# Patient Record
Sex: MALE | Race: WHITE | NOT HISPANIC OR LATINO | Employment: FULL TIME | URBAN - METROPOLITAN AREA
[De-identification: names, ages, dates, MRNs, and addresses within clinical notes are randomized per-mention and may not be internally consistent; named-entity substitution may affect disease eponyms.]

---

## 2020-09-16 ENCOUNTER — OFFICE VISIT (OUTPATIENT)
Dept: GASTROENTEROLOGY | Facility: CLINIC | Age: 40
End: 2020-09-16
Payer: COMMERCIAL

## 2020-09-16 VITALS
SYSTOLIC BLOOD PRESSURE: 145 MMHG | TEMPERATURE: 99.9 F | HEART RATE: 75 BPM | BODY MASS INDEX: 24.77 KG/M2 | DIASTOLIC BLOOD PRESSURE: 107 MMHG | WEIGHT: 163.4 LBS | HEIGHT: 68 IN

## 2020-09-16 DIAGNOSIS — K92.1 HEMATOCHEZIA: Primary | ICD-10-CM

## 2020-09-16 DIAGNOSIS — Z00.00 PREVENTATIVE HEALTH CARE: ICD-10-CM

## 2020-09-16 PROCEDURE — 99204 OFFICE O/P NEW MOD 45 MIN: CPT | Performed by: INTERNAL MEDICINE

## 2020-09-16 RX ORDER — MULTIVITAMIN
1 TABLET ORAL DAILY
COMMUNITY

## 2020-09-16 NOTE — PATIENT INSTRUCTIONS
Colonoscopy scheduled for 10/16/20 with Dr Vance [facility]  Supr instructions given by Kimberley CALDERON in the office

## 2020-09-16 NOTE — PROGRESS NOTES
[facility] - Outpatient Consultation  Roseanne Moreau 36 y o  male MRN: [id number]  Encounter: [id number]          ASSESSMENT AND PLAN:      51-year-old presents for evaluation of hematochezia  1  Hematochezia  Chronic symptoms  Most likely outlet bleeding  Could be due to hemorrhoids, rectal ulcers, IBD, tumors  Will check CBC and CMP  Will get colonoscopy  Will add EGD if hemoglobin significantly decreased below normal     2  Preventative  Healthcare  Check hepatitis C antibody    Aniceto Beck MD  Gastroenterology Fellow  [facility]  Date: September 16, 2020    ______________________________________________________________________    HPI:   51-year-old presents for evaluation of hematochezia  Patient says that he has been having bright red blood with stools for the last 1 year  The color of the stool is brown  The blood comes usually on the 2nd bowel movement of the day and then he has drops of blood in his underwear afterwards as well  He denies any constipation  He has used hemorrhoidal cream but that has not helped  No melena or maroon-colored stool  No other GI symptoms  No weight loss  He is current smoker and takes few beers daily  Aunt had colon cancer  No labs or abdominal imaging in the charts to review today  REVIEW OF SYSTEMS:    CONSTITUTIONAL: Denies any fever, chills, rigors, and weight loss  HEENT: No earache or tinnitus  Denies hearing loss or visual disturbances  CARDIOVASCULAR: No chest pain or palpitations  RESPIRATORY: Denies any cough, hemoptysis, shortness of breath or dyspnea on exertion  GASTROINTESTINAL: As noted in the History of Present Illness  GENITOURINARY: No problems with urination  Denies any hematuria or dysuria  NEUROLOGIC: No dizziness or vertigo, denies headaches  MUSCULOSKELETAL: Denies any muscle or joint pain  SKIN: Denies skin rashes or itching     ENDOCRINE: Denies excessive thirst  Denies intolerance to heat or cold  PSYCHOSOCIAL: Denies depression or anxiety  Denies any recent memory loss  Historical Information   History reviewed  No pertinent past medical history  History reviewed  No pertinent surgical history  Social History   Social History     Substance and Sexual Activity   Alcohol Use Yes    Comment: OCCASIONALLY     Social History     Substance and Sexual Activity   Drug Use Never     Social History     Tobacco Use   Smoking Status Current Some Day Smoker   Smokeless Tobacco Never Used     History reviewed  No pertinent family history  Meds/Allergies       Current Outpatient Medications:     Multiple Vitamin (multivitamin) tablet    No Known Allergies        Objective     Blood pressure (!) 145/107, pulse 75, temperature 99 9 °F (37 7 °C), temperature source Tympanic, height 5' 8" (1 727 m), weight 74 1 kg (163 lb 6 4 oz)  Body mass index is 24 84 kg/m²  PHYSICAL EXAM:      General Appearance:   Alert, cooperative, no distress   HEENT:   Normocephalic, atraumatic, anicteric      Neck:  Supple, symmetrical, trachea midline   Lungs:   Clear to auscultation bilaterally; no rales, rhonchi or wheezing; respirations unlabored    Heart[de-identified]   Regular rate and rhythm; no murmur, rub, or gallop  Abdomen:   Soft, non-tender, non-distended; normal bowel sounds; no masses, no organomegaly    Genitalia:   Deferred    Rectal:   Deferred    Extremities:  No cyanosis, clubbing or edema    Pulses:  2+ and symmetric    Skin:  No jaundice, rashes, or lesions    Lymph nodes:  No palpable cervical lymphadenopathy        Lab Results:   No visits with results within 1 Day(s) from this visit  Latest known visit with results is:   No results found for any previous visit  Radiology Results:   No results found

## 2020-09-25 ENCOUNTER — OFFICE VISIT (OUTPATIENT)
Dept: UROLOGY | Facility: MEDICAL CENTER | Age: 40
End: 2020-09-25
Payer: COMMERCIAL

## 2020-09-25 VITALS
DIASTOLIC BLOOD PRESSURE: 100 MMHG | TEMPERATURE: 96.9 F | BODY MASS INDEX: 24.71 KG/M2 | SYSTOLIC BLOOD PRESSURE: 142 MMHG | HEIGHT: 68 IN | WEIGHT: 163 LBS

## 2020-09-25 DIAGNOSIS — Z30.09 STERILIZATION CONSULT: Primary | ICD-10-CM

## 2020-09-25 PROCEDURE — 99203 OFFICE O/P NEW LOW 30 MIN: CPT | Performed by: UROLOGY

## 2020-09-25 NOTE — PATIENT INSTRUCTIONS
Vasectomy   WHAT YOU NEED TO KNOW:   A vasectomy is a procedure to make you sterile  It is a permanent form of birth control  The vas deferens (sperm tubes) are cut so that the semen does not contain sperm  DISCHARGE INSTRUCTIONS:   Medicines: You may need any of the following:  · NSAIDs  help decrease swelling, pain, and fever  This medicine can be bought with or without a doctor's order  This medicine can cause stomach bleeding or kidney problems in certain people  If you take blood thinner medicine, always ask your healthcare provider if NSAIDs are safe for you  Always read the medicine label and follow the directions on it before using this medicine  · Take your medicine as directed  Contact your healthcare provider if you think your medicine is not helping or if you have side effects  Tell him if you are allergic to any medicine  Keep a list of the medicines, vitamins, and herbs you take  Include the amounts, and when and why you take them  Bring the list or the pill bottles to follow-up visits  Carry your medicine list with you in case of an emergency  Decrease pain and swelling:   · Lie on your back  as much as possible the day of your procedure  Place a cushion such as a washcloth or small towel under your scrotum to elevate it  · Apply ice  on your scrotum for 15 to 20 minutes every hour for 2 days  Use an ice pack, or put crushed ice in a plastic bag  Cover it with a towel  Ice helps prevent tissue damage and decreases swelling and pain  · Wear an athletic supporter for at least 2 days  This will decrease pain and swelling, and protect your wound  Wound care:  Care for your wound as directed  Carefully wash the wound with soap and water  Dry the area and put on new, clean bandages as directed  Change your bandages when they get wet or dirty  Activity:  You may walk and drive normally the day after your procedure   Do not play sports, do yard work, or lift anything heavy until your healthcare provider says it is okay  You may need to wait up to a week before you have sex  Follow up with your healthcare provider or surgeon in 12 weeks: You will need to return to have your semen tested for sperm  Write down your questions so you remember to ask them during your visits  Contact your healthcare provider or surgeon if:   · You have a fever  · Your wound is red, swollen, or draining pus  · You feel pain or burning when you urinate  · You have worsening pain in your scrotum, even after you take medicine  · You have questions or concerns about your condition or care  Seek care immediately or call 911 if:   · Blood soaks through your bandage  · Your stitches come apart  · You see blood in your urine or semen  © 2017 [employer] Information is for End User's use only and may not be sold, redistributed or otherwise used for commercial purposes  All illustrations and images included in CareNotes® are the copyrighted property of A [employer] M [employer] Inc  or Eliel [employer]  The above information is an  only  It is not intended as medical advice for individual conditions or treatments  Talk to your doctor, nurse or pharmacist before following any medical regimen to see if it is safe and effective for you

## 2020-09-25 NOTE — PROGRESS NOTES
Chief complaint: Elective sterilization  History of present illness this is a 70-year-old male who presents for elective sterilization in the form of vasectomy being made aware the possibility of bleeding infection failure of vasectomy to provide sterilization recurrence sterilization or chronic pain syndrome  The procedure was explained to the patient in detail and he agreed to proceed with bilateral vasectomy  Patient has 4 children  He is also aware that this may not be reversible    Allergies medications past medical and surgical history family history reviewed  System review negative  Physical examination   well-nourished well-developed male appearing stated age in no apparent distress   HEENT normocephalic atraumatic   Pulmonary breathing unlabored   abdomen soft   phallus normal circumcised without cutaneous lesions  Meatus patent normally placed  Scrotum normal without cutaneous lesions  Testes adnexa palpably normal without masses or induration  Vas deferens palpable easily bilaterally      Extremities full range of motion without cyanosis clubbing or edema     impression   elective sterilization       plan     bilateral elective vasectomy

## 2020-10-05 ENCOUNTER — OFFICE VISIT (OUTPATIENT)
Dept: FAMILY MEDICINE CLINIC | Facility: CLINIC | Age: 40
End: 2020-10-05
Payer: COMMERCIAL

## 2020-10-05 VITALS
HEIGHT: 68 IN | SYSTOLIC BLOOD PRESSURE: 150 MMHG | DIASTOLIC BLOOD PRESSURE: 104 MMHG | OXYGEN SATURATION: 99 % | WEIGHT: 167 LBS | BODY MASS INDEX: 25.31 KG/M2 | HEART RATE: 76 BPM | TEMPERATURE: 98 F

## 2020-10-05 DIAGNOSIS — I10 UNCONTROLLED HYPERTENSION: ICD-10-CM

## 2020-10-05 DIAGNOSIS — Z13.1 SCREENING FOR DIABETES MELLITUS: ICD-10-CM

## 2020-10-05 DIAGNOSIS — Z13.29 THYROID DISORDER SCREENING: ICD-10-CM

## 2020-10-05 DIAGNOSIS — Z76.89 ENCOUNTER TO ESTABLISH CARE WITH NEW DOCTOR: Primary | ICD-10-CM

## 2020-10-05 DIAGNOSIS — Z13.220 LIPID SCREENING: ICD-10-CM

## 2020-10-05 DIAGNOSIS — Z13.89 SCREENING FOR GENITOURINARY CONDITION: ICD-10-CM

## 2020-10-05 DIAGNOSIS — K62.5 BLEEDING PER RECTUM: ICD-10-CM

## 2020-10-05 DIAGNOSIS — Z13.21 ENCOUNTER FOR VITAMIN DEFICIENCY SCREENING: ICD-10-CM

## 2020-10-05 DIAGNOSIS — Z72.0 TOBACCO USE: ICD-10-CM

## 2020-10-05 PROCEDURE — 99204 OFFICE O/P NEW MOD 45 MIN: CPT | Performed by: INTERNAL MEDICINE

## 2020-10-05 PROCEDURE — 3725F SCREEN DEPRESSION PERFORMED: CPT | Performed by: INTERNAL MEDICINE

## 2020-10-05 PROCEDURE — 3080F DIAST BP >= 90 MM HG: CPT | Performed by: INTERNAL MEDICINE

## 2020-10-05 RX ORDER — AMLODIPINE BESYLATE 2.5 MG/1
2.5 TABLET ORAL DAILY
Qty: 30 TABLET | Refills: 5 | Status: SHIPPED | OUTPATIENT
Start: 2020-10-05 | End: 2020-11-02 | Stop reason: SDUPTHER

## 2020-10-13 DIAGNOSIS — Z00.00 PREVENTATIVE HEALTH CARE: ICD-10-CM

## 2020-10-13 DIAGNOSIS — K92.1 HEMATOCHEZIA: ICD-10-CM

## 2020-10-14 ENCOUNTER — TELEPHONE (OUTPATIENT)
Dept: GASTROENTEROLOGY | Facility: CLINIC | Age: 40
End: 2020-10-14

## 2020-10-14 RX ORDER — POLYETHYLENE GLYCOL 3350 17 G/17G
POWDER, FOR SOLUTION ORAL
Qty: 238 G | Refills: 0 | Status: SHIPPED | OUTPATIENT
Start: 2020-10-14

## 2020-10-14 RX ORDER — SULFACETAMIDE SOD,MONOHYDRATE 100 %
POWDER (GRAM) MISCELLANEOUS
Qty: 2 G | Refills: 0 | OUTPATIENT
Start: 2020-10-14

## 2020-10-16 ENCOUNTER — ANESTHESIA EVENT (OUTPATIENT)
Dept: GASTROENTEROLOGY | Facility: HOSPITAL | Age: 40
End: 2020-10-16

## 2020-10-16 ENCOUNTER — HOSPITAL ENCOUNTER (OUTPATIENT)
Dept: GASTROENTEROLOGY | Facility: HOSPITAL | Age: 40
Setting detail: OUTPATIENT SURGERY
Discharge: HOME | End: 2020-10-16
Attending: INTERNAL MEDICINE | Admitting: INTERNAL MEDICINE
Payer: COMMERCIAL

## 2020-10-16 ENCOUNTER — ANESTHESIA (OUTPATIENT)
Dept: GASTROENTEROLOGY | Facility: HOSPITAL | Age: 40
End: 2020-10-16

## 2020-10-16 VITALS
WEIGHT: 167 LBS | BODY MASS INDEX: 25.31 KG/M2 | HEIGHT: 68 IN | OXYGEN SATURATION: 99 % | RESPIRATION RATE: 16 BRPM | SYSTOLIC BLOOD PRESSURE: 143 MMHG | DIASTOLIC BLOOD PRESSURE: 96 MMHG | HEART RATE: 77 BPM | TEMPERATURE: 98.2 F

## 2020-10-16 VITALS — HEART RATE: 79 BPM

## 2020-10-16 DIAGNOSIS — K92.1 HEMATOCHEZIA: ICD-10-CM

## 2020-10-16 PROCEDURE — 45378 DIAGNOSTIC COLONOSCOPY: CPT | Performed by: INTERNAL MEDICINE

## 2020-10-16 RX ORDER — PROPOFOL 10 MG/ML
INJECTION, EMULSION INTRAVENOUS CONTINUOUS PRN
Status: DISCONTINUED | OUTPATIENT
Start: 2020-10-16 | End: 2020-10-16

## 2020-10-16 RX ORDER — SODIUM CHLORIDE, SODIUM LACTATE, POTASSIUM CHLORIDE, CALCIUM CHLORIDE 600; 310; 30; 20 MG/100ML; MG/100ML; MG/100ML; MG/100ML
50 INJECTION, SOLUTION INTRAVENOUS CONTINUOUS
Status: CANCELLED | OUTPATIENT
Start: 2020-10-16

## 2020-10-16 RX ORDER — PROPOFOL 10 MG/ML
INJECTION, EMULSION INTRAVENOUS AS NEEDED
Status: DISCONTINUED | OUTPATIENT
Start: 2020-10-16 | End: 2020-10-16

## 2020-10-16 RX ORDER — SODIUM CHLORIDE 9 MG/ML
INJECTION, SOLUTION INTRAVENOUS CONTINUOUS PRN
Status: DISCONTINUED | OUTPATIENT
Start: 2020-10-16 | End: 2020-10-16

## 2020-10-16 RX ORDER — GLYCOPYRROLATE 0.2 MG/ML
INJECTION INTRAMUSCULAR; INTRAVENOUS AS NEEDED
Status: DISCONTINUED | OUTPATIENT
Start: 2020-10-16 | End: 2020-10-16

## 2020-10-16 RX ADMIN — GLYCOPYRROLATE 0.1 MG: 0.2 INJECTION, SOLUTION INTRAMUSCULAR; INTRAVENOUS at 13:56

## 2020-10-16 RX ADMIN — PROPOFOL 50 MG: 10 INJECTION, EMULSION INTRAVENOUS at 14:02

## 2020-10-16 RX ADMIN — PROPOFOL 120 MG: 10 INJECTION, EMULSION INTRAVENOUS at 13:58

## 2020-10-16 RX ADMIN — LIDOCAINE HYDROCHLORIDE 50 MG: 20 INJECTION, SOLUTION INTRAVENOUS at 13:56

## 2020-10-16 RX ADMIN — PROPOFOL 80 MG: 10 INJECTION, EMULSION INTRAVENOUS at 14:00

## 2020-10-16 RX ADMIN — LIDOCAINE HYDROCHLORIDE 50 MG: 20 INJECTION, SOLUTION INTRAVENOUS at 13:58

## 2020-10-16 RX ADMIN — PROPOFOL 130 MCG/KG/MIN: 10 INJECTION, EMULSION INTRAVENOUS at 14:03

## 2020-10-16 RX ADMIN — SODIUM CHLORIDE: 9 INJECTION, SOLUTION INTRAVENOUS at 13:53

## 2020-11-02 ENCOUNTER — OFFICE VISIT (OUTPATIENT)
Dept: FAMILY MEDICINE CLINIC | Facility: CLINIC | Age: 40
End: 2020-11-02
Payer: COMMERCIAL

## 2020-11-02 VITALS
DIASTOLIC BLOOD PRESSURE: 120 MMHG | OXYGEN SATURATION: 99 % | BODY MASS INDEX: 26.07 KG/M2 | HEART RATE: 88 BPM | HEIGHT: 68 IN | TEMPERATURE: 99 F | SYSTOLIC BLOOD PRESSURE: 178 MMHG | WEIGHT: 172 LBS

## 2020-11-02 DIAGNOSIS — I10 UNCONTROLLED HYPERTENSION: Primary | ICD-10-CM

## 2020-11-02 DIAGNOSIS — Z72.0 TOBACCO USE: ICD-10-CM

## 2020-11-02 PROCEDURE — 3008F BODY MASS INDEX DOCD: CPT | Performed by: INTERNAL MEDICINE

## 2020-11-02 PROCEDURE — 99213 OFFICE O/P EST LOW 20 MIN: CPT | Performed by: INTERNAL MEDICINE

## 2020-11-02 RX ORDER — AMLODIPINE BESYLATE 10 MG/1
10 TABLET ORAL DAILY
Qty: 30 TABLET | Refills: 1 | Status: SHIPPED | OUTPATIENT
Start: 2020-11-02 | End: 2020-11-03 | Stop reason: SDUPTHER

## 2020-11-02 RX ORDER — LISINOPRIL 10 MG/1
10 TABLET ORAL DAILY
Qty: 30 TABLET | Refills: 1 | Status: SHIPPED | OUTPATIENT
Start: 2020-11-02 | End: 2020-11-03 | Stop reason: SDUPTHER

## 2020-11-03 DIAGNOSIS — I10 UNCONTROLLED HYPERTENSION: ICD-10-CM

## 2020-11-03 RX ORDER — AMLODIPINE BESYLATE 10 MG/1
10 TABLET ORAL DAILY
Qty: 30 TABLET | Refills: 1 | Status: SHIPPED | OUTPATIENT
Start: 2020-11-03 | End: 2021-01-05

## 2020-11-03 RX ORDER — LISINOPRIL 10 MG/1
10 TABLET ORAL DAILY
Qty: 30 TABLET | Refills: 1 | Status: SHIPPED | OUTPATIENT
Start: 2020-11-03 | End: 2021-01-05

## 2020-11-07 ENCOUNTER — LAB (OUTPATIENT)
Dept: LAB | Facility: MEDICAL CENTER | Age: 40
End: 2020-11-07
Payer: COMMERCIAL

## 2020-11-07 LAB
25(OH)D3 SERPL-MCNC: 18.2 NG/ML (ref 30–100)
ALBUMIN SERPL BCP-MCNC: 4.1 G/DL (ref 3.5–5)
ALP SERPL-CCNC: 63 U/L (ref 46–116)
ALT SERPL W P-5'-P-CCNC: 58 U/L (ref 12–78)
ANION GAP SERPL CALCULATED.3IONS-SCNC: 6 MMOL/L (ref 4–13)
AST SERPL W P-5'-P-CCNC: 34 U/L (ref 5–45)
BACTERIA UR QL AUTO: NORMAL /HPF
BASOPHILS # BLD AUTO: 0.03 THOUSANDS/ΜL (ref 0–0.1)
BASOPHILS NFR BLD AUTO: 1 % (ref 0–1)
BILIRUB SERPL-MCNC: 0.66 MG/DL (ref 0.2–1)
BILIRUB UR QL STRIP: NEGATIVE
BUN SERPL-MCNC: 18 MG/DL (ref 5–25)
CALCIUM SERPL-MCNC: 9.4 MG/DL (ref 8.3–10.1)
CHLORIDE SERPL-SCNC: 107 MMOL/L (ref 100–108)
CHOLEST SERPL-MCNC: 228 MG/DL (ref 50–200)
CLARITY UR: CLEAR
CO2 SERPL-SCNC: 25 MMOL/L (ref 21–32)
COLOR UR: YELLOW
CREAT SERPL-MCNC: 1.09 MG/DL (ref 0.6–1.3)
EOSINOPHIL # BLD AUTO: 0.19 THOUSAND/ΜL (ref 0–0.61)
EOSINOPHIL NFR BLD AUTO: 4 % (ref 0–6)
ERYTHROCYTE [DISTWIDTH] IN BLOOD BY AUTOMATED COUNT: 12.9 % (ref 11.6–15.1)
GFR SERPL CREATININE-BSD FRML MDRD: 84 ML/MIN/1.73SQ M
GLUCOSE P FAST SERPL-MCNC: 98 MG/DL (ref 65–99)
GLUCOSE UR STRIP-MCNC: NEGATIVE MG/DL
HCT VFR BLD AUTO: 42.7 % (ref 36.5–49.3)
HDLC SERPL-MCNC: 66 MG/DL
HGB BLD-MCNC: 14.3 G/DL (ref 12–17)
HGB UR QL STRIP.AUTO: NEGATIVE
HYALINE CASTS #/AREA URNS LPF: NORMAL /LPF
IMM GRANULOCYTES # BLD AUTO: 0.03 THOUSAND/UL (ref 0–0.2)
IMM GRANULOCYTES NFR BLD AUTO: 1 % (ref 0–2)
KETONES UR STRIP-MCNC: NEGATIVE MG/DL
LDLC SERPL CALC-MCNC: 103 MG/DL (ref 0–100)
LEUKOCYTE ESTERASE UR QL STRIP: NEGATIVE
LYMPHOCYTES # BLD AUTO: 1.37 THOUSANDS/ΜL (ref 0.6–4.47)
LYMPHOCYTES NFR BLD AUTO: 29 % (ref 14–44)
MCH RBC QN AUTO: 32.2 PG (ref 26.8–34.3)
MCHC RBC AUTO-ENTMCNC: 33.5 G/DL (ref 31.4–37.4)
MCV RBC AUTO: 96 FL (ref 82–98)
MONOCYTES # BLD AUTO: 0.42 THOUSAND/ΜL (ref 0.17–1.22)
MONOCYTES NFR BLD AUTO: 9 % (ref 4–12)
NEUTROPHILS # BLD AUTO: 2.73 THOUSANDS/ΜL (ref 1.85–7.62)
NEUTS SEG NFR BLD AUTO: 56 % (ref 43–75)
NITRITE UR QL STRIP: NEGATIVE
NON-SQ EPI CELLS URNS QL MICRO: NORMAL /HPF
NONHDLC SERPL-MCNC: 162 MG/DL
NRBC BLD AUTO-RTO: 0 /100 WBCS
PH UR STRIP.AUTO: 5.5 [PH]
PLATELET # BLD AUTO: 255 THOUSANDS/UL (ref 149–390)
PMV BLD AUTO: 9 FL (ref 8.9–12.7)
POTASSIUM SERPL-SCNC: 4.6 MMOL/L (ref 3.5–5.3)
PROT SERPL-MCNC: 7.4 G/DL (ref 6.4–8.2)
PROT UR STRIP-MCNC: NEGATIVE MG/DL
RBC # BLD AUTO: 4.44 MILLION/UL (ref 3.88–5.62)
RBC #/AREA URNS AUTO: NORMAL /HPF
SODIUM SERPL-SCNC: 138 MMOL/L (ref 136–145)
SP GR UR STRIP.AUTO: 1.02 (ref 1–1.03)
TRIGL SERPL-MCNC: 293 MG/DL
TSH SERPL DL<=0.05 MIU/L-ACNC: 1.72 UIU/ML (ref 0.36–3.74)
UROBILINOGEN UR QL STRIP.AUTO: 0.2 E.U./DL
WBC # BLD AUTO: 4.77 THOUSAND/UL (ref 4.31–10.16)
WBC #/AREA URNS AUTO: NORMAL /HPF

## 2020-11-07 PROCEDURE — 81001 URINALYSIS AUTO W/SCOPE: CPT | Performed by: INTERNAL MEDICINE

## 2020-11-07 PROCEDURE — 84244 ASSAY OF RENIN: CPT | Performed by: INTERNAL MEDICINE

## 2020-11-07 PROCEDURE — 83835 ASSAY OF METANEPHRINES: CPT | Performed by: INTERNAL MEDICINE

## 2020-11-07 PROCEDURE — 84443 ASSAY THYROID STIM HORMONE: CPT | Performed by: INTERNAL MEDICINE

## 2020-11-07 PROCEDURE — 36415 COLL VENOUS BLD VENIPUNCTURE: CPT | Performed by: INTERNAL MEDICINE

## 2020-11-07 PROCEDURE — 80053 COMPREHEN METABOLIC PANEL: CPT | Performed by: INTERNAL MEDICINE

## 2020-11-07 PROCEDURE — 85025 COMPLETE CBC W/AUTO DIFF WBC: CPT | Performed by: INTERNAL MEDICINE

## 2020-11-07 PROCEDURE — 82306 VITAMIN D 25 HYDROXY: CPT | Performed by: INTERNAL MEDICINE

## 2020-11-07 PROCEDURE — 80061 LIPID PANEL: CPT | Performed by: INTERNAL MEDICINE

## 2020-11-07 PROCEDURE — 82088 ASSAY OF ALDOSTERONE: CPT | Performed by: INTERNAL MEDICINE

## 2020-11-10 ENCOUNTER — TELEPHONE (OUTPATIENT)
Dept: FAMILY MEDICINE CLINIC | Facility: CLINIC | Age: 40
End: 2020-11-10

## 2020-11-17 ENCOUNTER — OFFICE VISIT (OUTPATIENT)
Dept: FAMILY MEDICINE CLINIC | Facility: CLINIC | Age: 40
End: 2020-11-17
Payer: COMMERCIAL

## 2020-11-17 VITALS — TEMPERATURE: 99.3 F | SYSTOLIC BLOOD PRESSURE: 122 MMHG | DIASTOLIC BLOOD PRESSURE: 82 MMHG

## 2020-11-17 DIAGNOSIS — Z72.0 TOBACCO USE: ICD-10-CM

## 2020-11-17 DIAGNOSIS — I10 HYPERTENSION, UNSPECIFIED TYPE: Primary | ICD-10-CM

## 2020-11-17 LAB
METANEPH FREE SERPL-MCNC: 45.2 PG/ML (ref 0–88)
NORMETANEPHRINE SERPL-MCNC: 59.3 PG/ML (ref 0–125.8)

## 2020-11-17 PROCEDURE — 3074F SYST BP LT 130 MM HG: CPT | Performed by: INTERNAL MEDICINE

## 2020-11-17 PROCEDURE — 99213 OFFICE O/P EST LOW 20 MIN: CPT | Performed by: INTERNAL MEDICINE

## 2020-11-17 PROCEDURE — 3079F DIAST BP 80-89 MM HG: CPT | Performed by: INTERNAL MEDICINE

## 2020-11-24 DIAGNOSIS — E55.9 VITAMIN D DEFICIENCY: Primary | ICD-10-CM

## 2020-11-24 LAB — ALDOST SERPL-MCNC: 4.8 NG/DL (ref 0–30)

## 2020-11-24 RX ORDER — ERGOCALCIFEROL 1.25 MG/1
50000 CAPSULE ORAL WEEKLY
Qty: 8 CAPSULE | Refills: 0 | Status: SHIPPED | OUTPATIENT
Start: 2020-11-24 | End: 2021-03-01

## 2021-01-05 DIAGNOSIS — I10 UNCONTROLLED HYPERTENSION: ICD-10-CM

## 2021-01-05 RX ORDER — AMLODIPINE BESYLATE 10 MG/1
TABLET ORAL
Qty: 30 TABLET | Refills: 1 | Status: SHIPPED | OUTPATIENT
Start: 2021-01-05 | End: 2021-03-25

## 2021-01-05 RX ORDER — LISINOPRIL 10 MG/1
TABLET ORAL
Qty: 30 TABLET | Refills: 1 | Status: SHIPPED | OUTPATIENT
Start: 2021-01-05 | End: 2021-02-15 | Stop reason: SINTOL

## 2021-02-03 ENCOUNTER — TELEPHONE (OUTPATIENT)
Dept: UROLOGY | Facility: MEDICAL CENTER | Age: 41
End: 2021-02-03

## 2021-02-03 NOTE — TELEPHONE ENCOUNTER
Called patient - he is told that we can do his vasectomy with Dr Keyona Hernandez on a Friday  Patient will have wife call back  He can either come to [facility] or the [facility]

## 2021-02-03 NOTE — TELEPHONE ENCOUNTER
Pt by Dr Freeman,pt's wife called regarding 2/4 vas pt is unable to take 2 days off from work she's asking if any physician could do a Fri  I informed her I would forward request if so appointment needs to be rescheduled

## 2021-03-01 DIAGNOSIS — E55.9 VITAMIN D DEFICIENCY: ICD-10-CM

## 2021-03-01 RX ORDER — ERGOCALCIFEROL 1.25 MG/1
CAPSULE ORAL
Qty: 4 CAPSULE | Refills: 1 | Status: SHIPPED | OUTPATIENT
Start: 2021-03-01

## 2021-03-17 LAB — RENIN PLAS-CCNC: 4.8 NG/ML/HR (ref 0.17–5.38)

## 2021-03-25 DIAGNOSIS — I10 UNCONTROLLED HYPERTENSION: ICD-10-CM

## 2021-03-25 RX ORDER — AMLODIPINE BESYLATE 10 MG/1
TABLET ORAL
Qty: 90 TABLET | Refills: 2 | Status: SHIPPED | OUTPATIENT
Start: 2021-03-25 | End: 2021-11-18 | Stop reason: SDUPTHER

## 2021-09-07 ENCOUNTER — TELEPHONE (OUTPATIENT)
Dept: FAMILY MEDICINE CLINIC | Facility: CLINIC | Age: 41
End: 2021-09-07

## 2021-09-08 PROCEDURE — U0003 INFECTIOUS AGENT DETECTION BY NUCLEIC ACID (DNA OR RNA); SEVERE ACUTE RESPIRATORY SYNDROME CORONAVIRUS 2 (SARS-COV-2) (CORONAVIRUS DISEASE [COVID-19]), AMPLIFIED PROBE TECHNIQUE, MAKING USE OF HIGH THROUGHPUT TECHNOLOGIES AS DESCRIBED BY CMS-2020-01-R: HCPCS | Performed by: INTERNAL MEDICINE

## 2021-09-08 PROCEDURE — U0005 INFEC AGEN DETEC AMPLI PROBE: HCPCS | Performed by: INTERNAL MEDICINE

## 2021-10-11 ENCOUNTER — OFFICE VISIT (OUTPATIENT)
Dept: URGENT CARE | Facility: MEDICAL CENTER | Age: 41
End: 2021-10-11
Payer: COMMERCIAL

## 2021-10-11 VITALS
WEIGHT: 172 LBS | HEART RATE: 108 BPM | DIASTOLIC BLOOD PRESSURE: 104 MMHG | OXYGEN SATURATION: 98 % | BODY MASS INDEX: 26.07 KG/M2 | SYSTOLIC BLOOD PRESSURE: 142 MMHG | HEIGHT: 68 IN | RESPIRATION RATE: 16 BRPM | TEMPERATURE: 98 F

## 2021-10-11 DIAGNOSIS — J01.90 ACUTE SINUSITIS, RECURRENCE NOT SPECIFIED, UNSPECIFIED LOCATION: Primary | ICD-10-CM

## 2021-10-11 DIAGNOSIS — J40 BRONCHITIS: ICD-10-CM

## 2021-10-11 PROCEDURE — 99213 OFFICE O/P EST LOW 20 MIN: CPT | Performed by: PHYSICIAN ASSISTANT

## 2021-10-11 RX ORDER — AZITHROMYCIN 250 MG/1
TABLET, FILM COATED ORAL
Qty: 6 TABLET | Refills: 0 | Status: SHIPPED | OUTPATIENT
Start: 2021-10-11 | End: 2021-10-15

## 2021-10-11 RX ORDER — BENZONATATE 100 MG/1
100 CAPSULE ORAL 3 TIMES DAILY PRN
Qty: 20 CAPSULE | Refills: 0 | Status: SHIPPED | OUTPATIENT
Start: 2021-10-11

## 2021-11-18 ENCOUNTER — TELEPHONE (OUTPATIENT)
Dept: FAMILY MEDICINE CLINIC | Facility: CLINIC | Age: 41
End: 2021-11-18

## 2022-03-14 ENCOUNTER — OFFICE VISIT (OUTPATIENT)
Dept: FAMILY MEDICINE CLINIC | Facility: CLINIC | Age: 42
End: 2022-03-14
Payer: COMMERCIAL

## 2022-03-14 VITALS
DIASTOLIC BLOOD PRESSURE: 100 MMHG | WEIGHT: 155.2 LBS | HEART RATE: 94 BPM | OXYGEN SATURATION: 96 % | BODY MASS INDEX: 23.52 KG/M2 | SYSTOLIC BLOOD PRESSURE: 146 MMHG | HEIGHT: 68 IN | TEMPERATURE: 99.5 F

## 2022-03-14 DIAGNOSIS — Z13.1 SCREENING FOR DIABETES MELLITUS: ICD-10-CM

## 2022-03-14 DIAGNOSIS — E55.9 VITAMIN D DEFICIENCY: ICD-10-CM

## 2022-03-14 DIAGNOSIS — E78.1 HYPERTRIGLYCERIDEMIA: ICD-10-CM

## 2022-03-14 DIAGNOSIS — R05.3 CHRONIC COUGH: ICD-10-CM

## 2022-03-14 DIAGNOSIS — Z00.00 ANNUAL PHYSICAL EXAM: ICD-10-CM

## 2022-03-14 DIAGNOSIS — Z13.29 THYROID DISORDER SCREENING: ICD-10-CM

## 2022-03-14 DIAGNOSIS — I10 UNCONTROLLED HYPERTENSION: Primary | ICD-10-CM

## 2022-03-14 DIAGNOSIS — Z72.0 TOBACCO USE: ICD-10-CM

## 2022-03-14 PROBLEM — K64.9 HEMORRHOIDS: Status: ACTIVE | Noted: 2022-03-14

## 2022-03-14 PROCEDURE — 3008F BODY MASS INDEX DOCD: CPT | Performed by: INTERNAL MEDICINE

## 2022-03-14 PROCEDURE — 4004F PT TOBACCO SCREEN RCVD TLK: CPT | Performed by: INTERNAL MEDICINE

## 2022-03-14 PROCEDURE — 99396 PREV VISIT EST AGE 40-64: CPT | Performed by: INTERNAL MEDICINE

## 2022-03-14 PROCEDURE — 3725F SCREEN DEPRESSION PERFORMED: CPT | Performed by: INTERNAL MEDICINE

## 2022-03-14 RX ORDER — ALBUTEROL SULFATE 90 UG/1
2 AEROSOL, METERED RESPIRATORY (INHALATION) EVERY 6 HOURS PRN
Qty: 18 G | Refills: 5 | Status: SHIPPED | OUTPATIENT
Start: 2022-03-14

## 2022-03-14 NOTE — PATIENT INSTRUCTIONS

## 2022-03-14 NOTE — PROGRESS NOTES
[address] [facility]IMARY [facility]ARE [facility]    NAME: Constantino Daniels  AGE: 39 y o  SEX: male  : 1980     DATE: 3/14/2022     Assessment and Plan:     Problem List Items Addressed This Visit     None      Visit Diagnoses     Uncontrolled hypertension    -  Primary    Relevant Orders    CBC and differential    Comprehensive metabolic panel    UA (URINE) with reflex to Scope    Annual physical exam        Tobacco use        Relevant Orders    XR chest pa & lateral    Vitamin D deficiency        Relevant Orders    Vitamin D Panel    Hypertriglyceridemia        Relevant Orders    Lipid panel    Thyroid disorder screening        Relevant Orders    TSH, 3rd generation    Screening for diabetes mellitus        Relevant Orders    Comprehensive metabolic panel    Chronic cough        Relevant Medications    albuterol (Ventolin HFA) 90 mcg/act inhaler    fluticasone-vilanterol (Breo Ellipta) 200-25 MCG/INH inhaler      Chest x-ray would be ordered because of chronicity of his symptoms  A CT scan is to be considered after x-rays reviewed  Inhalers will be called in  I will be seeing patient back in a month  He promises to go for blood work  I also pointed out his blood pressure is high  He reports he has not been taking his medication regular basis  Compliance encouraged  Immunizations and preventive care screenings were discussed with patient today  Appropriate education was printed on patient's after visit summary  Counseling:  · Tobacco cessation         No follow-ups on file  Chief Complaint:     Chief Complaint   Patient presents with    Annual Exam    Cough     whezzing      History of Present Illness:     Adult Annual Physical   Patient here for a comprehensive physical exam  The patient reports problems - Patient reports cough going on for several months now  He notes that it started after he had COVID infection    He is not vaccinated  The cough is mostly dry in nature  He does report nasal congestion  Denies any hemoptysis denies any fever chills weight loss  He has been smoking since he was 13years of age and has been able to cut back to couple of cigarettes a day  Did not like nicotine patch but does have nicotine gum  He works for [employer]       Diet and Physical Activity  · Diet/Nutrition: Recommend low-fat diet  · Exercise: walking  Depression Screening  PHQ-2/9 Depression Screening    Little interest or pleasure in doing things: 0 - not at all  Feeling down, depressed, or hopeless: 0 - not at all  PHQ-2 Score: 0  PHQ-2 Interpretation: Negative depression screen       General Health  · Sleep: sleeps well  · Hearing: normal - bilateral   · Vision: no vision problems  · Dental: Did not ask          Health  · Symptoms include: none     Review of Systems:     Review of Systems   Past Medical History:     Past Medical History:   Diagnosis Date    GERD (gastroesophageal reflux disease)     Hypertension     Rectal bleed       Past Surgical History:     Past Surgical History:   Procedure Laterality Date    EYE SURGERY        Family History:     Family History   Problem Relation Age of Onset    Hypertension Father     Colon cancer Paternal Aunt       Social History:     Social History     Socioeconomic History    Marital status:      Spouse name: None    Number of children: None    Years of education: None    Highest education level: None   Occupational History    None   Tobacco Use    Smoking status: Current Some Day Smoker     Packs/day: 1 00    Smokeless tobacco: Never Used   Vaping Use    Vaping Use: Never used   Substance and Sexual Activity    Alcohol use: Yes     Comment: OCCASIONALLY    Drug use: Never    Sexual activity: None   Other Topics Concern    None   Social History Narrative    None     Social Determinants of Health     Financial Resource Strain: Not on file   Food Insecurity: Not on file   Transportation Needs: Not on file   Physical Activity: Not on file   Stress: Not on file   Social Connections: Not on file   Intimate Partner Violence: Not on file   Housing Stability: Not on file      Current Medications:     Current Outpatient Medications   Medication Sig Dispense Refill    amLODIPine (NORVASC) 10 mg tablet Take 1 tablet (10 mg total) by mouth daily 30 tablet 3    benzonatate (TESSALON PERLES) 100 mg capsule Take 1 capsule (100 mg total) by mouth 3 (three) times a day as needed for cough 20 capsule 0    losartan (COZAAR) 25 mg tablet Take 1 tablet (25 mg total) by mouth daily 30 tablet 3    albuterol (Ventolin HFA) 90 mcg/act inhaler Inhale 2 puffs every 6 (six) hours as needed for wheezing 18 g 5    bisacodyl (DULCOLAX) 5 mg EC tablet Take as directed as per written office instructions (Patient not taking: Reported on 10/11/2021) 2 tablet 0    ergocalciferol (VITAMIN D2) 50,000 units TAKE 1 CAPSULE BY MOUTH ONE TIME PER WEEK (Patient not taking: Reported on 10/11/2021) 4 capsule 1    fluticasone-vilanterol (Breo Ellipta) 200-25 MCG/INH inhaler Inhale 1 puff daily Rinse mouth after use  60 blister 0    Multiple Vitamin (multivitamin) tablet Take 1 tablet by mouth daily (Patient not taking: Reported on 10/11/2021)      Na Sulfate-K Sulfate-Mg Sulf 17 5-3 13-1 6 GM/177ML SOLN Take 1 kit by mouth once for 1 dose Colonoscopy prep   2 Bottle 0    nicotine (NICODERM CQ) 14 mg/24hr TD 24 hr patch Place 1 patch on the skin every 24 hours Start after 21 mg patches are finished (Patient not taking: Reported on 10/11/2021) 28 patch 0    nicotine (NICODERM CQ) 21 mg/24 hr TD 24 hr patch Place 1 patch on the skin every 24 hours (Patient not taking: Reported on 10/11/2021) 28 patch 0    nicotine (NICODERM CQ) 7 mg/24hr TD 24 hr patch Place 1 patch on the skin every 24 hours Start after 14 mg patches are finished (Patient not taking: Reported on 10/11/2021) 28 patch 0    polyethylene glycol (GLYCOLAX) 17 GM/SCOOP powder Take as directed as per written office instructions (Patient not taking: Reported on 10/11/2021) 238 g 0     No current facility-administered medications for this visit  Allergies:     No Known Allergies   Physical Exam:     /100 (BP Location: Left arm, Patient Position: Sitting, Cuff Size: Standard)   Pulse 94   Temp 99 5 °F (37 5 °C) (Tympanic)   Ht 5' 8" (1 727 m)   Wt 70 4 kg (155 lb 3 2 oz)   SpO2 96%   BMI 23 60 kg/m²     Physical Exam  Constitutional:       Appearance: He is not ill-appearing  Comments: Sounds congested   Eyes:      Conjunctiva/sclera: Conjunctivae normal    Neck:      Vascular: No carotid bruit  Cardiovascular:      Rate and Rhythm: Normal rate and regular rhythm  Heart sounds: Normal heart sounds  No murmur heard  Pulmonary:      Effort: Pulmonary effort is normal  No respiratory distress  Breath sounds: Normal breath sounds  Abdominal:      General: Bowel sounds are normal  There is no distension  Tenderness: There is no abdominal tenderness  There is no right CVA tenderness, left CVA tenderness or guarding  Musculoskeletal:      Right lower leg: No edema  Left lower leg: No edema  Lymphadenopathy:      Cervical: No cervical adenopathy  Neurological:      Mental Status: He is alert and oriented to person, place, and time  Cranial Nerves: No cranial nerve deficit  Motor: No weakness        Gait: Gait normal    Psychiatric:         Mood and Affect: Mood normal          Behavior: Behavior normal           Royal Sly MD  [address]

## 2022-04-06 DIAGNOSIS — R05.3 CHRONIC COUGH: ICD-10-CM

## 2022-04-11 ENCOUNTER — RA CDI HCC (OUTPATIENT)
Dept: OTHER | Facility: HOSPITAL | Age: 42
End: 2022-04-11

## 2022-04-11 NOTE — PROGRESS NOTES
[facility]  coding opportunities       Chart reviewed, no opportunity found: CHART REVIEWED, NO OPPORTUNITY FOUND        Patients Insurance     Medicare Insurance: [employer] [facility] Advantage

## 2022-12-06 DIAGNOSIS — I10 UNCONTROLLED HYPERTENSION: ICD-10-CM

## 2022-12-06 RX ORDER — AMLODIPINE BESYLATE 10 MG/1
10 TABLET ORAL DAILY
Qty: 30 TABLET | Refills: 1 | Status: SHIPPED | OUTPATIENT
Start: 2022-12-06

## 2023-04-27 ENCOUNTER — APPOINTMENT (INPATIENT)
Dept: RADIOLOGY | Facility: HOSPITAL | Age: 43
End: 2023-04-27

## 2023-04-27 ENCOUNTER — HOSPITAL ENCOUNTER (INPATIENT)
Facility: HOSPITAL | Age: 43
LOS: 3 days | Discharge: HOME | End: 2023-04-30
Attending: EMERGENCY MEDICINE | Admitting: EMERGENCY MEDICINE

## 2023-04-27 DIAGNOSIS — F10.20 ALCOHOL USE DISORDER, SEVERE, DEPENDENCE (HCC): ICD-10-CM

## 2023-04-27 DIAGNOSIS — F10.10 ALCOHOL ABUSE: ICD-10-CM

## 2023-04-27 DIAGNOSIS — F10.920 ALCOHOLIC INTOXICATION WITHOUT COMPLICATION (HCC): ICD-10-CM

## 2023-04-27 DIAGNOSIS — R05.3 CHRONIC COUGH: ICD-10-CM

## 2023-04-27 DIAGNOSIS — J45.21 MILD INTERMITTENT ASTHMA WITH ACUTE EXACERBATION: ICD-10-CM

## 2023-04-27 DIAGNOSIS — F10.939 ALCOHOL WITHDRAWAL (HCC): Primary | ICD-10-CM

## 2023-04-27 PROBLEM — J45.20 MILD INTERMITTENT ASTHMA WITHOUT COMPLICATION: Status: ACTIVE | Noted: 2023-04-27

## 2023-04-27 PROBLEM — E83.42 HYPOMAGNESEMIA: Status: ACTIVE | Noted: 2023-04-27

## 2023-04-27 PROBLEM — R74.01 TRANSAMINITIS: Status: ACTIVE | Noted: 2023-04-27

## 2023-04-27 PROBLEM — I10 PRIMARY HYPERTENSION: Status: ACTIVE | Noted: 2023-04-27

## 2023-04-27 PROBLEM — F17.200 TOBACCO USE DISORDER: Status: ACTIVE | Noted: 2023-04-27

## 2023-04-27 LAB
ALBUMIN SERPL BCP-MCNC: 4.5 G/DL (ref 3.5–5)
ALP SERPL-CCNC: 48 U/L (ref 34–104)
ALT SERPL W P-5'-P-CCNC: 61 U/L (ref 7–52)
AMPHETAMINES SERPL QL SCN: NEGATIVE
ANION GAP SERPL CALCULATED.3IONS-SCNC: 15 MMOL/L (ref 4–13)
APAP SERPL-MCNC: <10 UG/ML (ref 10–20)
AST SERPL W P-5'-P-CCNC: 145 U/L (ref 13–39)
ATRIAL RATE: 92 BPM
BARBITURATES UR QL: NEGATIVE
BASOPHILS # BLD AUTO: 0.02 THOUSANDS/ΜL (ref 0–0.1)
BASOPHILS NFR BLD AUTO: 1 % (ref 0–1)
BENZODIAZ UR QL: NEGATIVE
BILIRUB SERPL-MCNC: 0.62 MG/DL (ref 0.2–1)
BUN SERPL-MCNC: 9 MG/DL (ref 5–25)
CALCIUM SERPL-MCNC: 8.9 MG/DL (ref 8.4–10.2)
CHLORIDE SERPL-SCNC: 100 MMOL/L (ref 96–108)
CO2 SERPL-SCNC: 22 MMOL/L (ref 21–32)
COCAINE UR QL: NEGATIVE
CREAT SERPL-MCNC: 0.8 MG/DL (ref 0.6–1.3)
EOSINOPHIL # BLD AUTO: 0.02 THOUSAND/ΜL (ref 0–0.61)
EOSINOPHIL NFR BLD AUTO: 1 % (ref 0–6)
ERYTHROCYTE [DISTWIDTH] IN BLOOD BY AUTOMATED COUNT: 12.8 % (ref 11.6–15.1)
ETHANOL SERPL-MCNC: 160 MG/DL
GFR SERPL CREATININE-BSD FRML MDRD: 110 ML/MIN/1.73SQ M
GLUCOSE SERPL-MCNC: 142 MG/DL (ref 65–140)
HCT VFR BLD AUTO: 40.8 % (ref 36.5–49.3)
HGB BLD-MCNC: 14.2 G/DL (ref 12–17)
IMM GRANULOCYTES # BLD AUTO: 0.02 THOUSAND/UL (ref 0–0.2)
IMM GRANULOCYTES NFR BLD AUTO: 1 % (ref 0–2)
LYMPHOCYTES # BLD AUTO: 1.09 THOUSANDS/ΜL (ref 0.6–4.47)
LYMPHOCYTES NFR BLD AUTO: 28 % (ref 14–44)
MAGNESIUM SERPL-MCNC: 1.6 MG/DL (ref 1.9–2.7)
MCH RBC QN AUTO: 34.5 PG (ref 26.8–34.3)
MCHC RBC AUTO-ENTMCNC: 34.8 G/DL (ref 31.4–37.4)
MCV RBC AUTO: 99 FL (ref 82–98)
METHADONE UR QL: NEGATIVE
MONOCYTES # BLD AUTO: 0.29 THOUSAND/ΜL (ref 0.17–1.22)
MONOCYTES NFR BLD AUTO: 8 % (ref 4–12)
NEUTROPHILS # BLD AUTO: 2.45 THOUSANDS/ΜL (ref 1.85–7.62)
NEUTS SEG NFR BLD AUTO: 61 % (ref 43–75)
NRBC BLD AUTO-RTO: 0 /100 WBCS
OPIATES UR QL SCN: NEGATIVE
OXYCODONE+OXYMORPHONE UR QL SCN: NEGATIVE
P AXIS: 58 DEGREES
PCP UR QL: NEGATIVE
PLATELET # BLD AUTO: 174 THOUSANDS/UL (ref 149–390)
PMV BLD AUTO: 8.7 FL (ref 8.9–12.7)
POTASSIUM SERPL-SCNC: 3.7 MMOL/L (ref 3.5–5.3)
PR INTERVAL: 154 MS
PROT SERPL-MCNC: 7 G/DL (ref 6.4–8.4)
QRS AXIS: -8 DEGREES
QRSD INTERVAL: 108 MS
QT INTERVAL: 364 MS
QTC INTERVAL: 450 MS
RBC # BLD AUTO: 4.12 MILLION/UL (ref 3.88–5.62)
SALICYLATES SERPL-MCNC: <5 MG/DL (ref 3–20)
SODIUM SERPL-SCNC: 137 MMOL/L (ref 135–147)
T WAVE AXIS: 7 DEGREES
THC UR QL: NEGATIVE
VENTRICULAR RATE: 92 BPM
WBC # BLD AUTO: 3.89 THOUSAND/UL (ref 4.31–10.16)

## 2023-04-27 PROCEDURE — HZ2ZZZZ DETOXIFICATION SERVICES FOR SUBSTANCE ABUSE TREATMENT: ICD-10-PCS | Performed by: EMERGENCY MEDICINE

## 2023-04-27 RX ORDER — FOLIC ACID 1 MG/1
1 TABLET ORAL DAILY
Status: DISCONTINUED | OUTPATIENT
Start: 2023-04-27 | End: 2023-04-30 | Stop reason: HOSPADM

## 2023-04-27 RX ORDER — MAGNESIUM SULFATE HEPTAHYDRATE 40 MG/ML
2 INJECTION, SOLUTION INTRAVENOUS ONCE
Status: COMPLETED | OUTPATIENT
Start: 2023-04-27 | End: 2023-04-28

## 2023-04-27 RX ORDER — GUAIFENESIN 600 MG/1
600 TABLET, EXTENDED RELEASE ORAL EVERY 12 HOURS SCHEDULED
Status: DISCONTINUED | OUTPATIENT
Start: 2023-04-27 | End: 2023-04-30 | Stop reason: HOSPADM

## 2023-04-27 RX ORDER — ALBUTEROL SULFATE 90 UG/1
2 AEROSOL, METERED RESPIRATORY (INHALATION) EVERY 4 HOURS PRN
Status: DISCONTINUED | OUTPATIENT
Start: 2023-04-27 | End: 2023-04-30 | Stop reason: HOSPADM

## 2023-04-27 RX ORDER — TRAZODONE HYDROCHLORIDE 50 MG/1
50 TABLET ORAL
Status: DISCONTINUED | OUTPATIENT
Start: 2023-04-27 | End: 2023-04-30 | Stop reason: HOSPADM

## 2023-04-27 RX ORDER — ACETAMINOPHEN 325 MG/1
650 TABLET ORAL EVERY 6 HOURS PRN
Status: DISCONTINUED | OUTPATIENT
Start: 2023-04-27 | End: 2023-04-30 | Stop reason: HOSPADM

## 2023-04-27 RX ORDER — LOSARTAN POTASSIUM 25 MG/1
25 TABLET ORAL DAILY
Status: DISCONTINUED | OUTPATIENT
Start: 2023-04-27 | End: 2023-04-30

## 2023-04-27 RX ORDER — ENOXAPARIN SODIUM 100 MG/ML
40 INJECTION SUBCUTANEOUS DAILY
Status: DISCONTINUED | OUTPATIENT
Start: 2023-04-27 | End: 2023-04-30 | Stop reason: HOSPADM

## 2023-04-27 RX ORDER — NICOTINE 21 MG/24HR
1 PATCH, TRANSDERMAL 24 HOURS TRANSDERMAL DAILY
Status: DISCONTINUED | OUTPATIENT
Start: 2023-04-27 | End: 2023-04-30 | Stop reason: HOSPADM

## 2023-04-27 RX ORDER — DEXTROSE AND SODIUM CHLORIDE 5; .9 G/100ML; G/100ML
100 INJECTION, SOLUTION INTRAVENOUS CONTINUOUS
Status: DISCONTINUED | OUTPATIENT
Start: 2023-04-27 | End: 2023-04-28

## 2023-04-27 RX ORDER — PHENOBARBITAL SODIUM 130 MG/ML
130 INJECTION INTRAMUSCULAR ONCE
Status: COMPLETED | OUTPATIENT
Start: 2023-04-27 | End: 2023-04-27

## 2023-04-27 RX ORDER — DIAZEPAM 5 MG/ML
2.5 INJECTION, SOLUTION INTRAMUSCULAR; INTRAVENOUS ONCE
Status: DISCONTINUED | OUTPATIENT
Start: 2023-04-27 | End: 2023-04-28

## 2023-04-27 RX ORDER — MAGNESIUM SULFATE 1 G/100ML
1 INJECTION INTRAVENOUS ONCE
Status: DISCONTINUED | OUTPATIENT
Start: 2023-04-27 | End: 2023-04-28

## 2023-04-27 RX ORDER — LANOLIN ALCOHOL/MO/W.PET/CERES
100 CREAM (GRAM) TOPICAL DAILY
Status: DISCONTINUED | OUTPATIENT
Start: 2023-04-27 | End: 2023-04-30 | Stop reason: HOSPADM

## 2023-04-27 RX ORDER — ONDANSETRON 2 MG/ML
4 INJECTION INTRAMUSCULAR; INTRAVENOUS EVERY 6 HOURS PRN
Status: DISCONTINUED | OUTPATIENT
Start: 2023-04-27 | End: 2023-04-30 | Stop reason: HOSPADM

## 2023-04-27 RX ORDER — AMLODIPINE BESYLATE 10 MG/1
10 TABLET ORAL DAILY
Status: DISCONTINUED | OUTPATIENT
Start: 2023-04-28 | End: 2023-04-30 | Stop reason: HOSPADM

## 2023-04-27 RX ORDER — DIAZEPAM 5 MG/ML
10 INJECTION, SOLUTION INTRAMUSCULAR; INTRAVENOUS ONCE
Status: COMPLETED | OUTPATIENT
Start: 2023-04-27 | End: 2023-04-27

## 2023-04-27 RX ORDER — LOSARTAN POTASSIUM 25 MG/1
25 TABLET ORAL DAILY
Status: DISCONTINUED | OUTPATIENT
Start: 2023-04-28 | End: 2023-04-27

## 2023-04-27 RX ADMIN — FOLIC ACID 1 MG: 5 INJECTION, SOLUTION INTRAMUSCULAR; INTRAVENOUS; SUBCUTANEOUS at 12:11

## 2023-04-27 RX ADMIN — PHENOBARBITAL SODIUM 130 MG: 130 INJECTION INTRAMUSCULAR; INTRAVENOUS at 17:54

## 2023-04-27 RX ADMIN — FOLIC ACID 1 MG: 1 TABLET ORAL at 14:34

## 2023-04-27 RX ADMIN — SODIUM CHLORIDE 1000 ML: 0.9 INJECTION, SOLUTION INTRAVENOUS at 12:10

## 2023-04-27 RX ADMIN — DEXTROSE AND SODIUM CHLORIDE 100 ML/HR: 5; .9 INJECTION, SOLUTION INTRAVENOUS at 23:53

## 2023-04-27 RX ADMIN — DIAZEPAM 10 MG: 10 INJECTION, SOLUTION INTRAMUSCULAR; INTRAVENOUS at 14:41

## 2023-04-27 RX ADMIN — PHENOBARBITAL SODIUM 130 MG: 130 INJECTION INTRAMUSCULAR; INTRAVENOUS at 20:31

## 2023-04-27 RX ADMIN — DEXTROSE AND SODIUM CHLORIDE 100 ML/HR: 5; .9 INJECTION, SOLUTION INTRAVENOUS at 14:35

## 2023-04-27 RX ADMIN — THIAMINE HCL TAB 100 MG 100 MG: 100 TAB at 14:34

## 2023-04-27 RX ADMIN — MAGNESIUM SULFATE HEPTAHYDRATE 2 G: 2 INJECTION, SOLUTION INTRAVENOUS at 14:40

## 2023-04-27 RX ADMIN — MULTIPLE VITAMINS W/ MINERALS TAB 1 TABLET: TAB ORAL at 14:34

## 2023-04-27 RX ADMIN — ENOXAPARIN SODIUM 40 MG: 40 INJECTION SUBCUTANEOUS at 14:32

## 2023-04-27 RX ADMIN — Medication 650 MG: at 14:55

## 2023-04-27 RX ADMIN — GUAIFENESIN 600 MG: 600 TABLET, EXTENDED RELEASE ORAL at 14:34

## 2023-04-27 RX ADMIN — LOSARTAN POTASSIUM 25 MG: 25 TABLET, FILM COATED ORAL at 17:01

## 2023-04-27 RX ADMIN — PHENOBARBITAL SODIUM 130 MG: 130 INJECTION INTRAMUSCULAR; INTRAVENOUS at 16:18

## 2023-04-27 RX ADMIN — THIAMINE HYDROCHLORIDE 400 MG: 100 INJECTION, SOLUTION INTRAMUSCULAR; INTRAVENOUS at 13:29

## 2023-04-27 RX ADMIN — NICOTINE 1 PATCH: 21 PATCH, EXTENDED RELEASE TRANSDERMAL at 14:33

## 2023-04-27 NOTE — ASSESSMENT & PLAN NOTE
· Home regimen of losartan 25mg QD, amlodipine 10mg QD  · Will continue home regimen   · Routine monitoring of VS

## 2023-04-27 NOTE — H&P
HISTORY & PHYSICAL EXAM  DEPARTMENT OF MEDICAL TOXICOLOGY  LEVEL 4 MEDICAL DETOX UNIT  Jackie Galvan 43 y o  male MRN: [id number]  Unit/Bed#: 5T DETOX 509-01 Encounter: [id number]      Reason for Admission/Principal Problem: Ethanol withdrawal, Ethanol use disorder  Admitting Provider: BECCA Peralta  Attending Provider: No att  providers found   4/27/2023 11:30 AM      * Alcohol withdrawal syndrome with complication [facility])  Assessment & Plan  Patient with a history of chronic daily alcohol use  Last drink 4/26 PM  Serum alcohol 160 in the ED  Initiate SEWS protocol for medical management of alcohol withdrawal  Current alcohol withdrawal signs/symptoms include anxiety, tremor, hypertension   Continue clinical monitoring of symptom progression   Continuous pulse ox and telemetry monitoring    Tobacco use disorder  Assessment & Plan  · Daily tobacco use   · Offered NRT  · Encourage cessation     Mild intermittent asthma without complication  Assessment & Plan  · History of mild intermittent asthma that he reports is usually exacerbated with smoking  · Currently endorses productive cough with clear sputum and wheezing     · On exam inspiratory/expiratory wheeze throughout left lung art   · Will place PRN albuterol inhaler and chest x ray   · Continue to monitor   · Encourage cessation     Primary hypertension  Assessment & Plan  · Home regimen of losartan 25mg QD, amlodipine 10mg QD  · Will continue home regimen   · Routine monitoring of VS    Hypomagnesemia  Assessment & Plan  Mag 1 6 on admission  Supplemented   Recheck Mag level in the AM and optimize electrolytes       Transaminitis  Assessment & Plan  Lab Results   Component Value Date    ALT 61 (H) 04/27/2023     (H) 04/27/2023    ALKPHOS 48 04/27/2023    TBILI 0 62 04/27/2023        · Elevated LFTs on admission as above  · Likely 2/2 chronic alcohol use/alcoholic liver disease  · Pt denies any abdominal pain   · Initiate IVFs  · Will obtain imaging if develops abdominal pain or LFTs worsen/do not improve  · Continue routine monitoring of CMP   · Encourage alcohol cessation      Alcohol use disorder, severe, dependence ([facility] [address] )  Assessment & Plan  Pt with a h/o chronic heavy alcohol use for without withdrawal related seizures   Drinks >10 shots of liquor, and couple of beers daily   Considering naltrexone prior to discharge  Withdrawal management as above  Initiate IVFs, daily thiamine/folic acid supplementation, and MVI  Consult case management for assistance with aftercare resources - pt interested in outpatient resources upon discharge          VTE Prophylaxis: Enoxaparin (Lovenox)  / sequential compression device   Code Status: level 1 full code       Anticipated Length of Stay:  Patient will be admitted on an Inpatient basis with an anticipated length of stay of  2  midnights  Justification for Hospital Stay: medically monitored alcohol withdrawal     For any questions or concerns, please Tiger Text the advanced practitioner in the role of [facility]-DETOX-AP On Call      This patient qualifies for Level IV medically managed intensive inpatient services under the criteria set by the American Society of Addiction Medicine, including dimensions 1-3  The patient is in withdrawal (or is intoxicated with high risk of withdrawal), with severe and unstable medical and/or psychiatric (dual diagnosis) problems, requiring requires 24-hour medical and nursing care and the full resources of a [address] patient to medical detox unit and continue supportive care and stabilization of acute ethanol withdrawal per medical toxicology/detox treatment pathway  Monitor ethanol withdrawal severity via the Severity of Ethanol Withdrawal Scale (SEWS) Q4 hours and then hourly if/when SEWS > 6  Treat withdrawal per pathway and reassess Q30-60 minutes             Mild SEWS Score 1-6  Administer medications* (IV or PO; PO preferred):   If initial SEWS score: diazepam 10mg PO/IV x 1 AND phenobarbital 65 mg PO/IV x 1   If repeat SEWS score 1-6: phenobarbital 65 mg PO/IV q1 hour x 5 doses maximum   Reassessment:    SEWS q1 hour after each dose until SEWS 0 x 2 hours   VS q1 hours (until SEWS 0, then q4 hours)   Notify provider for bedside evaluation if 5-dose maximum is reached, RASS of -3 to -5, or SEWS score escalates to moderate or severe  Moderate SEWS Score 7-12  Administer medications* (IV):   If initial SEWS score: diazepam 10mg IV x 1 AND phenobarbital 260 mg IV x 1   If repeat SEWS score 7-12 or score escalated from mild: phenobarbital 130 mg IV q30 minutes x 5 doses maximum   Reassessment:   SEWS q30 minutes after each dose until SEWS < 7 (then hourly until SEWS 0 x 2 hours)   VS q30 minutes until SEWS < 7 (then hourly until SEWS 0, then q4 hours)   Notify provider for bedside evaluation if 5-dose maximum is reached, RASS of -3 to -5, or SEWS score escalates to severe  Severe SEWS Score ? 13  Administer medications* (IV):   If initial SEWS score: Diazepam 10 mg IV x 1 AND phenobarbital 650 mg IV piggyback x 1 over 15-30 minutes   If repeat SEWS score ? 13 or score escalated from mild or moderate: phenobarbital 130 mg IV q30 minutes x 5 doses maximum   Reassessment:   SEWS q30 minutes after each dose until SEWS < 7 (then hourly until SEWS 0 x 2 hours)    VS q30 minutes until SEWS < 7 (then hourly until SEWS 0, then q4 hours)   Notify provider for bedside evaluation if 5-dose maximum is reached or RASS of -3 to -5   *Hold medications and notify provider if CNS depression, respirations < 10/min, or RASS of -3 to -5           Medications to be administered adjunctively if more than 2 grams of phenobarbital is needed for stabilization of withdrawal; require attending approval     Dexmedetomidine infusion 0 1-1mcg/kg/hr IV infusion, titratable to reduced agitation (Goal: RASS -2)   Ketamine   o Acute agitated delirium: 1-2 mg/kg IV or 4-5 mg/kg IM  o Refractory withdrawal: 0 1-1mg/kg/hr IV infusion, titratable to reduced agitation (Goal: RASS -2)    Further evaluation, screening and treatment:  Evaluate complete metabolic panel, transaminases, INR, and lipase  Assess hepatic ultrasound for any sign of alcoholic liver disease or cirrhosis, and ultimately refer for further hepatic evaluation and care as/if indicated  Additional medications for ethanol associated malnutrition: Thiamine 100 mg IV daily, increase to 500 mg TID for signs/symptoms of Wernicke's Encephalopathy or Wernicke Korsakoff Syndrome   Folic acid 1 mg IV daily   Multivitamin PO daily      Will offer first monthly injection of Naltrexone 380 mg IM, once patient is stabilized, as it has been shown to assist in decreasing cravings for ethanol  Evaluate and treat for coexisting substance use, such as opioids and nicotine  Discuss risk factors for infectious disease, such as history of intravenous drug abuse, and offer hepatitis and HIV screening if indicated  Case management consultation to assist with coordination of subsequent treatment after discharge  Hx and PE limited by: not limited     HPI: Ray Gee is a 43y o  year old male with past medical history of alcohol use disorder, essential hypertension, intermittent asthma, and tobacco use disorder presented to emergency department requesting assistance with alcohol detoxification  He reports multiple self attempts at cessation in the past with minimal success  Recently his alcohol consumption has progressed to ten shots of liquor and several beers every night due to not working  He denies any history of withdrawal related seizures  He is considering beginning naltrexone before discharge  Is interested in outpatient resources upon discharge       Preferred alcoholic beverage(s): hard liquor   Quantity and frequency of alcohol intake: >10 shots and couple of beers  Use of any ethanol substitutes (toxic alcohols): no  Date/Time of last alcohol intake: 4/26 PM  Current signs and symptoms of ethanol withdrawal: anxiety, insomnia, tremor and hypertension    SEWS Total Score: 11 (4/27/2023  2:07 PM)        Ethanol Withdrawal History  Previous ethanol withdrawal? yes  Prior inpatient treatment for ethanol withdrawal? no  Prior outpatient treatment for ethanol withdrawal? no  History of seizures with prior ethanol withdrawal? no  Prior treatment with naltrexone (Vivitrol)? no  Current treatment with naltrexone (Vivitrol)? no  Other current treatment for ethanol use disorder? no  Co-existing substance use? no    Review of PDMP: yes     Social History     Substance and Sexual Activity   Alcohol Use Yes    Comment: 10-12 shots of vodka and 4-6 beers     Social History     Substance and Sexual Activity   Drug Use Never     Social History     Tobacco Use   Smoking Status Some Days    Packs/day: 1 00    Types: Cigarettes   Smokeless Tobacco Never       Review of Systems   Constitutional: Negative for diaphoresis and fever  Eyes: Negative for visual disturbance  Respiratory: Negative for cough and shortness of breath  Cardiovascular: Negative for chest pain and palpitations  Gastrointestinal: Negative for abdominal distention, abdominal pain, nausea and vomiting  Neurological: Positive for tremors  Psychiatric/Behavioral: Negative for confusion, hallucinations and suicidal ideas         Historical Information   Past Medical History:   Diagnosis Date    GERD (gastroesophageal reflux disease)     Hypertension     Rectal bleed      Past Surgical History:   Procedure Laterality Date    EYE SURGERY      SHOULDER SURGERY Right 12/08/2022     Family History   Problem Relation Age of Onset    Hypertension Father     Colon cancer Paternal Aunt      Social History   Marital Status:    Patient Pre-hospital Living Situation: stable  Patient Pre-hospital Level of Mobility: independent   Patient Pre-hospital Diet Restrictions: none    No Known Allergies    Prior to Admission medications    Medication Sig Start Date End Date Taking? Authorizing Provider   albuterol (Ventolin HFA) 90 mcg/act inhaler Inhale 2 puffs every 6 (six) hours as needed for wheezing 3/14/22   Doc Morales MD   amLODIPine (NORVASC) 10 mg tablet Take 1 tablet (10 mg total) by mouth daily 12/6/22   Rosana Del Cid PA-C   benzonatate (TESSALON PERLES) 100 mg capsule Take 1 capsule (100 mg total) by mouth 3 (three) times a day as needed for cough  Patient not taking: Reported on 4/12/2023 10/11/21   Patricia Ness PA-C   bisacodyl (DULCOLAX) 5 mg EC tablet Take as directed as per written office instructions  Patient not taking: Reported on 10/11/2021 10/14/20   Sher Rowe PA-C   Breo Ellipta 200-25 MCG/INH inhaler INHALE 1 PUFF DAILY RINSE MOUTH AFTER USE  4/6/22 5/6/22  Doc Morales MD   ergocalciferol (VITAMIN D2) 50,000 units TAKE 1 CAPSULE BY MOUTH ONE TIME PER WEEK  Patient not taking: No sig reported 3/1/21   Doc Morales MD   folic acid (KP Folic Acid) 1 mg tablet Take 1 tablet (1 mg total) by mouth daily 4/12/23   Doc Morales MD   losartan (COZAAR) 25 mg tablet Take 1 tablet (25 mg total) by mouth daily 11/18/21   Doc Morales MD   Magnesium 250 MG TABS Take 1 tablet (250 mg total) by mouth in the morning 4/12/23   Doc Morales MD   Multiple Vitamin (multivitamin) tablet Take 1 tablet by mouth daily  Patient not taking: Reported on 10/11/2021    Historical Provider, MD   Na Sulfate-K Sulfate-Mg Sulf 17 5-3 13-1 6 GM/177ML SOLN Take 1 kit by mouth once for 1 dose Colonoscopy prep   10/13/20 10/13/20  Marilou Simpson PA-C   nabumetone (RELAFEN) 750 mg tablet nabumetone 750 mg tablet   TAKE 1 TABLET BY MOUTH TWICE A DAY  Patient not taking: Reported on 4/12/2023 11/7/22   Historical Provider, MD   nicotine (NICODERM CQ) 14 mg/24hr TD 24 hr patch Place 1 patch on the skin every 24 hours Start after 21 mg patches are finished  Patient not taking: Reported on 10/11/2021 2/25/21   Susy Dodge MD   nicotine (NICODERM CQ) 21 mg/24 hr TD 24 hr patch Place 1 patch on the skin every 24 hours  Patient not taking: Reported on 10/11/2021 2/25/21   Susy Dodge MD   nicotine (NICODERM CQ) 7 mg/24hr TD 24 hr patch Place 1 patch on the skin every 24 hours Start after 14 mg patches are finished  Patient not taking: Reported on 10/11/2021 2/25/21   Susy Dodge MD   polyethylene glycol [facility]) 17 GM/SCOOP powder Take as directed as per written office instructions  Patient not taking: Reported on 10/11/2021 10/14/20   Jammie Velarde PA-C   Thiamine Mononitrate (VITAMIN B1) 100 mg tablet Take 1 tablet (100 mg total) by mouth daily 4/12/23   Susy Dodge MD   traMADol Ellin Spikes) 50 mg tablet every 6 (six) hours  Patient not taking: Reported on 4/12/2023    Historical Provider, MD       Current Facility-Administered Medications   Medication Dose Route Frequency    acetaminophen (TYLENOL) tablet 650 mg  650 mg Oral Q6H PRN    albuterol (PROVENTIL HFA,VENTOLIN HFA) inhaler 2 puff  2 puff Inhalation Q4H PRN    [START ON 4/28/2023] amLODIPine (NORVASC) tablet 10 mg  10 mg Oral Daily    dextrose 5 % and sodium chloride 0 9 % infusion  100 mL/hr Intravenous Continuous    diazepam (VALIUM) injection 10 mg  10 mg Intravenous Once    diazepam (VALIUM) injection 2 5 mg  2 5 mg Intravenous Once    enoxaparin (LOVENOX) subcutaneous injection 40 mg  40 mg Subcutaneous Daily    folic acid (FOLVITE) tablet 1 mg  1 mg Oral Daily    guaiFENesin (MUCINEX) 12 hr tablet 600 mg  600 mg Oral Q12H [address]    [START ON 4/28/2023] losartan (COZAAR) tablet 25 mg  25 mg Oral Daily    magnesium sulfate 2 g/50 mL IVPB (premix) 2 g  2 g Intravenous Once    magnesium sulfate IVPB (premix) SOLN 1 g  1 g Intravenous Once    multivitamin-minerals (CENTRUM) tablet 1 tablet  1 tablet Oral Daily    nicotine (NICODERM CQ) 21 mg/24 hr TD 24 hr patch 1 patch  1 patch Transdermal Daily    ondansetron (ZOFRAN) injection 4 mg  4 mg Intravenous Q6H PRN    thiamine (VITAMIN B1) 100 mg in sodium chloride 0 9 % 50 mL IVPB  100 mg Intravenous Once    thiamine tablet 100 mg  100 mg Oral Daily    traZODone (DESYREL) tablet 50 mg  50 mg Oral HS PRN       Continuous Infusions:dextrose 5 % and sodium chloride 0 9 %, 100 mL/hr, Last Rate: 100 mL/hr (04/27/23 1435)             Objective       Intake/Output Summary (Last 24 hours) at 4/27/2023 1437  Last data filed at 4/27/2023 1249  Gross per 24 hour   Intake 50 ml   Output --   Net 50 ml       Invasive Devices:   Peripheral IV 04/27/23 Left;Ventral (anterior) Hand (Active)   Site Assessment WDL 04/27/23 1210   Dressing Type Transparent 04/27/23 1210   Line Status Blood return noted 04/27/23 1210   Dressing Status Clean;Dry; Intact 04/27/23 1210       Vitals   Vitals:    04/27/23 1136 04/27/23 1142 04/27/23 1338 04/27/23 1402   BP: (!) 171/103 (!) 171/103 (!) 154/106 (!) 166/115   TempSrc: Tympanic   Temporal   Pulse: 98 98 98 105   Resp: 18  16 18   Patient Position - Orthostatic VS: Sitting  Lying Lying   Temp: 97 7 °F (36 5 °C)   99 1 °F (37 3 °C)       Physical Exam  Vitals reviewed  HENT:      Head: Normocephalic  Mouth/Throat:      Mouth: Mucous membranes are moist       Pharynx: Oropharynx is clear  Eyes:      Conjunctiva/sclera: Conjunctivae normal       Pupils: Pupils are equal, round, and reactive to light  Cardiovascular:      Rate and Rhythm: Regular rhythm  Tachycardia present  Pulses: Normal pulses  Heart sounds: Normal heart sounds  Pulmonary:      Effort: Pulmonary effort is normal  No respiratory distress  Breath sounds: Examination of the left-upper field reveals wheezing and rhonchi  Examination of the left-middle field reveals wheezing and rhonchi  Examination of the left-lower field reveals wheezing and rhonchi  Wheezing and rhonchi present     Abdominal:      General: Bowel sounds are normal       Palpations: Abdomen is soft  Tenderness: There is no abdominal tenderness  Musculoskeletal:         General: Normal range of motion  Cervical back: Normal range of motion  Skin:     General: Skin is warm and dry  Neurological:      General: No focal deficit present  Mental Status: He is alert and oriented to person, place, and time  Mental status is at baseline  Motor: Tremor (BL UE intention tremor ) present  Coordination: Coordination is intact  Psychiatric:         Attention and Perception: He does not perceive visual hallucinations  Mood and Affect: Mood is anxious  Speech: Speech normal          Behavior: Behavior normal  Behavior is cooperative  Thought Content: Thought content normal            Data:    EKG, Pathology, and Other Studies: I have personally reviewed pertinent reports          Lab Results:  CBC ETOH     Lab Results   Component Value Date    WBC 3 89 (L) 04/27/2023    RBC 4 12 04/27/2023    HGB 14 2 04/27/2023    HCT 40 8 04/27/2023    MCV 99 (H) 04/27/2023    MCH 34 5 (H) 04/27/2023    MCHC 34 8 04/27/2023    RDW 12 8 04/27/2023     04/27/2023    MPV 8 7 (L) 04/27/2023      No results found for: LACTICACID   CMP UA         Component Value Date/Time    K 3 7 04/27/2023 1210     04/27/2023 1210    CO2 22 04/27/2023 1210    BUN 9 04/27/2023 1210    CREATININE 0 80 04/27/2023 1210         Component Value Date/Time    CALCIUM 8 9 04/27/2023 1210    ALKPHOS 48 04/27/2023 1210     (H) 04/27/2023 1210    ALT 61 (H) 04/27/2023 1210      Lab Results   Component Value Date    CLARITYU Clear 11/07/2020    COLORU Yellow 11/07/2020    SPECGRAV 1 022 11/07/2020    PHUR 5 5 11/07/2020    GLUCOSEU Negative 11/07/2020    KETONESU Negative 11/07/2020    BLOODU Negative 11/07/2020    PROTEIN UA Negative 11/07/2020    NITRITE Negative 11/07/2020    BILIRUBINUR Negative 11/07/2020    UROBILINOGEN 0 2 11/07/2020 LEUKOCYTESUR Negative 11/07/2020    WBCUA None Seen 11/07/2020    RBCUA None Seen 11/07/2020    HYALINE None Seen 11/07/2020    BACTERIA None Seen 11/07/2020    EPIS None Seen 11/07/2020        Liver Function Test: ASA     Lab Results   Component Value Date    TBILI 0 62 04/27/2023    ALKPHOS 48 04/27/2023     (H) 04/27/2023    ALT 61 (H) 04/27/2023    TP 7 0 04/27/2023    ALB 4 5 04/27/2023      Lab Results   Component Value Date    SALICYLATE <5 58/09/2474      Troponin APAP     No results found for: TROPONINI   Lab Results   Component Value Date    ACTMNPHEN <10 (L) 04/27/2023      VBG HCG     No results found for: PHVEN, UJE5JWY, PO2VEN, UQY9IIE, BEVEN, H5UMHALAZ, Z1BHPDR   No results found for: HCGQUANT   ABG Urine Drug Screen     No results found for: PHART, XGN0AAY, PO2ART, CUW9UKI, BEART, Z6YQCFZEU, O2HGB, SOURC, DELMIS, VTAC, ACRATE, INSPIREDAIR, PEEP   Lab Results   Component Value Date    AMPMETHUR Negative 04/27/2023    BARBTUR Negative 04/27/2023    BDZUR Negative 04/27/2023    COCAINEUR Negative 04/27/2023    METHADONEUR Negative 04/27/2023    OPIATEUR Negative 04/27/2023    PCPUR Negative 04/27/2023    THCUR Negative 04/27/2023    OXYCODONEUR Negative 04/27/2023      Lactate INR     No results found for: LACTICACID   No results found for: INR   PTT Protime     No results found for: PTT     No results found for: PROTIME           Imaging Studies: I have personally reviewed pertinent reports  Counseling / Coordination of Care  Total floor / unit time spent today 40 minutes  Greater than 50% of total time was spent with the patient and / or family counseling and / or coordination of care  ** Please Note: This note has been constructed using a voice recognition system   **

## 2023-04-27 NOTE — ASSESSMENT & PLAN NOTE
Patient with a history of chronic daily alcohol use  Last drink 4/26 PM  Serum alcohol 160 in the ED  Continue [facility] protocol for medical management of alcohol withdrawal  Current alcohol withdrawal signs/symptoms include anxiety, tremor, hypertension   Continue clinical monitoring of symptom progression   Total phenobarbital given 1300 mg thus far (last given 130 mg @0853)     Continuous pulse ox and telemetry monitoring

## 2023-04-27 NOTE — ASSESSMENT & PLAN NOTE
Recent Labs     04/27/23  1210 04/28/23  0538   K 3 7 3 1*   MG 1 6* 1 7*   Mag 1 6 on admission  Supplemented   Recheck Mag level in the AM and optimize electrolytes

## 2023-04-27 NOTE — ASSESSMENT & PLAN NOTE
Lab Results   Component Value Date    ALT 51 04/28/2023    AST 95 (H) 04/28/2023    ALKPHOS 50 04/28/2023    TBILI 1 12 (H) 04/28/2023        · Elevated LFTs on admission as above  · Likely 2/2 chronic alcohol use/alcoholic liver disease  · Pt denies any abdominal pain   · Trending down with improvement, will discontinue IV fluids  · Continue routine monitoring of CMP   · Encourage alcohol cessation

## 2023-04-27 NOTE — ASSESSMENT & PLAN NOTE
Pt with a h/o chronic heavy alcohol use for without withdrawal related seizures   Drinks >10 shots of liquor, and couple of beers daily   Considering naltrexone prior to discharge  Withdrawal management as above  Daily thiamine/folic acid supplementation, and MVI  Consult case management for assistance with aftercare resources - pt interested in outpatient resources upon discharge

## 2023-04-27 NOTE — ASSESSMENT & PLAN NOTE
· History of mild intermittent asthma that he reports is usually exacerbated with smoking  · Currently endorses productive cough with clear sputum and wheezing  · On exam inspiratory/expiratory wheeze throughout left lung fields   · PRN albuterol inhaler  Follow-up on CXR read     · Continue to monitor   · Encourage cessation

## 2023-04-27 NOTE — ED PROVIDER NOTES
History  Chief Complaint   Patient presents with    Alcohol Intoxication     Sent by PCP - drinks every day 10-12 shots of vodka and a 4-6 beers a night       Patient is a 51-year-old male coming in for detox for alcohol  Patient reports that he is drinking heavily, 10+ drinks per day, including shots and beer  Patient reports has been doing this for the last couple months, he believes that is partially due to his surgery of his shoulder, unable to be able to do much eval's, as well as depression  Patient reports that his last drink was approximately at 9:30 PM yesterday, approximately 12 hours prior to arrival   Patient reports that he has no history of seizures, but does feel a bit shaky at this time  Alcohol Intoxication  Suspected agents:  Alcohol  Associated symptoms: no abdominal pain, no hallucinations, no loss of consciousness, no nausea, no seizures and no vomiting        Prior to Admission Medications   Prescriptions Last Dose Informant Patient Reported? Taking? Breo Ellipta 200-25 MCG/INH inhaler   No No   Sig: INHALE 1 PUFF DAILY RINSE MOUTH AFTER USE  Magnesium 250 MG TABS   No No   Sig: Take 1 tablet (250 mg total) by mouth in the morning   Multiple Vitamin (multivitamin) tablet   Yes No   Sig: Take 1 tablet by mouth daily   Patient not taking: Reported on 10/11/2021   Na Sulfate-K Sulfate-Mg Sulf 17 5-3 13-1 6 GM/177ML SOLN   No No   Sig: Take 1 kit by mouth once for 1 dose Colonoscopy prep     Thiamine Mononitrate (VITAMIN B1) 100 mg tablet   No No   Sig: Take 1 tablet (100 mg total) by mouth daily   albuterol (Ventolin HFA) 90 mcg/act inhaler   No No   Sig: Inhale 2 puffs every 6 (six) hours as needed for wheezing   amLODIPine (NORVASC) 10 mg tablet   No No   Sig: Take 1 tablet (10 mg total) by mouth daily   benzonatate (TESSALON PERLES) 100 mg capsule   No No   Sig: Take 1 capsule (100 mg total) by mouth 3 (three) times a day as needed for cough   Patient not taking: Reported on 4/12/2023   bisacodyl (DULCOLAX) 5 mg EC tablet   No No   Sig: Take as directed as per written office instructions   Patient not taking: Reported on 10/11/2021   ergocalciferol (VITAMIN D2) 50,000 units   No No   Sig: TAKE 1 CAPSULE BY MOUTH ONE TIME PER WEEK   Patient not taking: No sig reported   folic acid ( Folic Acid) 1 mg tablet   No No   Sig: Take 1 tablet (1 mg total) by mouth daily   losartan (COZAAR) 25 mg tablet   No No   Sig: Take 1 tablet (25 mg total) by mouth daily   nabumetone (RELAFEN) 750 mg tablet   Yes No   Sig: nabumetone 750 mg tablet   TAKE 1 TABLET BY MOUTH TWICE A DAY   Patient not taking: Reported on 4/12/2023   nicotine (NICODERM CQ) 14 mg/24hr TD 24 hr patch   No No   Sig: Place 1 patch on the skin every 24 hours Start after 21 mg patches are finished   Patient not taking: Reported on 10/11/2021   nicotine (NICODERM CQ) 21 mg/24 hr TD 24 hr patch   No No   Sig: Place 1 patch on the skin every 24 hours   Patient not taking: Reported on 10/11/2021   nicotine (NICODERM CQ) 7 mg/24hr TD 24 hr patch   No No   Sig: Place 1 patch on the skin every 24 hours Start after 14 mg patches are finished   Patient not taking: Reported on 10/11/2021   polyethylene glycol (GLYCOLAX) 17 GM/SCOOP powder   No No   Sig: Take as directed as per written office instructions   Patient not taking: Reported on 10/11/2021   traMADol (ULTRAM) 50 mg tablet   Yes No   Sig: every 6 (six) hours   Patient not taking: Reported on 4/12/2023      Facility-Administered Medications: None       Past Medical History:   Diagnosis Date    GERD (gastroesophageal reflux disease)     Hypertension     Rectal bleed        Past Surgical History:   Procedure Laterality Date    EYE SURGERY      SHOULDER SURGERY Right 12/08/2022       Family History   Problem Relation Age of Onset    Hypertension Father     Colon cancer Paternal Aunt      I have reviewed and agree with the history as documented      E-Cigarette/Vaping    E-Cigarette Use Never User      E-Cigarette/Vaping Substances    Nicotine Yes     THC No     CBD No     Flavoring No     Other No     Unknown No      Social History     Tobacco Use    Smoking status: Some Days     Packs/day: 1 00     Types: Cigarettes    Smokeless tobacco: Never   Vaping Use    Vaping Use: Never used   Substance Use Topics    Alcohol use: Yes     Comment: 10-12 shots of vodka and 4-6 beers    Drug use: Never       Review of Systems   Constitutional: Negative for fatigue and fever  Cardiovascular: Negative for chest pain  Gastrointestinal: Negative for abdominal pain, nausea and vomiting  Neurological: Negative for seizures and loss of consciousness  Psychiatric/Behavioral: Negative for hallucinations  Physical Exam  Physical Exam  Vitals reviewed  Constitutional:       Appearance: Normal appearance  He is normal weight  HENT:      Head: Normocephalic and atraumatic  Right Ear: External ear normal       Left Ear: External ear normal       Nose: Nose normal    Eyes:      Conjunctiva/sclera: Conjunctivae normal    Cardiovascular:      Rate and Rhythm: Normal rate  Pulmonary:      Effort: Pulmonary effort is normal    Musculoskeletal:         General: Normal range of motion  Cervical back: Normal range of motion  Skin:     General: Skin is warm and dry  Neurological:      Mental Status: He is alert           Vital Signs  ED Triage Vitals   Temperature Pulse Respirations Blood Pressure SpO2   04/27/23 1136 04/27/23 1136 04/27/23 1136 04/27/23 1136 04/27/23 1136   97 7 °F (36 5 °C) 98 18 (!) 171/103 97 %      Temp Source Heart Rate Source Patient Position - Orthostatic VS BP Location FiO2 (%)   04/27/23 1136 04/27/23 1136 04/27/23 1136 04/27/23 1136 --   Tympanic Monitor Sitting Left arm       Pain Score       04/27/23 1338       No Pain           Vitals:    04/27/23 1136 04/27/23 1142 04/27/23 1338 04/27/23 1402   BP: (!) 171/103 (!) 171/103 (!) 154/106 (!) 166/115 Pulse: 98 98 98 105   Patient Position - Orthostatic VS: Sitting  Lying Lying         Visual Acuity  Visual Acuity    Flowsheet Row Most Recent Value   L Pupil Size (mm) 2   R Pupil Size (mm) 2   L Pupil Shape Round   R Pupil Shape Round          ED Medications  Medications   diazepam (VALIUM) injection 2 5 mg (has no administration in time range)   thiamine (VITAMIN B1) 100 mg in sodium chloride 0 9 % 50 mL IVPB (100 mg Intravenous Not Given 4/27/23 1336)   magnesium sulfate IVPB (premix) SOLN 1 g (has no administration in time range)   dextrose 5 % and sodium chloride 0 9 % infusion (has no administration in time range)   acetaminophen (TYLENOL) tablet 650 mg (has no administration in time range)   thiamine tablet 100 mg (has no administration in time range)   folic acid (FOLVITE) tablet 1 mg (has no administration in time range)   multivitamin-minerals (CENTRUM) tablet 1 tablet (has no administration in time range)   enoxaparin (LOVENOX) subcutaneous injection 40 mg (has no administration in time range)   traZODone (DESYREL) tablet 50 mg (has no administration in time range)   nicotine (NICODERM CQ) 21 mg/24 hr TD 24 hr patch 1 patch (has no administration in time range)   magnesium sulfate 2 g/50 mL IVPB (premix) 2 g (has no administration in time range)   ondansetron (ZOFRAN) injection 4 mg (has no administration in time range)   losartan (COZAAR) tablet 25 mg (has no administration in time range)   amLODIPine (NORVASC) tablet 10 mg (has no administration in time range)   folic acid 1 mg in sodium chloride 0 9 % 50 mL IVPB (0 mg Intravenous Stopped 4/27/23 1249)   sodium chloride 0 9 % bolus 1,000 mL (1,000 mL Intravenous New Bag 4/27/23 1210)   thiamine (VITAMIN B1) 400 mg in sodium chloride 0 9 % 50 mL IVPB (400 mg Intravenous New Bag 4/27/23 1329)       Diagnostic Studies  Results Reviewed     Procedure Component Value Units Date/Time    Salicylate level [[id number]]  (Normal) Collected: 04/27/23 6399 Lab Status: Final result Specimen: Blood from Arm, Left Updated: 33/20/88 0224     Salicylate Lvl <5 mg/dL     Acetaminophen level-If concentration is detectable, please discuss with medical  on call   [[id number]]  (Abnormal) Collected: 04/27/23 1210    Lab Status: Final result Specimen: Blood from Arm, Left Updated: 04/27/23 1309     Acetaminophen Level <10 ug/mL     Comprehensive metabolic panel [[id number]]  (Abnormal) Collected: 04/27/23 1210    Lab Status: Final result Specimen: Blood from Arm, Left Updated: 04/27/23 1241     Sodium 137 mmol/L      Potassium 3 7 mmol/L      Chloride 100 mmol/L      CO2 22 mmol/L      ANION GAP 15 mmol/L      BUN 9 mg/dL      Creatinine 0 80 mg/dL      Glucose 142 mg/dL      Calcium 8 9 mg/dL       U/L      ALT 61 U/L      Alkaline Phosphatase 48 U/L      Total Protein 7 0 g/dL      Albumin 4 5 g/dL      Total Bilirubin 0 62 mg/dL      eGFR 110 ml/min/1 73sq m     Narrative:      [place] guidelines for Chronic Kidney Disease (CKD):     Stage 1 with normal or high GFR (GFR > 90 mL/min/1 73 square meters)    Stage 2 Mild CKD (GFR = 60-89 mL/min/1 73 square meters)    Stage 3A Moderate CKD (GFR = 45-59 mL/min/1 73 square meters)    Stage 3B Moderate CKD (GFR = 30-44 mL/min/1 73 square meters)    Stage 4 Severe CKD (GFR = 15-29 mL/min/1 73 square meters)    Stage 5 End Stage CKD (GFR <15 mL/min/1 73 square meters)  Note: GFR calculation is accurate only with a steady state creatinine    Magnesium [[id number]]  (Abnormal) Collected: 04/27/23 1210    Lab Status: Final result Specimen: Blood from Arm, Left Updated: 04/27/23 1241     Magnesium 1 6 mg/dL     Ethanol [[id number]]  (Abnormal) Collected: 04/27/23 1210    Lab Status: Final result Specimen: Blood from Arm, Left Updated: 04/27/23 1241     Ethanol Lvl 160 mg/dL     Rapid drug screen, urine [[id number]]  (Normal) Collected: 04/27/23 1200    Lab Status: Final result Specimen: Urine, Other Updated: 04/27/23 1236     Amph/Meth UR Negative     Barbiturate Ur Negative     Benzodiazepine Urine Negative     Cocaine Urine Negative     Methadone Urine Negative     Opiate Urine Negative     PCP Ur Negative     THC Urine Negative     Oxycodone Urine Negative    Narrative:      FOR MEDICAL PURPOSES ONLY  IF CONFIRMATION NEEDED PLEASE CONTACT THE LAB WITHIN 5 DAYS  Drug Screen Cutoff Levels:  AMPHETAMINE/METHAMPHETAMINES  1000 ng/mL  BARBITURATES     200 ng/mL  BENZODIAZEPINES     200 ng/mL  COCAINE      300 ng/mL  METHADONE      300 ng/mL  OPIATES      300 ng/mL  PHENCYCLIDINE     25 ng/mL  THC       50 ng/mL  OXYCODONE      100 ng/mL    CBC and differential [[id number]]  (Abnormal) Collected: 04/27/23 1210    Lab Status: Final result Specimen: Blood from Hand, Left Updated: 04/27/23 1220     WBC 3 89 Thousand/uL      RBC 4 12 Million/uL      Hemoglobin 14 2 g/dL      Hematocrit 40 8 %      MCV 99 fL      MCH 34 5 pg      MCHC 34 8 g/dL      RDW 12 8 %      MPV 8 7 fL      Platelets 601 Thousands/uL      nRBC 0 /100 WBCs      Neutrophils Relative 61 %      Immat GRANS % 1 %      Lymphocytes Relative 28 %      Monocytes Relative 8 %      Eosinophils Relative 1 %      Basophils Relative 1 %      Neutrophils Absolute 2 45 Thousands/µL      Immature Grans Absolute 0 02 Thousand/uL      Lymphocytes Absolute 1 09 Thousands/µL      Monocytes Absolute 0 29 Thousand/µL      Eosinophils Absolute 0 02 Thousand/µL      Basophils Absolute 0 02 Thousands/µL                  No orders to display              Procedures  Procedures         ED Course  ED Course as of 04/27/23 1414   Thu Apr 27, 2023   1229 ECG 12 lead  No STEMI  Incomplete left bundle branch block  No previous ekg  No CP     1241 Comprehensive metabolic panel(!)  Slight anion gap  Giving fluids  Will give additional thiamine   Transaminitis in line with drinking pattern   1242 MEDICAL ALCOHOL(!): 160                               SBIRT 20yo+ Flowsheet Row Most Recent Value   Initial Alcohol Screen: US AUDIT-C     1  How often do you have a drink containing alcohol? 6 Filed at: 04/27/2023 1139   2  How many drinks containing alcohol do you have on a typical day you are drinking? 6 Filed at: 04/27/2023 1139   3a  Male UNDER 65: How often do you have five or more drinks on one occasion? 6 Filed at: 04/27/2023 1139   3b  FEMALE Any Age, or MALE 65+: How often do you have 4 or more drinks on one occassion? 0 Filed at: 04/27/2023 1139   Audit-C Score 18 Filed at: 04/27/2023 1139   Full Alcohol Screen: US AUDIT    4  How often during the last year have you found that you were not able to stop drinking once you had started? 4 Filed at: 04/27/2023 1139   5  How often during past year have you failed to do what was normally expected of you because of drinking? 4 Filed at: 04/27/2023 1139   6  How often in past year have you needed a first drink in the morning to get yourself going after a heavy drinking session? 4 Filed at: 04/27/2023 1139   7  How often in past year have you had feeling of guilt or remorse after drinking? 4 Filed at: 04/27/2023 1139   8  How often in past year have you been unable to remember what happened night before because you had been drinking? 3 Filed at: 04/27/2023 1139   9  Have you or someone else been injured as a result of your drinking? 0 Filed at: 04/27/2023 1139   10  Has a relative, friend, doctor or other health worker been concerned about your drinking and suggested you cut down? 4 Filed at: 04/27/2023 1139   AUDIT Total Score 41 Filed at: 04/27/2023 1139   KM: How many times in the past year have you    Used an illegal drug or used a prescription medication for non-medical reasons? Never Filed at: 04/27/2023 1139                    Medical Decision Making  Patient is a 58-year-old male coming in for evaluation of alcohol abuse    Patient is in no acute distress at this time, patient will be admitted for further evaluation  Slight transaminitis, pattern matches alcohol abuse  Patient also has a 160 ethanol level at this time, does feel shaky though, likely his baseline is higher  Patient is in no acute distress, started on thiamine, folic acid, fluids, and admitted for further evaluation    Amount and/or Complexity of Data Reviewed  Labs: ordered  Decision-making details documented in ED Course  ECG/medicine tests:  Decision-making details documented in ED Course  Risk  Prescription drug management  Decision regarding hospitalization  Disposition  Final diagnoses:   Alcoholic intoxication without complication ([place] [address] )   Alcohol withdrawal ([place] [address] )   Alcohol abuse     Time reflects when diagnosis was documented in both MDM as applicable and the Disposition within this note     Time User Action Codes Description Comment    4/27/2023  1:09 PM Debroah Neigh Add [K59 174] Alcoholic intoxication without complication ([place] [address] )     3/66/7594  1:09 PM Debroah Neigh Add [F10 939] Alcohol withdrawal ([place] [address] )     4/27/2023  1:09 PM Debroah Neigh Add [F10 10] Alcohol abuse     4/27/2023  1:09 PM Gabbie Caul [H64 901] Alcoholic intoxication without complication ([place] [address] )     0/40/9954  1:09 PM Debroah Neigh Modify [F10 939] Alcohol withdrawal [facility])       ED Disposition     ED Disposition   Admit    Condition   Stable    Date/Time   Thu Apr 27, 2023  1:09 PM    Comment   Case was discussed with Maxime Lara PA-C and the patient's admission status was agreed to be Admission Status: inpatient status to the service of Dr Lexi Foster              Follow-up Information    None         Current Discharge Medication List      CONTINUE these medications which have NOT CHANGED    Details   albuterol (Ventolin HFA) 90 mcg/act inhaler Inhale 2 puffs every 6 (six) hours as needed for wheezing  Qty: 18 g, Refills: 5    Comments: Substitution to a formulary equivalent within the same pharmaceutical class is authorized  Associated Diagnoses: Chronic cough      amLODIPine (NORVASC) 10 mg tablet Take 1 tablet (10 mg total) by mouth daily  Qty: 30 tablet, Refills: 1    Comments: Please let the patient know that he needs to see Dr Nara Valverde for any further refills  Thanks  Associated Diagnoses: Uncontrolled hypertension      benzonatate (TESSALON PERLES) 100 mg capsule Take 1 capsule (100 mg total) by mouth 3 (three) times a day as needed for cough  Qty: 20 capsule, Refills: 0    Associated Diagnoses: Acute sinusitis, recurrence not specified, unspecified location; Bronchitis      bisacodyl (DULCOLAX) 5 mg EC tablet Take as directed as per written office instructions  Qty: 2 tablet, Refills: 0    Associated Diagnoses: Preventative health care      Breo Ellipta 200-25 MCG/INH inhaler INHALE 1 PUFF DAILY RINSE MOUTH AFTER USE  Qty: 60 each, Refills: 3    Associated Diagnoses: Chronic cough      ergocalciferol (VITAMIN D2) 50,000 units TAKE 1 CAPSULE BY MOUTH ONE TIME PER WEEK  Qty: 4 capsule, Refills: 1    Associated Diagnoses: Vitamin D deficiency      folic acid ( Folic Acid) 1 mg tablet Take 1 tablet (1 mg total) by mouth daily  Qty: 90 tablet, Refills: 1    Associated Diagnoses: Chronic alcohol use      losartan (COZAAR) 25 mg tablet Take 1 tablet (25 mg total) by mouth daily  Qty: 30 tablet, Refills: 3    Associated Diagnoses: Hypertension, unspecified type      Magnesium 250 MG TABS Take 1 tablet (250 mg total) by mouth in the morning  Qty: 90 tablet, Refills: 1    Associated Diagnoses: Chronic alcohol use      Multiple Vitamin (multivitamin) tablet Take 1 tablet by mouth daily      Na Sulfate-K Sulfate-Mg Sulf 17 5-3 13-1 6 GM/177ML SOLN Take 1 kit by mouth once for 1 dose Colonoscopy prep    Qty: 2 Bottle, Refills: 0    Associated Diagnoses: Hematochezia; Preventative health care      nabumetone (RELAFEN) 750 mg tablet nabumetone 750 mg tablet   TAKE 1 TABLET BY MOUTH TWICE A DAY      nicotine (NICODERM CQ) 14 mg/24hr TD 24 hr patch Place 1 patch on the skin every 24 hours Start after 21 mg patches are finished  Qty: 28 patch, Refills: 0    Associated Diagnoses: Tobacco use      nicotine (NICODERM CQ) 21 mg/24 hr TD 24 hr patch Place 1 patch on the skin every 24 hours  Qty: 28 patch, Refills: 0    Associated Diagnoses: Tobacco use      nicotine (NICODERM CQ) 7 mg/24hr TD 24 hr patch Place 1 patch on the skin every 24 hours Start after 14 mg patches are finished  Qty: 28 patch, Refills: 0    Associated Diagnoses: Tobacco use      polyethylene glycol (GLYCOLAX) 17 GM/SCOOP powder Take as directed as per written office instructions  Qty: 238 g, Refills: 0    Associated Diagnoses: Preventative health care      Thiamine Mononitrate (VITAMIN B1) 100 mg tablet Take 1 tablet (100 mg total) by mouth daily  Qty: 90 tablet, Refills: 1    Associated Diagnoses: Chronic alcohol use      traMADol (ULTRAM) 50 mg tablet every 6 (six) hours             No discharge procedures on file      PDMP Review       Value Time User    PDMP Reviewed  Yes 10/5/2020 10:51 AM Elijah Avilez MD          ED Provider  Electronically Signed by           Magaly Riggs PA-C  04/27/23 2665

## 2023-04-28 PROBLEM — E87.6 HYPOKALEMIA: Status: ACTIVE | Noted: 2023-04-28

## 2023-04-28 PROBLEM — D69.6 THROMBOCYTOPENIA (HCC): Status: ACTIVE | Noted: 2023-04-28

## 2023-04-28 PROBLEM — D72.819 LEUKOPENIA: Status: ACTIVE | Noted: 2023-04-28

## 2023-04-28 LAB
ALBUMIN SERPL BCP-MCNC: 3.8 G/DL (ref 3.5–5)
ALP SERPL-CCNC: 50 U/L (ref 34–104)
ALT SERPL W P-5'-P-CCNC: 51 U/L (ref 7–52)
ANION GAP SERPL CALCULATED.3IONS-SCNC: 8 MMOL/L (ref 4–13)
AST SERPL W P-5'-P-CCNC: 95 U/L (ref 13–39)
BILIRUB SERPL-MCNC: 1.12 MG/DL (ref 0.2–1)
BUN SERPL-MCNC: 4 MG/DL (ref 5–25)
CALCIUM SERPL-MCNC: 8.1 MG/DL (ref 8.4–10.2)
CHLORIDE SERPL-SCNC: 103 MMOL/L (ref 96–108)
CO2 SERPL-SCNC: 27 MMOL/L (ref 21–32)
CREAT SERPL-MCNC: 0.79 MG/DL (ref 0.6–1.3)
ERYTHROCYTE [DISTWIDTH] IN BLOOD BY AUTOMATED COUNT: 12.6 % (ref 11.6–15.1)
GFR SERPL CREATININE-BSD FRML MDRD: 110 ML/MIN/1.73SQ M
GLUCOSE SERPL-MCNC: 106 MG/DL (ref 65–140)
HCT VFR BLD AUTO: 38.6 % (ref 36.5–49.3)
HGB BLD-MCNC: 13.2 G/DL (ref 12–17)
LIPASE SERPL-CCNC: 71 U/L (ref 11–82)
MAGNESIUM SERPL-MCNC: 1.7 MG/DL (ref 1.9–2.7)
MCH RBC QN AUTO: 34 PG (ref 26.8–34.3)
MCHC RBC AUTO-ENTMCNC: 34.2 G/DL (ref 31.4–37.4)
MCV RBC AUTO: 100 FL (ref 82–98)
PLATELET # BLD AUTO: 148 THOUSANDS/UL (ref 149–390)
PMV BLD AUTO: 8.9 FL (ref 8.9–12.7)
POTASSIUM SERPL-SCNC: 3.1 MMOL/L (ref 3.5–5.3)
PROT SERPL-MCNC: 5.9 G/DL (ref 6.4–8.4)
RBC # BLD AUTO: 3.88 MILLION/UL (ref 3.88–5.62)
SODIUM SERPL-SCNC: 138 MMOL/L (ref 135–147)
WBC # BLD AUTO: 4.05 THOUSAND/UL (ref 4.31–10.16)

## 2023-04-28 RX ORDER — METHYLPREDNISOLONE SODIUM SUCCINATE 125 MG/2ML
INJECTION, POWDER, LYOPHILIZED, FOR SOLUTION INTRAMUSCULAR; INTRAVENOUS
Status: COMPLETED
Start: 2023-04-28 | End: 2023-04-28

## 2023-04-28 RX ORDER — PHENOBARBITAL SODIUM 130 MG/ML
130 INJECTION INTRAMUSCULAR ONCE
Status: COMPLETED | OUTPATIENT
Start: 2023-04-28 | End: 2023-04-28

## 2023-04-28 RX ORDER — METHYLPREDNISOLONE SODIUM SUCCINATE 125 MG/2ML
125 INJECTION, POWDER, LYOPHILIZED, FOR SOLUTION INTRAMUSCULAR; INTRAVENOUS DAILY
Status: DISCONTINUED | OUTPATIENT
Start: 2023-04-28 | End: 2023-04-29

## 2023-04-28 RX ORDER — IPRATROPIUM BROMIDE AND ALBUTEROL SULFATE 2.5; .5 MG/3ML; MG/3ML
3 SOLUTION RESPIRATORY (INHALATION) ONCE
Status: COMPLETED | OUTPATIENT
Start: 2023-04-28 | End: 2023-04-28

## 2023-04-28 RX ORDER — PHENOBARBITAL 64.8 MG/1
64.8 TABLET ORAL ONCE
Status: COMPLETED | OUTPATIENT
Start: 2023-04-28 | End: 2023-04-28

## 2023-04-28 RX ORDER — POTASSIUM CHLORIDE 20 MEQ/1
40 TABLET, EXTENDED RELEASE ORAL ONCE
Status: COMPLETED | OUTPATIENT
Start: 2023-04-28 | End: 2023-04-28

## 2023-04-28 RX ORDER — MAGNESIUM SULFATE HEPTAHYDRATE 40 MG/ML
2 INJECTION, SOLUTION INTRAVENOUS ONCE
Status: COMPLETED | OUTPATIENT
Start: 2023-04-28 | End: 2023-04-28

## 2023-04-28 RX ORDER — POTASSIUM CHLORIDE 14.9 MG/ML
20 INJECTION INTRAVENOUS ONCE
Status: COMPLETED | OUTPATIENT
Start: 2023-04-28 | End: 2023-04-28

## 2023-04-28 RX ADMIN — TRAZODONE HYDROCHLORIDE 50 MG: 50 TABLET ORAL at 00:06

## 2023-04-28 RX ADMIN — FOLIC ACID 1 MG: 1 TABLET ORAL at 08:33

## 2023-04-28 RX ADMIN — NICOTINE POLACRILEX 2 MG: 2 GUM, CHEWING BUCCAL at 10:46

## 2023-04-28 RX ADMIN — MULTIPLE VITAMINS W/ MINERALS TAB 1 TABLET: TAB ORAL at 08:32

## 2023-04-28 RX ADMIN — AMLODIPINE BESYLATE 10 MG: 10 TABLET ORAL at 08:33

## 2023-04-28 RX ADMIN — PHENOBARBITAL SODIUM 130 MG: 130 INJECTION INTRAMUSCULAR at 10:17

## 2023-04-28 RX ADMIN — GUAIFENESIN 600 MG: 600 TABLET, EXTENDED RELEASE ORAL at 21:23

## 2023-04-28 RX ADMIN — THIAMINE HCL TAB 100 MG 100 MG: 100 TAB at 08:33

## 2023-04-28 RX ADMIN — LOSARTAN POTASSIUM 25 MG: 25 TABLET, FILM COATED ORAL at 08:33

## 2023-04-28 RX ADMIN — PHENOBARBITAL SODIUM 130 MG: 130 INJECTION INTRAMUSCULAR at 08:53

## 2023-04-28 RX ADMIN — PHENOBARBITAL 64.8 MG: 64.8 TABLET ORAL at 14:15

## 2023-04-28 RX ADMIN — NICOTINE 1 PATCH: 21 PATCH, EXTENDED RELEASE TRANSDERMAL at 08:32

## 2023-04-28 RX ADMIN — MAGNESIUM SULFATE 2 G: 2 INJECTION INTRAVENOUS at 08:33

## 2023-04-28 RX ADMIN — TRAZODONE HYDROCHLORIDE 50 MG: 50 TABLET ORAL at 21:29

## 2023-04-28 RX ADMIN — ALBUTEROL SULFATE 2 PUFF: 90 AEROSOL, METERED RESPIRATORY (INHALATION) at 07:50

## 2023-04-28 RX ADMIN — NICOTINE POLACRILEX 2 MG: 2 GUM, CHEWING BUCCAL at 17:49

## 2023-04-28 RX ADMIN — POTASSIUM CHLORIDE 40 MEQ: 1500 TABLET, EXTENDED RELEASE ORAL at 08:32

## 2023-04-28 RX ADMIN — GUAIFENESIN 600 MG: 600 TABLET, EXTENDED RELEASE ORAL at 08:33

## 2023-04-28 RX ADMIN — ENOXAPARIN SODIUM 40 MG: 40 INJECTION SUBCUTANEOUS at 08:32

## 2023-04-28 RX ADMIN — METHYLPREDNISOLONE SODIUM SUCCINATE 125 MG: 125 INJECTION, POWDER, FOR SOLUTION INTRAMUSCULAR; INTRAVENOUS at 14:04

## 2023-04-28 RX ADMIN — POTASSIUM CHLORIDE 20 MEQ: 14.9 INJECTION, SOLUTION INTRAVENOUS at 08:33

## 2023-04-28 RX ADMIN — PHENOBARBITAL SODIUM 130 MG: 130 INJECTION INTRAMUSCULAR; INTRAVENOUS at 15:35

## 2023-04-28 RX ADMIN — IPRATROPIUM BROMIDE AND ALBUTEROL SULFATE 3 ML: 2.5; .5 SOLUTION RESPIRATORY (INHALATION) at 10:55

## 2023-04-28 RX ADMIN — GUAIFENESIN 600 MG: 600 TABLET, EXTENDED RELEASE ORAL at 00:06

## 2023-04-28 RX ADMIN — PHENOBARBITAL SODIUM 130 MG: 130 INJECTION INTRAMUSCULAR at 07:50

## 2023-04-28 RX ADMIN — NICOTINE POLACRILEX 2 MG: 2 GUM, CHEWING BUCCAL at 15:14

## 2023-04-28 RX ADMIN — METHYLPREDNISOLONE SODIUM SUCCINATE 125 MG: 125 INJECTION, POWDER, LYOPHILIZED, FOR SOLUTION INTRAMUSCULAR; INTRAVENOUS at 14:04

## 2023-04-28 RX ADMIN — PHENOBARBITAL SODIUM 130 MG: 130 INJECTION INTRAMUSCULAR at 21:29

## 2023-04-28 NOTE — CASE MANAGEMENT
Cm contacted pt's Rik Celis, by phone to discuss discharge plan  Lucas Ybarra stated she was in support of pt being referred to [address] [place] [address] for aftercare ISABELLA OP support  Cm met with pt and pt also in agreement with this plan  Pt signed BETSY for [address] [address] 72  Cm discussed with pt the importance of support to prevent relapse and pt was provided with a 12 step meeting list for local AA groups  Cm contacted [address] [place] [address] and pt was scheduled an intake appointment for 5/1/2023

## 2023-04-28 NOTE — DISCHARGE INSTR - OTHER ORDERS
Drug and Alcohol Resources in [facility]    If you have health insurance, including medical assistance, there should be a phone number on your insurance card that you can call to find out how to access services  The card may say, For Imani Saldivarnn Raquel or For Drug and Alcohol Services or For Substance Abuse Services call the number provided  Or contact [phone]    [facility] ([facility])  [facility] provides care management for adults with Opioid Use Disorder  [facility] has a team of care managers who will help individuals get into treatment, coordinate behavioral and physical health care, and provide recovery support and guidance  Care managers will also provide information about Medication-Assisted Treatment  Contact [phone]    [facility] Drug and Alcohol Administration [phone] For additional information, contact the Department of Human Services Information and Referral Unit at [phone] between the hours of 8:30 a m  and 4:30 p m , Monday through Friday  Recovery Centers  These centers offer a safe, sober environment to those in recovery  A variety of programming including 12-Step Meetings, Kierra Hue, Life Skills Workshops, etc  is offered at each location  Recovery Centers  These centers offer a safe, sober environment to those in recovery  A variety of programming including 12-Step Meetings, Kierra Hue, Life Skills Workshops, etc  is offered at each location  [address], [address] [place] [address]  [phone]  [email or web address]  org [address]  [place], [address] [facility]  [phone]  obplov-fo-kguz  [address], [address] [facility]  [phone]   [address] [place] [address]  [phone]  www  [email or web address], [address] [place] [address]  [phone]  [facility]  KAROLYN HALL [place] FOR REHABILITATION is [phone] [facility], [place]  Open NatemaryanaAlejandraving, Thursday from 1pm-5pm and Friday, Saturday from 11am-3pm    [employer]  Confidential free help, from public health agencies, to find substance use treatment and information  [phone]    Link for Zoom Codes for Virtual 12 step Meetings Darwin dsila   aspx    AA [facility]  If you feel you have a problem with alcohol, or if you simply want to know more about AA, call our 24-hour hotline at: [address]: [address] Meetings can be found at [email or web address] [email or web address]-wood [email or web address]/  NAYELI Meeting list [email or web address]

## 2023-04-28 NOTE — ASSESSMENT & PLAN NOTE
Recent Labs     04/27/23  1210 04/28/23  0538    148*     · No acute evidence of bleeding  · Likely myelosuppression due to chronic alcohol use   · Continue monitoring

## 2023-04-28 NOTE — ASSESSMENT & PLAN NOTE
Recent Labs     04/27/23  1210 04/28/23  0538   K 3 7 3 1*   MG 1 6* 1 7*     Plan:  · K repletion: Kdur 40 mEQ and 20 mg IV  · Continue to monitor with CMP and replete as necessary

## 2023-04-28 NOTE — UTILIZATION REVIEW
Initial Clinical Review    Pt presented to [address] [place] [address] for its Level IV medically managed intensive inpatient detox unit  Admission: Date/Time/Statement:   Admission Orders (From admission, onward)     Ordered        04/27/23 1309  INPATIENT ADMISSION  Once                      Orders Placed This Encounter   Procedures    INPATIENT ADMISSION     Standing Status:   Standing     Number of Occurrences:   1     Order Specific Question:   Level of Care     Answer:   Med Surg [16]     Order Specific Question:   Estimated length of stay     Answer:   More than 2 Midnights     Order Specific Question:   Certification     Answer:   I certify that inpatient services are medically necessary for this patient for a duration of greater than two midnights  See H&P and MD Progress Notes for additional information about the patient's course of treatment  ED Arrival Information     Expected   -    Arrival   4/27/2023 11:14    Acuity   Urgent            Means of arrival   Walk-In    Escorted by   [facility]    Service   Emergency Medicine    Admission type   Emergency            Arrival complaint   alcohol withdraw           Chief Complaint   Patient presents with    Alcohol Intoxication     Sent by PCP - drinks every day 10-12 shots of vodka and a 4-6 beers a night         Initial Presentation: 43 y o  male who presented to medical detox  Inpatient admission for evaluation and treatment of alcohol withdrawal syndrome  Presented w/ need for detox from alcohol  Serum ETOH: 160  Reports over 10 shots of liquor and 4-6 beers daily, last drink on 4/26 evening  Has no prior rehab treatment for withdrawal  Reports no hx of withdrawal seizures  On exam, anxiety, insomnia, tremors, HTN, wheezing, rhonchi, tachycardic  SEWS 11  Plan: SEWS monitoring w/ phenobarbital management, IV thiamine/folic acid supplement, IVF, telemetry, continuous pulse ox, continue PTA meds, trend labs, replete electrolytes as needed         Date: 04/28/23       Day 2: Pt reports withdrawal symptoms of anxiety and shakes  Interested in naltrexone  On exam, tachycardic, wheezing, tremors, anxious  SEWS 8  Plan: continue SEWS monitoring w/ phenobarbital management, PO thiamine/folic acid supplement, telemetry, continuous pulse ox, continue PTA meds, trend labs, replete electrolytes as needed       ED Triage Vitals   Temperature Pulse Respirations Blood Pressure SpO2   04/27/23 1136 04/27/23 1136 04/27/23 1136 04/27/23 1136 04/27/23 1136   97 7 °F (36 5 °C) 98 18 (!) 171/103 97 %      Temp Source Heart Rate Source Patient Position - Orthostatic VS BP Location FiO2 (%)   04/27/23 1136 04/27/23 1136 04/27/23 1136 04/27/23 1136 --   Tympanic Monitor Sitting Left arm       Pain Score       04/27/23 1338       No Pain          Wt Readings from Last 1 Encounters:   04/27/23 69 kg (152 lb 1 9 oz)     Vital Signs:   Date/Time Temp Pulse Resp BP MAP (mmHg) SpO2 O2 Device   04/28/23 1004 97 7 °F (36 5 °C) 87 16 139/101 Abnormal  -- 98 % None (Room air)   04/28/23 0844 -- 92 18 155/103 Abnormal  -- 97 % None (Room air)   04/28/23 0700 97 6 °F (36 4 °C) 84 16 139/97 -- 96 % None (Room air)   04/28/23 0540 98 4 °F (36 9 °C) 84 18 151/95 113 97 % None (Room air)   04/28/23 0000 -- 82 -- -- -- 92 % None (Room air)   04/27/23 2021 98 9 °F (37 2 °C) 92 18 156/111 Abnormal  -- 95 % None (Room air)   04/27/23 1748 99 8 °F (37 7 °C) 98 18 151/96 -- 94 % None (Room air)   04/27/23 1614 99 5 °F (37 5 °C) 96 18 160/109 Abnormal  -- 93 % None (Room air)   04/27/23 1402 99 1 °F (37 3 °C) 105 18 166/115 Abnormal  -- 97 % None (Room air)   04/27/23 1338 -- 98 16 154/106 Abnormal  -- 96 % None (Room air)   04/27/23 1142 -- 98 -- 171/103 Abnormal  -- -- --     CIWA-Ar:   04/27/23 1142 04/27/23 1136   /103 Abnormal   -/103 Abnormal   -KM   Pulse 98  -LBA 98  -KM   Nausea and Vomiting 0  -LBA 0  -DD   Tactile Disturbances 0  -LBA 0  -DD   Tremor 3  -LBA 3  -DD   Auditory Disturbances 0  -LBA 0  -DD   Paroxysmal Sweats 1  -LBA 0  -DD   Visual Disturbances 0  -LBA 0  -DD   Anxiety 5  -LBA 0  -DD   Headache, Fullness in Head 0  -LBA 0  -DD   Agitation 0  -LBA 0  -DD   Orientation and Clouding of Sensorium 0  -LBA 0  -DD   CIWA-Ar Total 9  -LBA 3  -DD       Severity of Ethanol Withdrawal Scale (SEWS):   Row Name 04/28/23 1000 04/28/23 0846 04/28/23 0733 04/28/23 0000 04/27/23 2100   Severity of Ethanol Withdrawal Scale (SEWS)   ANXIETY: Do you feel that something bad is about to happen to you right now? 3  -LL 3  -LL 3  -LL 0  -NR 0  -NR   NAUSEA and DRY HEAVES or VOMITING? 0  -LL 0  -LL 0  -LL 0  -NR 0  -NR   SWEATING: (includes moist palms, sweating now)? Score 0 or 2 0  -LL 0  -LL 0  -LL 0  -NR 0  -NR   TREMOR: with arms extended eyes closed? 2  -LL 2  -LL 2  -LL 0  -NR 0  -NR   AGITATION: Fidgety, restless, pacing? 0  -LL 0  -LL 0  -LL 0  -NR 0  -NR   DISORIENTATION: 0  -LL 0  -LL 0  -LL 0  -NR 0  -NR   HALLUCINATIONS: 0  -LL 0  -LL 0  -LL 0  -NR 0  -NR   VITAL SIGNS: ANY (Pulse >104, Diastolic BP >89, Temp >43 4) 3  -LL 3  -LL 3  -LL 0  -NR 0  -NR   SEWS Total Score 8  -LL 8  -LL 8  -LL 0  -NR 0  -NR   Kothari Agitation Sedation Scale (RASS)   Kothari Agitation Sedation Scale (RASS) 0  -LL 0  -LL 0  -LL -- --   Row Name 04/27/23 2021 04/27/23 1749 04/27/23 1614 04/27/23 1407    Severity of Ethanol Withdrawal Scale (SEWS)   ANXIETY: Do you feel that something bad is about to happen to you right now? 3  -NR 3  -LB 3  -LB 3  -LB    NAUSEA and DRY HEAVES or VOMITING? 0  -NR 0  -LB 0  -LB 0  -LB    SWEATING: (includes moist palms, sweating now)? Score 0 or 2 0  -NR 0  -LB 0  -LB 0  -LB    TREMOR: with arms extended eyes closed? 2  -NR 2  -LB 2  -LB 2  -LB    AGITATION: Fidgety, restless, pacing?  3  -NR 3  -LB 0  -LB 3  -LB    DISORIENTATION: 0  -NR 0  -LB 0  -LB 0  -LB    HALLUCINATIONS: 0  -NR 0  -LB 0  -LB 0  -LB    VITAL SIGNS: ANY (Pulse >417, Diastolic BP >27, Temp >59 0) 3  -NR 3  -LB 3  -LB 3  -LB    SEWS Total Score 11  -NR 11  -LB 8  -LB 11  -LB          Pertinent Labs/Diagnostic Test Results:   Results from last 7 days   Lab Units 04/28/23  0538 04/27/23  1210   WBC Thousand/uL 4 05* 3 89*   HEMOGLOBIN g/dL 13 2 14 2   HEMATOCRIT % 38 6 40 8   PLATELETS Thousands/uL 148* 174   NEUTROS ABS Thousands/µL  --  2 45         Results from last 7 days   Lab Units 04/28/23  0538 04/27/23  1210   SODIUM mmol/L 138 137   POTASSIUM mmol/L 3 1* 3 7   CHLORIDE mmol/L 103 100   CO2 mmol/L 27 22   ANION GAP mmol/L 8 15*   BUN mg/dL 4* 9   CREATININE mg/dL 0 79 0 80   EGFR ml/min/1 73sq m 110 110   CALCIUM mg/dL 8 1* 8 9   MAGNESIUM mg/dL 1 7* 1 6*     Results from last 7 days   Lab Units 04/28/23  0538 04/27/23  1210   AST U/L 95* 145*   ALT U/L 51 61*   ALK PHOS U/L 50 48   TOTAL PROTEIN g/dL 5 9* 7 0   ALBUMIN g/dL 3 8 4 5   TOTAL BILIRUBIN mg/dL 1 12* 0 62         Results from last 7 days   Lab Units 04/28/23  0538 04/27/23  1210   GLUCOSE RANDOM mg/dL 106 142*     Results from last 7 days   Lab Units 04/27/23  1200   AMPH/METH  Negative   BARBITURATE UR  Negative   BENZODIAZEPINE UR  Negative   COCAINE UR  Negative   METHADONE URINE  Negative   OPIATE UR  Negative   PCP UR  Negative   THC UR  Negative     Results from last 7 days   Lab Units 04/27/23  1334 04/27/23  1210   ETHANOL LVL mg/dL  --  160*   ACETAMINOPHEN LVL ug/mL  --  <86*   SALICYLATE LVL mg/dL <5  --        ED Treatment:   Medication Administration from 04/27/2023 1114 to 04/27/2023 1355       Date/Time Order Dose Route Action     32/66/9403 7400 EDT folic acid 1 mg in sodium chloride 0 9 % 50 mL IVPB 1 mg Intravenous New Bag     04/27/2023 1210 EDT sodium chloride 0 9 % bolus 1,000 mL 1,000 mL Intravenous New Bag     04/27/2023 1329 EDT thiamine (VITAMIN B1) 400 mg in sodium chloride 0 9 % 50 mL IVPB 400 mg Intravenous New Bag        Past Medical History:   Diagnosis Date    GERD (gastroesophageal reflux disease)     Hypertension     Rectal bleed      Present on Admission:   Alcohol withdrawal syndrome with complication (HCC)   Alcohol use disorder, severe, dependence ([email or web address] 75 )   Transaminitis   Hypomagnesemia   Primary hypertension   Mild intermittent asthma without complication   Tobacco use disorder      Admitting Diagnosis: Alcohol withdrawal ([place] [address] ) [F10 939]  Alcohol intoxication ([place] [address] ) [F10 929]  Alcohol abuse [W41 78]  Alcoholic intoxication without complication ([place] [address] ) [O92 861]  Age/Sex: 43 y o  male  Admission Orders:   Regular Diet  SCDs  Fall & Seizure Precautions  SEWS monitoring  Telemetry & Continuous Pulse Ox  Scheduled Medications:  amLODIPine, 10 mg, Oral, Daily  enoxaparin, 40 mg, Subcutaneous, Daily  folic acid, 1 mg, Oral, Daily  guaiFENesin, 600 mg, Oral, Q12H [facility]  ipratropium-albuterol, 3 mL, Nebulization, Once  losartan, 25 mg, Oral, Daily  methylPREDNISolone sodium succinate, 125 mg, Intravenous, Daily  multivitamin-minerals, 1 tablet, Oral, Daily  nicotine, 1 patch, Transdermal, Daily  thiamine, 100 mg, Oral, Daily    Continuous IV Infusions:    dextrose 5 % and sodium chloride 0 9 %, 100 mL/hr, Intravenous, Continuous; Start: 04/27/23 1400 End: 04/28/23 0827    PRN Meds:  acetaminophen, 650 mg, Oral, Q6H PRN  albuterol, 2 puff, Inhalation, Q4H PRN; 4/28 x1  magnesium sulfate, 2 g, Intravenous; 4/27 x1, 4/28 x1  nicotine polacrilex, 2 mg, Oral, Q2H PRN; 4/28 x1  ondansetron, 4 mg, Intravenous, Q6H PRN  potassium chloride, 40 mEq, Oral; 4/28 x1  potassium chloride, 20 mEq, Intravenous; 4/28 x1  traZODone, 50 mg, Oral, HS PRN; 4/28 x1  diazepam, 10 mg, Intravenous; 4/27 x1  phenobarbital, 650 mg, Intravenous; 4/27x1  phenobarbital, 130 mg, Intravenous; 4/27 x3, 4/28 x3        IP CONSULT TO CASE MANAGEMENT    Network Utilization Review Department  ATTENTION: Please call with any questions or concerns to [phone] and carefully listen to the prompts so that you are directed to the right person   All voicemails are confidential   [facility] all requests for admission clinical reviews, approved or denied determinations and any other requests to dedicated fax number below belonging to the campus where the patient is receiving treatment   List of dedicated fax numbers for the Facilities:  [address] DENIALS (Administrative/Medical Necessity) [phone]   [address] (Maternity/NICU/Pediatrics) [phone]   [address] 40 [address] [place] [address]  [phone]   [address] [facility] [place] [address] Luca Dylane Stacinhjennifer [facility] 28 Camila [address] [facility] [phone]

## 2023-04-28 NOTE — ASSESSMENT & PLAN NOTE
Recent Labs     04/27/23  1210 04/28/23  0538   WBC 3 89* 4 05*   · Low WBC on admission  · Likely myelosuppression due to chronic alcohol use   · No signs of active infection  · Continue monitoring CBC

## 2023-04-28 NOTE — PROGRESS NOTES
04/28/23 1002   Referral Data   Referral Source Patient   Referral Name [facility] ED   Referral Reason Drug/Alcohol [place] 52   Readmission Root Cause   30 Day Readmission No   Patient Information   Mental Status Alert   Primary Caregiver Self   Support System Immediate family   Taoist/Cultural Requests n/a   Legal Information   Legal Issues pt denies any current but reports hx of 1 DUI   Activities of Daily Living Prior to Admission   Functional Status Independent   Assistive Device No device needed   Living Arrangement House;Lives with someone   Ambulation Independent   Access to Firearms   Access to Firearms No  (pt denies)   Income [address] of Transportation   Means of Transport to Appts: Drives Self        09/17/48 1003   Substance Abuse Addendum Details   History of Withdrawal Symptoms Other withdrawal symptoms (specify in comment)  (tremors)   Medical Complications Hypertension   Sober Supports family   Present Treatment detox   Substance Abuse Treatment Hx Denies past history   Stage of Change   Stage of Change Contemplation     Additional Substance Use Detail    Questions Responses   Problems Due to Past Use of Alcohol? Yes   Problems Due to Past Use of Substances? No   Substance Use Assessment Denies substance use within the past 12 months   Alcohol Use Frequency Daily   Alcohol Drink of Choice beer/alcohol   1st Use of Alcohol 15   Last Use of Alcohol & Amount 4/26/2023  3-4 beers and 10 shots of liquor   Longest Abstinence from Alcohol 2-3 days     Pt is a 43year old male who was admitted to detox for alcohol withdrawal   Pt had self presented at [facility] ED with spouse requesting detox  Pt's name, date of birth, home address, and telephone number were verified  Pt was informed of case management role and the purpose of the completion of intake with case management   Pt reports address listed is his mailing address and he and his family reside in [place], [place]  Pt presented as cooperative during intake  Pt reports he has been drinking daily for almost 30 years but states his alcohol use increased to more than 10 shots of liquor and 3-512 oz beers daily a few months ago after he was injured and was home for work due to shoulder surgery  Pt reports first use at age 13 and last use 4/26/2023 of 3-412 oz beers and  10 shots of liquor  Pt reports daily nicotine use of 1 PPD cigarette smoking  Cm offered referral to smoking/nicotine cessation program  Pt states no interest in stopping cigarette smoking  Pt denied any significant abstinence beyond 2-3 days and denied any previous attempts at treatment  Pt denied any previous 12 step meeting attendance  Pt reports current and only withdrawal symptoms tremors and denied any hx of withdrawal seizures, hallucinations, or Dt's  Pt reports very infrequent black out alcohol use  Pt reports a family hx of alcohol abuse  AUDIT: 41  PAWSS:3  Ethanol lvl in ED: 160  UDS: negative for all    Pt denied any hx of mental health diagnosis or treatment  Pt denied any SI or HI or hx of, pt denied any AH/VH, pt denied any hx of abuse or trauma or any recent losses  Pt denied any access to firearms and denied any knowledge of family hx of mental health needs  Pt has current chronic medical conditions of hypertension  Pt signed an BETSY for CARLOS EDUARDO ROBLERO, PCP  This office was contacted at [phone] and this office was informed of pt's admission  Pt has current health insurance of [employer], BETSY signed, and preferred pharmacy of [facility]  Pt denied any current legal issues but reports a hx of DUI about 1 5 years ago  Pt reports he is employed full time but is out on medical leave dur to shoulder injury  Pt reports completing an associates degree  Pt reports he has a car and a license   Pt denied any  service and denied any religous or cultural needs  Pt reports he resides with his spouse and  5 children  Pt states his spouse is caring for the children at this time  Pt signed an BETSY for [employer], [place], and she was contacted at [phone]  Anna Hageraldo indicated she was supportive of pt's seeking detox and stated she was in support of pt entering outpatient [facility] treatment upon discharge from detox  Pavithra and cm agreed to further discuss outpatient options  Pt denied any housing or food insecurities and reports his spouse can provide transportation home  Pt and Cm completed relapse prevention plan  Pt and Cm signed plan and pt received a copy of this plan  Pt  Was able to identify some triggers but stated he had no coping skills other than his alcohol use  Pt states he recognizes a need for therapy but cannot enter inpatient [facility] tx at this time due to the requirement by his employer that he attend 3x weekly PT  Pt and cm discussed outpatient treatment options and pt stated he would be interested in a therapist  Cm researched therapist near pt's home and was unable to reach any that accepted pt's insurance  Pt was in agreement with possible referral to [id number] if no other options were available  Pt's clinical presentation indicates pt struggles with any insight into relapse prevention skills due to this being pt's first attempt at abstinence  Pt's barrier appears to be pt's lack of knowledge and experience with recovery and lack of sufficient community support  Pt's goals for detox are to successfully complete medical withdrawal and to develop a discharge plan that includes learning relapse prevention skills  Pt presents in the contemplation stage of change

## 2023-04-28 NOTE — PROGRESS NOTES
[facility]  Progress Note  Name: Sincere Rizo  MRN: [id number]  Unit/Bed#: 5T DETOX 115-44 I Date of Admission: 4/27/2023   Date of Service: 4/28/2023 I Hospital Day: 1    Assessment/Plan   * Alcohol withdrawal syndrome with complication [facility])  Assessment & Plan  Patient with a history of chronic daily alcohol use  Last drink 4/26 PM  Serum alcohol 160 in the ED  Continue [facility] protocol for medical management of alcohol withdrawal  Current alcohol withdrawal signs/symptoms include anxiety, tremor, hypertension   Continue clinical monitoring of symptom progression   Total phenobarbital given 1300 mg thus far (last given 130 mg @0853)     Continuous pulse ox and telemetry monitoring    Alcohol use disorder, severe, dependence (HCC)  Assessment & Plan  Pt with a h/o chronic heavy alcohol use for without withdrawal related seizures   Drinks >10 shots of liquor, and couple of beers daily   Considering naltrexone prior to discharge  Withdrawal management as above  Daily thiamine/folic acid supplementation, and MVI  Consult case management for assistance with aftercare resources - pt interested in outpatient resources upon discharge       Transaminitis  Assessment & Plan  Lab Results   Component Value Date    ALT 51 04/28/2023    AST 95 (H) 04/28/2023    ALKPHOS 50 04/28/2023    TBILI 1 12 (H) 04/28/2023        · Elevated LFTs on admission as above  · Likely 2/2 chronic alcohol use/alcoholic liver disease  · Pt denies any abdominal pain   · Trending down with improvement, will discontinue IV fluids  · Continue routine monitoring of CMP   · Encourage alcohol cessation      Leukopenia  Assessment & Plan  Recent Labs     04/27/23  1210 04/28/23  0538   WBC 3 89* 4 05*   · Low WBC on admission  · Likely myelosuppression due to chronic alcohol use   · No signs of active infection  · Continue monitoring CBC    Thrombocytopenia ([facility] [address] )  Assessment & Plan  Recent Labs     04/27/23  1210 04/28/23  3732  148*     · No acute evidence of bleeding  · Likely myelosuppression due to chronic alcohol use   · Continue monitoring    Hypokalemia  Assessment & Plan  Recent Labs     04/27/23  1210 04/28/23  0538   K 3 7 3 1*   MG 1 6* 1 7*     Plan:  · K repletion: Kdur 40 mEQ and 20 mg IV  · Continue to monitor with CMP and replete as necessary      Tobacco use disorder  Assessment & Plan  · Daily tobacco use   · Offered NRT  · Encourage cessation     Mild intermittent asthma without complication  Assessment & Plan  · History of mild intermittent asthma that he reports is usually exacerbated with smoking  · Currently endorses productive cough with clear sputum and wheezing  · On exam inspiratory/expiratory wheeze throughout left lung fields   · PRN albuterol inhaler  Follow-up on CXR read  · Continue to monitor   · Encourage cessation     Primary hypertension  Assessment & Plan  · Home regimen of losartan 25mg QD, amlodipine 10mg QD  · Will continue home regimen   · Routine monitoring of VS    Hypomagnesemia  Assessment & Plan  Recent Labs     04/27/23  1210 04/28/23  0538   K 3 7 3 1*   MG 1 6* 1 7*   Mag 1 6 on admission  Supplemented   Recheck Mag level in the AM and optimize electrolytes          VTE Pharmacologic Prophylaxis:   Pharmacologic: Enoxaparin (Lovenox)  Mechanical VTE Prophylaxis in Place: yes    Code Status: Level 1 - Full Code    Patient Centered Rounds: I have performed bedside rounds with nursing staff today  Discussions with Specialists or Other Care Team Provider: TIRSO and Dr Jf Johns    Education and Discussions with Family / Patient: I personally did not discuss with patient's family  Discussed treatment and plan with the patient and all questions were answered to the best of my ability  Time Spent for Care: 30 minutes  More than 50% of total time spent on counseling and coordination of care as described above      Current Length of Stay: 1 day(s)    Current Patient Status: Inpatient Certification Statement: The patient will continue to require additional inpatient hospital stay due to ongoing alcohol withdrawal management Discharge Plan: Anticipate discharge within 24-48 hours      Subjective:   Patient seen and examined at bedside  Patient complains of withdrawal symptoms including anxiety and shakes though notes improvement from yesterday  Tolerating PO intake and ambulating without issues  Denies any CP/HA/N/V/SOB and denies any SI/HI/AVH  States he has not been on any MAT in the past  Expresses interest in Naltrexone and would like to be connected with outpatient resources       Objective:     Clinical Opiate Withdrawal Scale  Pulse: 92    SEWS Total Score: 8 (2023  8:46 AM)        Last 24 Hours Medication List:   Current Facility-Administered Medications   Medication Dose Route Frequency Provider Last Rate    acetaminophen  650 mg Oral Q6H PRN BECCA Butler      albuterol  2 puff Inhalation Q4H PRN BECCA Butler      amLODIPine  10 mg Oral Daily Swathi PinedaBECCA nicole      enoxaparin  40 mg Subcutaneous Daily BECCA Butler      folic acid  1 mg Oral Daily BECCA Butler      guaiFENesin  600 mg Oral Q12H [facility] BECCA Mejia      losartan  25 mg Oral Daily [place], [place]      magnesium sulfate  2 g Intravenous Once Dum[place] miles, DO 2 g (23 6120)    multivitamin-minerals  1 tablet Oral Daily BECCA Butler      nicotine  1 patch Transdermal Daily BECCA Butler      ondansetron  4 mg Intravenous Q6H PRN BECCA Butler      potassium chloride  20 mEq Intravenous Once [place] miles, DO 20 mEq (23 5747)    thiamine  100 mg Oral Daily Swathi PinedaBECCA nicole      traZODone  50 mg Oral HS PRN Swathi Pineda, CRNP           Vitals:   Temp (24hrs), Av 7 °F (37 1 °C), Min:97 6 °F (36 4 °C), Max:99 8 °F (37 7 °C)    Temp:  [97 6 °F (36 4 °C)-99 8 °F (37 7 °C)] 97 6 °F (36 4 °C)  HR: [] 92  Resp:  [16-18] 18  BP: (139-171)/() 155/103  SpO2:  [92 %-97 %] 97 %  Body mass index is 23 82 kg/m²  Input and Output Summary (last 24 hours): Intake/Output Summary (Last 24 hours) at 4/28/2023 0950  Last data filed at 4/27/2023 1249  Gross per 24 hour   Intake 50 ml   Output --   Net 50 ml       Physical Exam:   Physical Exam  Vitals and nursing note reviewed  Constitutional:       General: He is awake  He is not in acute distress  Appearance: Normal appearance  He is normal weight  He is not diaphoretic  HENT:      Head: Normocephalic and atraumatic  Mouth/Throat:      Pharynx: Oropharynx is clear  Eyes:      Conjunctiva/sclera: Conjunctivae normal    Cardiovascular:      Rate and Rhythm: Regular rhythm  Tachycardia present  Heart sounds: Normal heart sounds  Pulmonary:      Effort: No accessory muscle usage or respiratory distress  Breath sounds: Wheezing (diffuse inspiratory wheezing) present  Chest:      Chest wall: No tenderness  Abdominal:      General: Bowel sounds are normal  There is no distension  Palpations: Abdomen is soft  Tenderness: There is no abdominal tenderness  Musculoskeletal:      Right lower leg: No edema  Left lower leg: No edema  Skin:     General: Skin is warm  Neurological:      Mental Status: He is alert and oriented to person, place, and time  Mental status is at baseline  Motor: Tremor (bilateral UE hand tremor) present  Psychiatric:         Mood and Affect: Mood is anxious  Behavior: Behavior is cooperative  Thought Content: Thought content does not include homicidal or suicidal ideation           Additional Data:     Labs:   Results from last 7 days   Lab Units 04/28/23  0538 04/27/23  1210   WBC Thousand/uL 4 05* 3 89*   HEMOGLOBIN g/dL 13 2 14 2   HEMATOCRIT % 38 6 40 8   PLATELETS Thousands/uL 148* 174   NEUTROS PCT %  --  61   LYMPHS PCT %  --  28   MONOS PCT %  --  8   EOS PCT % --  1      Results from last 7 days   Lab Units 04/28/23  0538   SODIUM mmol/L 138   POTASSIUM mmol/L 3 1*   CHLORIDE mmol/L 103   CO2 mmol/L 27   BUN mg/dL 4*   CREATININE mg/dL 0 79   ANION GAP mmol/L 8   CALCIUM mg/dL 8 1*   ALBUMIN g/dL 3 8   TOTAL BILIRUBIN mg/dL 1 12*   ALK PHOS U/L 50   ALT U/L 51   AST U/L 95*   GLUCOSE RANDOM mg/dL 106                              * I Have Reviewed All Lab Data Listed Above  * Additional Pertinent Lab Tests Reviewed: All Labs Within Last 24 Hours Reviewed      Imaging Studies: I have personally reviewed pertinent reports  Recent Cultures (last 7 days): Today, Patient Was Seen By: Donna Alvarez DO    ** Please Note: Dictation voice to text software may have been used in the creation of this document   **

## 2023-04-28 NOTE — PLAN OF CARE
Problem: Potential for Falls  Goal: Patient will remain free of falls  Description: INTERVENTIONS:  - Educate patient/family on patient safety including physical limitations  - Instruct patient to call for assistance with activity   - Consult OT/PT to assist with strengthening/mobility   - Keep Call bell within reach  - Keep bed low and locked with side rails adjusted as appropriate  - Keep care items and personal belongings within reach  - Initiate and maintain comfort rounds      - Apply yellow socks and bracelet for high fall risk patients  - Consider moving patient to room near nurses station  Outcome: Progressing

## 2023-04-29 PROBLEM — D69.6 THROMBOCYTOPENIA (HCC): Status: RESOLVED | Noted: 2023-04-28 | Resolved: 2023-04-29

## 2023-04-29 PROBLEM — J45.21 MILD INTERMITTENT ASTHMA WITH ACUTE EXACERBATION: Status: ACTIVE | Noted: 2023-04-27

## 2023-04-29 PROBLEM — D75.89 MACROCYTOSIS WITHOUT ANEMIA: Status: ACTIVE | Noted: 2023-04-29

## 2023-04-29 PROBLEM — E83.42 HYPOMAGNESEMIA: Status: RESOLVED | Noted: 2023-04-27 | Resolved: 2023-04-29

## 2023-04-29 PROBLEM — E87.6 HYPOKALEMIA: Status: RESOLVED | Noted: 2023-04-28 | Resolved: 2023-04-29

## 2023-04-29 PROBLEM — D72.819 LEUKOPENIA: Status: RESOLVED | Noted: 2023-04-28 | Resolved: 2023-04-29

## 2023-04-29 PROBLEM — F10.939 ALCOHOL WITHDRAWAL SYNDROME WITH COMPLICATION (HCC): Status: RESOLVED | Noted: 2023-04-27 | Resolved: 2023-04-29

## 2023-04-29 LAB
ALBUMIN SERPL BCP-MCNC: 4.2 G/DL (ref 3.5–5)
ALP SERPL-CCNC: 66 U/L (ref 34–104)
ALT SERPL W P-5'-P-CCNC: 43 U/L (ref 7–52)
ANION GAP SERPL CALCULATED.3IONS-SCNC: 9 MMOL/L (ref 4–13)
AST SERPL W P-5'-P-CCNC: 61 U/L (ref 13–39)
BASOPHILS # BLD AUTO: 0.02 THOUSANDS/ΜL (ref 0–0.1)
BASOPHILS NFR BLD AUTO: 0 % (ref 0–1)
BILIRUB SERPL-MCNC: 0.73 MG/DL (ref 0.2–1)
BUN SERPL-MCNC: 6 MG/DL (ref 5–25)
CALCIUM SERPL-MCNC: 9.2 MG/DL (ref 8.4–10.2)
CHLORIDE SERPL-SCNC: 102 MMOL/L (ref 96–108)
CO2 SERPL-SCNC: 25 MMOL/L (ref 21–32)
CREAT SERPL-MCNC: 0.81 MG/DL (ref 0.6–1.3)
EOSINOPHIL # BLD AUTO: 0.06 THOUSAND/ΜL (ref 0–0.61)
EOSINOPHIL NFR BLD AUTO: 1 % (ref 0–6)
ERYTHROCYTE [DISTWIDTH] IN BLOOD BY AUTOMATED COUNT: 12.2 % (ref 11.6–15.1)
GFR SERPL CREATININE-BSD FRML MDRD: 109 ML/MIN/1.73SQ M
GLUCOSE SERPL-MCNC: 112 MG/DL (ref 65–140)
HCT VFR BLD AUTO: 39.9 % (ref 36.5–49.3)
HGB BLD-MCNC: 13.8 G/DL (ref 12–17)
IMM GRANULOCYTES # BLD AUTO: 0.03 THOUSAND/UL (ref 0–0.2)
IMM GRANULOCYTES NFR BLD AUTO: 1 % (ref 0–2)
LYMPHOCYTES # BLD AUTO: 1.28 THOUSANDS/ΜL (ref 0.6–4.47)
LYMPHOCYTES NFR BLD AUTO: 23 % (ref 14–44)
MAGNESIUM SERPL-MCNC: 2.2 MG/DL (ref 1.9–2.7)
MCH RBC QN AUTO: 34.3 PG (ref 26.8–34.3)
MCHC RBC AUTO-ENTMCNC: 34.6 G/DL (ref 31.4–37.4)
MCV RBC AUTO: 99 FL (ref 82–98)
MONOCYTES # BLD AUTO: 0.5 THOUSAND/ΜL (ref 0.17–1.22)
MONOCYTES NFR BLD AUTO: 9 % (ref 4–12)
NEUTROPHILS # BLD AUTO: 3.59 THOUSANDS/ΜL (ref 1.85–7.62)
NEUTS SEG NFR BLD AUTO: 66 % (ref 43–75)
NRBC BLD AUTO-RTO: 0 /100 WBCS
PLATELET # BLD AUTO: 191 THOUSANDS/UL (ref 149–390)
PMV BLD AUTO: 9.3 FL (ref 8.9–12.7)
POTASSIUM SERPL-SCNC: 3.7 MMOL/L (ref 3.5–5.3)
PROT SERPL-MCNC: 6.5 G/DL (ref 6.4–8.4)
RBC # BLD AUTO: 4.02 MILLION/UL (ref 3.88–5.62)
SODIUM SERPL-SCNC: 136 MMOL/L (ref 135–147)
WBC # BLD AUTO: 5.48 THOUSAND/UL (ref 4.31–10.16)

## 2023-04-29 RX ORDER — PREDNISONE 20 MG/1
40 TABLET ORAL DAILY
Status: DISCONTINUED | OUTPATIENT
Start: 2023-04-29 | End: 2023-04-30 | Stop reason: HOSPADM

## 2023-04-29 RX ORDER — NALTREXONE HYDROCHLORIDE 50 MG/1
50 TABLET, FILM COATED ORAL DAILY
Status: DISCONTINUED | OUTPATIENT
Start: 2023-04-29 | End: 2023-04-30 | Stop reason: HOSPADM

## 2023-04-29 RX ORDER — HYDROXYZINE 50 MG/1
50 TABLET, FILM COATED ORAL EVERY 6 HOURS PRN
Status: DISCONTINUED | OUTPATIENT
Start: 2023-04-29 | End: 2023-04-30 | Stop reason: HOSPADM

## 2023-04-29 RX ADMIN — ALBUTEROL SULFATE 2 PUFF: 90 AEROSOL, METERED RESPIRATORY (INHALATION) at 08:35

## 2023-04-29 RX ADMIN — GUAIFENESIN 600 MG: 600 TABLET, EXTENDED RELEASE ORAL at 21:06

## 2023-04-29 RX ADMIN — HYDROXYZINE HYDROCHLORIDE 50 MG: 50 TABLET, FILM COATED ORAL at 21:12

## 2023-04-29 RX ADMIN — MULTIPLE VITAMINS W/ MINERALS TAB 1 TABLET: TAB ORAL at 08:28

## 2023-04-29 RX ADMIN — NICOTINE POLACRILEX 2 MG: 2 GUM, CHEWING BUCCAL at 12:01

## 2023-04-29 RX ADMIN — NALTREXONE HYDROCHLORIDE 50 MG: 50 TABLET, FILM COATED ORAL at 14:27

## 2023-04-29 RX ADMIN — PREDNISONE 40 MG: 20 TABLET ORAL at 09:00

## 2023-04-29 RX ADMIN — TRAZODONE HYDROCHLORIDE 50 MG: 50 TABLET ORAL at 21:12

## 2023-04-29 RX ADMIN — HYDROXYZINE HYDROCHLORIDE 50 MG: 50 TABLET, FILM COATED ORAL at 15:30

## 2023-04-29 RX ADMIN — NICOTINE POLACRILEX 2 MG: 2 GUM, CHEWING BUCCAL at 15:30

## 2023-04-29 RX ADMIN — THIAMINE HCL TAB 100 MG 100 MG: 100 TAB at 08:28

## 2023-04-29 RX ADMIN — NICOTINE POLACRILEX 2 MG: 2 GUM, CHEWING BUCCAL at 08:28

## 2023-04-29 RX ADMIN — HYDROXYZINE HYDROCHLORIDE 50 MG: 50 TABLET, FILM COATED ORAL at 09:00

## 2023-04-29 RX ADMIN — GUAIFENESIN 600 MG: 600 TABLET, EXTENDED RELEASE ORAL at 08:28

## 2023-04-29 RX ADMIN — AMLODIPINE BESYLATE 10 MG: 10 TABLET ORAL at 08:28

## 2023-04-29 RX ADMIN — LOSARTAN POTASSIUM 25 MG: 25 TABLET, FILM COATED ORAL at 08:28

## 2023-04-29 RX ADMIN — NICOTINE 1 PATCH: 21 PATCH, EXTENDED RELEASE TRANSDERMAL at 08:30

## 2023-04-29 RX ADMIN — FOLIC ACID 1 MG: 1 TABLET ORAL at 08:28

## 2023-04-29 NOTE — ASSESSMENT & PLAN NOTE
Recent Labs     04/27/23  1210 04/28/23  0538 04/29/23  0552   K 3 7 3 1* 3 7   MG 1 6* 1 7* 2 2     · Improved following supplementation   · Routine monitoring of electrolytes and supplementation as indicated

## 2023-04-29 NOTE — DISCHARGE SUMMARY
[facility]  Discharge- Jayce Corado 1980, 43 y o  male MRN: [id number]  Unit/Bed#: 5T DETOX 650-43 Encounter: [id number]  Primary Care Provider: Vasyl Brewer MD   Date and time admitted to hospital: 4/27/2023 11:30 AM  [address] [facility], LEV[address] 4  Department of Medical Toxicology  Reason for Admission/Principal Problem: alcohol withdrawal, alcohol use disorder   Admitting provider: JOSE Lopez MD  4/27/2023 11:30 AM       Discharging Physician / Practitioner: Love Estevez PA-C  PCP: Vasyl Brewer MD  Admission Date:   Admission Orders (From admission, onward)     Ordered        04/27/23 1309  INPATIENT ADMISSION  Once                      Discharge Date: 04/30/23    Medical Problems     Resolved Problems  Date Reviewed: 4/29/2023          Resolved    * (Principal) Alcohol withdrawal syndrome with complication ([facility] [address] ) 4/61/0001     Resolved by  Spenser Prince PA-C    Hypomagnesemia 4/29/2023     Resolved by  Merrill Cooper PA-C    Thrombocytopenia ([facility] [address] ) 4/29/2023     Resolved by  Merrill Cooper PA-C    Leukopenia 4/29/2023     Resolved by  Merrill Cooper PA-C          * Alcohol withdrawal syndrome with complication (HCC)-resolved as of 4/29/2023  Assessment & Plan  Patient with a history of chronic daily alcohol use  Last drink 4/26 PM  Serum alcohol 160 in the ED (4/27/2023 1210)  SEWS protocol with symptom-triggered phenobarbital for medical management of alcohol withdrawal  Received 1754 8 mg total phenobarbital, last dose administered 4/25/2023 2129  Appears stable currently off phenobarbital- VSS (aside from underlying HTN), no tremors  Acute withdrawal resolved    Mild intermittent asthma without complication  Assessment & Plan  · History of mild intermittent asthma that he reports is usually exacerbated with smoking    · Noted to have asthma exacerbation early in admission with productive cough with clear "sputum and wheezing  · CXR 4/27/2023: \"No acute cardiopulmonary disease  \"  · Received Solu-Medrol 4/28  · Appears stable this AM- denies acute symptoms (chest pain, SOB, dyspnea)  · VSS- afebrile, SpO2 97%  · Mild expiratory wheezing noted   · Exacerbation appears resolved   · Will continue prednisone 40 mg daily for 5 days total (3 days remaining)  · Continue PRN albuterol inhaler    · Continue to monitor vitals, symptoms   · Encourage smoking cessation   · Recommend outpatient f/u PCP    Transaminitis  Assessment & Plan  Recent Labs     04/28/23  0538 04/29/23  0552   AST 95* 61*   ALT 51 43   ALKPHOS 50 66     · Elevated LFTs on admission as above  · Likely 2/2 chronic alcohol use/alcoholic liver disease  · Pt denies any abdominal pain   · LFTs improved   · Routine outpatient monitoring   · Encourage alcohol cessation    Macrocytosis without anemia  Assessment & Plan  Recent Labs     04/28/23  0538 04/29/23  0552   HGB 13 2 13 8   * 99*     · In setting of chronic alcohol use  · Continue thiamine, folic acid  · Encourage alcohol cessation    Hypokalemia  Assessment & Plan  Recent Labs     04/28/23  0538 04/29/23  0552 04/30/23  0456   K 3 1* 3 7 3 4*   MG 1 7* 2 2 2 1     · Mild hypokalemia this AM  · Administer supplementation  · Routine outpatient monitoring     Tobacco use disorder  Assessment & Plan  · Daily tobacco use   · Offered NRT  · Encourage cessation     Primary hypertension  Assessment & Plan  · Home regimen of losartan 25mg QD, amlodipine 10mg QD  · Notes he has been non-compliant with home meds   · Most recent /100  · Will continue home regimen - encourage compliance  · Educated regarding return precautions   · Recommend outpatient f/u PCP    Alcohol use disorder, severe, dependence ([facility] [address] )  Assessment & Plan  Pt with a h/o chronic heavy alcohol use  Drinks >10 shots of liquor, and couple of beers daily   Naltrexone started yesterday- continue   Withdrawal management as above  Daily " thiamine/folic acid supplementation, and MVI  Consult case management for assistance with aftercare resources - pt interested in outpatient resources upon discharge     Leukopenia-resolved as of 4/29/2023  Assessment & Plan  Recent Labs     04/28/23  0538 04/29/23  0552   WBC 4 05* 5 48   · improved and stable   · No signs of active infection  · Routine monitoring   · Encourage alcohol cessation     Thrombocytopenia (HCC)-resolved as of 4/29/2023  Assessment & Plan  Recent Labs     04/28/23  0538 04/29/23  0552   * 191     · Improved and stable   · Encourage alcohol cessation     Hypomagnesemia-resolved as of 4/29/2023  Assessment & Plan  Recent Labs     04/28/23  0538 04/29/23  0552 04/30/23  0456   K 3 1* 3 7 3 4*   MG 1 7* 2 2 2 1   resolved and stable       Consultations During Hospital Stay:  · Case management     Procedures Performed:   · None    Significant Findings / Test Results:   · Transaminitis - improved  · Hypokalemia - stable   · Hypomagnesmia - resolved   · Leukopenia - resolved   · Thrombocytopenia - stable    Incidental Findings:   · None     Test Results Pending at Discharge (will require follow up): · None      Outpatient Tests / Follow Up Requested:  · Recommend f/u with PCP within 1-2 weeks of discharge     Complications:  none    Reason for admission: alcohol withdrawal, alcohol use disorder     Hospital Course: Maggie Rey is a 43 y o  male patient PMH AUD, HTN, asthma who originally presented to the hospital on 4/27/2023 due to alcohol withdrawal  Patient initially presented to the [facility] ED 4/27/2023 requesting detoxification from alcohol  Patient was admitted to the [facility] medical detox unit under [facility] protocol for medically assisted alcohol withdrawal and received a total of 1755 mg phenobarbital without complication  Patient's alcohol withdrawal symptoms subsequently resolved, and he has remained without objective evidence of alcohol withdrawal at this time   Patient was noted "to have asthma exacerbation during admission, improved with steroids  During this hospitalization, patient was found to have hypokalemia, hypomagnesemia, and transaminitis, which improved with electrolyte supplementation and IVF hydration  Naltrexone started during admission  Case management was consulted for assistance with aftercare resources, and patient will be discharged home with OP resources  Please see above list of diagnoses and related plan for additional information  Condition at Discharge: good     Discharge Day Visit / Exam:     Subjective:  Patient seen and examined bedside this morning  Reports that he is feeling good today and is ready to go home  Currently denies headaches, lightheadedness/dizziness, coughing/sneezing/congestion/rhinorrhea, chest pain, SOB/dyspnea, abdominal pain, N/V/C, dysuria/hematuria, hallucinations  Vitals: Blood Pressure: 149/100 (04/30/23 0900)  Pulse: 78 (04/30/23 0800)  Temperature: 97 5 °F (36 4 °C) (04/30/23 0800)  Temp Source: Temporal (04/30/23 0800)  Respirations: 18 (04/30/23 0800)  Height: 5' 7\" (170 2 cm) (04/27/23 1402)  Weight - Scale: 69 kg (152 lb 1 9 oz) (04/27/23 1402)  SpO2: 97 % (04/30/23 0800)  Exam:   Physical Exam  Vitals and nursing note reviewed  Constitutional:       General: He is not in acute distress  Appearance: Normal appearance  He is well-developed and normal weight  He is not ill-appearing or diaphoretic  HENT:      Head: Normocephalic and atraumatic  Eyes:      General: No scleral icterus  Extraocular Movements: Extraocular movements intact  Conjunctiva/sclera: Conjunctivae normal       Pupils: Pupils are equal, round, and reactive to light  Cardiovascular:      Rate and Rhythm: Normal rate and regular rhythm  Pulses: Normal pulses  Heart sounds: Normal heart sounds  No murmur heard  No friction rub  No gallop  Pulmonary:      Effort: Pulmonary effort is normal  No respiratory distress        " Breath sounds: Normal breath sounds  No wheezing, rhonchi or rales  Abdominal:      General: Abdomen is flat  Bowel sounds are normal  There is no distension  Palpations: Abdomen is soft  Tenderness: There is no abdominal tenderness  There is no guarding  Musculoskeletal:         General: No swelling  Normal range of motion  Cervical back: Normal range of motion  Right lower leg: No edema  Left lower leg: No edema  Skin:     General: Skin is warm and dry  Neurological:      General: No focal deficit present  Mental Status: He is alert and oriented to person, place, and time  Mental status is at baseline  Motor: No tremor  Gait: Gait normal    Psychiatric:         Attention and Perception: Attention normal          Mood and Affect: Mood normal          Speech: Speech normal          Behavior: Behavior is cooperative  Discussion with Family: I personally did not discuss this case with the patient's family  I reviewed the discharge plan with the patient and answered all questions to the best of my ability  Discharge instructions/Information to patient and family:   See after visit summary for information provided to patient and family  Provisions for Follow-Up Care:  See after visit summary for information related to follow-up care and any pertinent home health orders  Disposition:     Home    For Discharges to [facility] SNF:   · Not Applicable to this Patient - Not Applicable to this Patient    Planned Readmission: n/a     Discharge Statement:  I spent 35 minutes discharging the patient  This time was spent on the day of discharge  I had direct contact with the patient on the day of discharge  Greater than 50% of the total time was spent examining patient, answering all patient questions, arranging and discussing plan of care with patient as well as directly providing post-discharge instructions    Additional time then spent on discharge activities  Discharge Medications:  See after visit summary for reconciled discharge medications provided to patient and family        ** Please Note: This note has been constructed using a voice recognition system **

## 2023-04-29 NOTE — ASSESSMENT & PLAN NOTE
Recent Labs     04/27/23  1210 04/28/23  0538 04/29/23  0552   * 95* 61*   ALT 61* 51 43   ALKPHOS 48 50 66     · Elevated LFTs on admission as above  · Likely 2/2 chronic alcohol use/alcoholic liver disease  · Pt denies any abdominal pain   · LFTs improved   · Routine monitoring   · Encourage alcohol cessation

## 2023-04-29 NOTE — ASSESSMENT & PLAN NOTE
· Home regimen of losartan 25mg QD, amlodipine 10mg QD  · Most recent /86  · Will continue home regimen   · Recommend outpatient f/u PCP

## 2023-04-29 NOTE — ASSESSMENT & PLAN NOTE
"· History of mild intermittent asthma that he reports is usually exacerbated with smoking  · Noted to have asthma exacerbation during admission with productive cough with clear sputum and wheezing  · CXR 4/27/2023: \"No acute cardiopulmonary disease  \"  · Received Solu-Medrol yesterday   · Appears stable this AM- denies acute symptoms (chest pain, SOB, dyspnea)  · VSS- afebrile, SpO2 98%  · Exacerbation appears resolved   · Will continue prednisone 40 mg daily for 5 days  · Continue PRN albuterol inhaler    · Continue to monitor vitals, symptoms   · Encourage smoking cessation   "

## 2023-04-29 NOTE — ASSESSMENT & PLAN NOTE
Patient with a history of chronic daily alcohol use  Last drink 4/26 PM  Serum alcohol 160 in the ED (4/27/2023 1210)  SEWS protocol with symptom-triggered phenobarbital for medical management of alcohol withdrawal  Received 1754 8 mg total phenobarbital, last dose administered 4/25/2023 2129  Appears stable currently off phenobarbital- VSS (aside from underlying HTN), no tremors  Continue to monitor off SEWS to ensure complete resolution of acute withdrawal

## 2023-04-29 NOTE — ASSESSMENT & PLAN NOTE
Recent Labs     04/27/23  1210 04/28/23  0538 04/29/23  0552   K 3 7 3 1* 3 7   MG 1 6* 1 7* 2 2   · Improved following supplementation   Routine monitoring of electrolytes and supplementation as indicated

## 2023-04-29 NOTE — ASSESSMENT & PLAN NOTE
Recent Labs     04/27/23  1210 04/28/23  0538 04/29/23  0552    148* 191     · Improved and stable   · Encourage alcohol cessation

## 2023-04-29 NOTE — CASE MANAGEMENT
Cm consulted with medical staff and informed pt not ready for discharge and continues to require detox management

## 2023-04-29 NOTE — PROGRESS NOTES
"[facility]  Progress Note  Name: Clark Calderon  MRN: [id number]  Unit/Bed#: 5T DETOX 711-56 I Date of Admission: 4/27/2023   Date of Service: 4/29/2023 I Hospital Day: 2      MEDICAL DETOX UNIT, LEVEL 4  Department of Medical Toxicology  Reason for Admission/Principal Problem: alcohol withdrawal   Rounding Provider: Arelis Cooper PA-C, Meghana DAVIS*       * Alcohol withdrawal syndrome with complication [facility])  Assessment & Plan  Patient with a history of chronic daily alcohol use  Last drink 4/26 PM  Serum alcohol 160 in the ED (4/27/2023 1210)  SEWS protocol with symptom-triggered phenobarbital for medical management of alcohol withdrawal  Received 1754 8 mg total phenobarbital, last dose administered 4/25/2023 2129  Appears stable currently off phenobarbital- VSS (aside from underlying HTN), no tremors  Continue to monitor off SEWS to ensure complete resolution of acute withdrawal     Mild intermittent asthma with acute exacerbation  Assessment & Plan  · History of mild intermittent asthma that he reports is usually exacerbated with smoking  · Noted to have asthma exacerbation during admission with productive cough with clear sputum and wheezing  · CXR 4/27/2023: \"No acute cardiopulmonary disease  \"  · Received Solu-Medrol yesterday   · Appears stable this AM- denies acute symptoms (chest pain, SOB, dyspnea)  · VSS- afebrile, SpO2 98%  · Exacerbation appears resolved   · Will continue prednisone 40 mg daily for 5 days  · Continue PRN albuterol inhaler    · Continue to monitor vitals, symptoms   · Encourage smoking cessation     Transaminitis  Assessment & Plan  Recent Labs     04/27/23  1210 04/28/23  0538 04/29/23  0552   * 95* 61*   ALT 61* 51 43   ALKPHOS 48 50 66     · Elevated LFTs on admission as above  · Likely 2/2 chronic alcohol use/alcoholic liver disease  · Pt denies any abdominal pain   · LFTs improved   · Routine monitoring   · Encourage " alcohol cessation    Macrocytosis without anemia  Assessment & Plan  Recent Labs     04/28/23  0538 04/29/23  0552   HGB 13 2 13 8   * 99*     · In setting of chronic alcohol use  · Continue thiamine, folic acid  · Encourage alcohol cessation    Tobacco use disorder  Assessment & Plan  · Daily tobacco use   · Offered NRT  · Encourage cessation     Primary hypertension  Assessment & Plan  · Home regimen of losartan 25mg QD, amlodipine 10mg QD  · Most recent /86  · Will continue home regimen   · Recommend outpatient f/u PCP    Alcohol use disorder, severe, dependence ([facility] [address] )  Assessment & Plan  Pt with a h/o chronic heavy alcohol use  Drinks >10 shots of liquor, and couple of beers daily   Interested in PO  naltrexone- will start today   Withdrawal management as above  Daily thiamine/folic acid supplementation, and MVI  Consult case management for assistance with aftercare resources - pt interested in outpatient resources upon discharge     Leukopenia-resolved as of 4/29/2023  Assessment & Plan  Recent Labs     04/27/23  1210 04/28/23  0538 04/29/23  0552   WBC 3 89* 4 05* 5 48   · improved and stable   · No signs of active infection  · Routine monitoring   · Encourage alcohol cessation     Thrombocytopenia (HCC)-resolved as of 4/29/2023  Assessment & Plan  Recent Labs     04/27/23  1210 04/28/23  0538 04/29/23  0552    148* 191     · Improved and stable   · Encourage alcohol cessation     Hypokalemia-resolved as of 4/29/2023  Assessment & Plan  Recent Labs     04/27/23  1210 04/28/23  0538 04/29/23  0552   K 3 7 3 1* 3 7   MG 1 6* 1 7* 2 2     · Improved following supplementation   · Routine monitoring of electrolytes and supplementation as indicated     Hypomagnesemia-resolved as of 4/29/2023  Assessment & Plan  Recent Labs     04/27/23  1210 04/28/23  0538 04/29/23  0552   K 3 7 3 1* 3 7   MG 1 6* 1 7* 2 2   · Improved following supplementation   Routine monitoring of electrolytes and supplementation as indicated       VTE Pharmacologic Prophylaxis:   Pharmacologic: Enoxaparin (Lovenox)  Mechanical VTE Prophylaxis in Place: yes    Code Status: Level 1 - Full Code    Patient Centered Rounds: I have performed bedside rounds with nursing staff today  Discussions with Specialists or Other Care Team Provider: Dr Vidya Nova, [facility]    Education and Discussions with Family / Patient: I personally did not discuss patient with family at this time  Discussed current plan with patient, answered all questions to best of my ability  Time Spent for Care: 20 minutes  More than 50% of total time spent on counseling and coordination of care as described above  Current Length of Stay: 2 day(s)    Current Patient Status: Inpatient     Certification Statement: The patient will continue to require additional inpatient hospital stay due to ongoing monitoring of alcohol withdrawal  Discharge Plan: home once medically stable- anticipate next 24-48 hrs      Total time spent today 20 minutes  Greater than 50% of total time was spent with the patient and / or family counseling and / or coordination of care  A description of the counseling / coordination of care: AUD, naltrexone     Subjective:   Patient seen and examined bedside this morning  States he is feeling better overall  Currently denies headaches, lightheadedness/dizziness, coughing/sneezing/congestion/rhinorrhea, chest pain, SOB/dyspnea, abdominal pain, N/V/C, dysuria/hematuria, hallucinations  Interested in naltrexone       Objective:     Clinical Opiate Withdrawal Scale  Pulse: 87    SEWS Total Score: 0 (4/29/2023  5:40 AM)        Last 24 Hours Medication List:   Current Facility-Administered Medications   Medication Dose Route Frequency Provider Last Rate    acetaminophen  650 mg Oral Q6H PRN BECCA Vaca      albuterol  2 puff Inhalation Q4H PRN BECCA Vaca      amLODIPine  10 mg Oral Daily SwathiBECCA Mcghee      enoxaparin 40 mg Subcutaneous Daily Ale Slocumb, CRNP      folic acid  1 mg Oral Daily Ale Slocumb, CRNP      guaiFENesin  600 mg Oral Q12H [address] Swathi Pineda, CRNP      hydrOXYzine HCL  50 mg Oral Q6H PRN Juli Cooper PA-JESSENIA      losartan  25 mg Oral Daily GINETTE Valencia-JESSENIA      multivitamin-minerals  1 tablet Oral Daily Ale Slocumb, CRNP      naltrexone  50 mg Oral Daily Juli Defmireyacisco, PA-JESSENIA      nicotine  1 patch Transdermal Daily Ale Slocumb, CRNP      nicotine polacrilex  2 mg Oral Q2H PRN Ana Hernandez MD      ondansetron  4 mg Intravenous Q6H PRN Ale Slocumb, CRNP      predniSONE  40 mg Oral Daily Juli Defrancisco, PA-C      thiamine  100 mg Oral Daily Swathi Pineda, CRNP      traZODone  50 mg Oral HS PRN Ale Slocumb, CRNP           Vitals:   Temp (24hrs), Av 1 °F (36 7 °C), Min:98 °F (36 7 °C), Max:98 3 °F (36 8 °C)    Temp:  [98 °F (36 7 °C)-98 3 °F (36 8 °C)] 98 3 °F (36 8 °C)  HR:  [] 87  Resp:  [16-18] 18  BP: (149-155)/(86-99) 152/91  SpO2:  [95 %-98 %] 97 %  Body mass index is 23 82 kg/m²  Input and Output Summary (last 24 hours): Intake/Output Summary (Last 24 hours) at 2023 1432  Last data filed at 2023 1001  Gross per 24 hour   Intake 960 ml   Output --   Net 960 ml       Physical Exam:   Physical Exam  Vitals and nursing note reviewed  Constitutional:       General: He is not in acute distress  Appearance: Normal appearance  He is well-developed and normal weight  He is not ill-appearing or diaphoretic  HENT:      Head: Normocephalic and atraumatic  Eyes:      General: No scleral icterus  Extraocular Movements: Extraocular movements intact  Right eye: No nystagmus  Left eye: No nystagmus  Conjunctiva/sclera: Conjunctivae normal       Pupils: Pupils are equal, round, and reactive to light  Cardiovascular:      Rate and Rhythm: Normal rate and regular rhythm        Pulses: Normal pulses  Dorsalis pedis pulses are 2+ on the right side and 2+ on the left side  Posterior tibial pulses are 2+ on the right side and 2+ on the left side  Heart sounds: Normal heart sounds  No murmur heard  No friction rub  No gallop  Pulmonary:      Effort: Pulmonary effort is normal  No respiratory distress  Breath sounds: Normal breath sounds  No wheezing, rhonchi or rales  Abdominal:      General: Abdomen is flat  Bowel sounds are normal  There is no distension  Palpations: Abdomen is soft  Tenderness: There is no abdominal tenderness  There is no guarding  Musculoskeletal:         General: No swelling  Normal range of motion  Cervical back: Normal range of motion  Right lower leg: No edema  Left lower leg: No edema  Skin:     General: Skin is warm and dry  Neurological:      General: No focal deficit present  Mental Status: He is alert and oriented to person, place, and time  Mental status is at baseline  Motor: No tremor  Gait: Gait normal    Psychiatric:         Attention and Perception: Attention normal          Mood and Affect: Mood is anxious  Speech: Speech normal          Behavior: Behavior is cooperative  Additional Data:     Labs: keep all most recent labs as listed on admission templates   Results from last 7 days   Lab Units 04/29/23  0552   WBC Thousand/uL 5 48   HEMOGLOBIN g/dL 13 8   HEMATOCRIT % 39 9   PLATELETS Thousands/uL 191   NEUTROS PCT % 66   LYMPHS PCT % 23   MONOS PCT % 9   EOS PCT % 1      Results from last 7 days   Lab Units 04/29/23  0552   SODIUM mmol/L 136   POTASSIUM mmol/L 3 7   CHLORIDE mmol/L 102   CO2 mmol/L 25   BUN mg/dL 6   CREATININE mg/dL 0 81   ANION GAP mmol/L 9   CALCIUM mg/dL 9 2   ALBUMIN g/dL 4 2   TOTAL BILIRUBIN mg/dL 0 73   ALK PHOS U/L 66   ALT U/L 43   AST U/L 61*   GLUCOSE RANDOM mg/dL 112        * I Have Reviewed All Lab Data Listed Above    * Additional Pertinent Lab Tests Reviewed: All Labs Within Last 24 Hours Reviewed      Imaging Studies: I have personally reviewed pertinent reports  Recent Cultures (last 7 days): Today, Patient Was Seen By: Ting Cooper PA-C    ** Please Note: Dictation voice to text software may have been used in the creation of this document   **

## 2023-04-29 NOTE — ASSESSMENT & PLAN NOTE
Recent Labs     04/28/23  0538 04/29/23  0552   HGB 13 2 13 8   * 99*     · In setting of chronic alcohol use  · Continue thiamine, folic acid  · Encourage alcohol cessation

## 2023-04-29 NOTE — ASSESSMENT & PLAN NOTE
Recent Labs     04/27/23  1210 04/28/23  0538 04/29/23  0552   WBC 3 89* 4 05* 5 48   · improved and stable   · No signs of active infection  · Routine monitoring   · Encourage alcohol cessation

## 2023-04-29 NOTE — ASSESSMENT & PLAN NOTE
Pt with a h/o chronic heavy alcohol use  Drinks >10 shots of liquor, and couple of beers daily   Interested in PO  naltrexone- will start today   Withdrawal management as above  Daily thiamine/folic acid supplementation, and MVI  Consult case management for assistance with aftercare resources - pt interested in outpatient resources upon discharge

## 2023-04-30 VITALS
OXYGEN SATURATION: 97 % | HEIGHT: 67 IN | HEART RATE: 78 BPM | TEMPERATURE: 97.5 F | SYSTOLIC BLOOD PRESSURE: 149 MMHG | BODY MASS INDEX: 23.88 KG/M2 | RESPIRATION RATE: 18 BRPM | WEIGHT: 152.12 LBS | DIASTOLIC BLOOD PRESSURE: 100 MMHG

## 2023-04-30 LAB
ANION GAP SERPL CALCULATED.3IONS-SCNC: 12 MMOL/L (ref 4–13)
BUN SERPL-MCNC: 11 MG/DL (ref 5–25)
CALCIUM SERPL-MCNC: 9.1 MG/DL (ref 8.4–10.2)
CHLORIDE SERPL-SCNC: 104 MMOL/L (ref 96–108)
CO2 SERPL-SCNC: 23 MMOL/L (ref 21–32)
CREAT SERPL-MCNC: 0.8 MG/DL (ref 0.6–1.3)
GFR SERPL CREATININE-BSD FRML MDRD: 110 ML/MIN/1.73SQ M
GLUCOSE SERPL-MCNC: 91 MG/DL (ref 65–140)
MAGNESIUM SERPL-MCNC: 2.1 MG/DL (ref 1.9–2.7)
POTASSIUM SERPL-SCNC: 3.4 MMOL/L (ref 3.5–5.3)
SODIUM SERPL-SCNC: 139 MMOL/L (ref 135–147)

## 2023-04-30 RX ORDER — LOSARTAN POTASSIUM 50 MG/1
50 TABLET ORAL DAILY
Status: DISCONTINUED | OUTPATIENT
Start: 2023-05-01 | End: 2023-04-30

## 2023-04-30 RX ORDER — LOSARTAN POTASSIUM 25 MG/1
25 TABLET ORAL DAILY
Status: DISCONTINUED | OUTPATIENT
Start: 2023-05-01 | End: 2023-04-30 | Stop reason: HOSPADM

## 2023-04-30 RX ORDER — HYDROXYZINE 50 MG/1
50 TABLET, FILM COATED ORAL EVERY 6 HOURS PRN
Qty: 30 TABLET | Refills: 0 | Status: SHIPPED | OUTPATIENT
Start: 2023-04-30

## 2023-04-30 RX ORDER — NALTREXONE HYDROCHLORIDE 50 MG/1
50 TABLET, FILM COATED ORAL DAILY
Qty: 30 TABLET | Refills: 0 | Status: SHIPPED | OUTPATIENT
Start: 2023-05-01

## 2023-04-30 RX ORDER — TRAZODONE HYDROCHLORIDE 50 MG/1
50 TABLET ORAL
Qty: 30 TABLET | Refills: 0 | Status: SHIPPED | OUTPATIENT
Start: 2023-04-30

## 2023-04-30 RX ORDER — ALBUTEROL SULFATE 90 UG/1
2 AEROSOL, METERED RESPIRATORY (INHALATION) EVERY 6 HOURS PRN
Qty: 18 G | Refills: 0 | Status: SHIPPED | OUTPATIENT
Start: 2023-04-30

## 2023-04-30 RX ORDER — PREDNISONE 20 MG/1
40 TABLET ORAL DAILY
Qty: 3 TABLET | Refills: 0 | Status: SHIPPED | OUTPATIENT
Start: 2023-05-01

## 2023-04-30 RX ORDER — POTASSIUM CHLORIDE 20 MEQ/1
40 TABLET, EXTENDED RELEASE ORAL ONCE
Status: COMPLETED | OUTPATIENT
Start: 2023-04-30 | End: 2023-04-30

## 2023-04-30 RX ADMIN — GUAIFENESIN 600 MG: 600 TABLET, EXTENDED RELEASE ORAL at 08:19

## 2023-04-30 RX ADMIN — NICOTINE 1 PATCH: 21 PATCH, EXTENDED RELEASE TRANSDERMAL at 08:15

## 2023-04-30 RX ADMIN — THIAMINE HCL TAB 100 MG 100 MG: 100 TAB at 08:16

## 2023-04-30 RX ADMIN — LOSARTAN POTASSIUM 25 MG: 25 TABLET, FILM COATED ORAL at 08:19

## 2023-04-30 RX ADMIN — NALTREXONE HYDROCHLORIDE 50 MG: 50 TABLET, FILM COATED ORAL at 08:18

## 2023-04-30 RX ADMIN — MULTIPLE VITAMINS W/ MINERALS TAB 1 TABLET: TAB ORAL at 08:16

## 2023-04-30 RX ADMIN — POTASSIUM CHLORIDE 40 MEQ: 1500 TABLET, EXTENDED RELEASE ORAL at 08:18

## 2023-04-30 RX ADMIN — PREDNISONE 40 MG: 20 TABLET ORAL at 08:17

## 2023-04-30 RX ADMIN — AMLODIPINE BESYLATE 10 MG: 10 TABLET ORAL at 08:19

## 2023-04-30 RX ADMIN — FOLIC ACID 1 MG: 1 TABLET ORAL at 08:16

## 2023-04-30 NOTE — ASSESSMENT & PLAN NOTE
Pt with a h/o chronic heavy alcohol use  Drinks >10 shots of liquor, and couple of beers daily   Naltrexone started yesterday- continue   Withdrawal management as above  Daily thiamine/folic acid supplementation, and MVI  Consult case management for assistance with aftercare resources - pt interested in outpatient resources upon discharge

## 2023-04-30 NOTE — ASSESSMENT & PLAN NOTE
Recent Labs     04/28/23  0538 04/29/23  0552   WBC 4 05* 5 48   · improved and stable   · No signs of active infection  · Routine monitoring   · Encourage alcohol cessation

## 2023-04-30 NOTE — ASSESSMENT & PLAN NOTE
Recent Labs     04/28/23  0538 04/29/23  0552   AST 95* 61*   ALT 51 43   ALKPHOS 50 66     · Elevated LFTs on admission as above  · Likely 2/2 chronic alcohol use/alcoholic liver disease  · Pt denies any abdominal pain   · LFTs improved   · Routine outpatient monitoring   · Encourage alcohol cessation

## 2023-04-30 NOTE — ASSESSMENT & PLAN NOTE
Recent Labs     04/28/23  0538 04/29/23  0552   * 191     · Improved and stable   · Encourage alcohol cessation

## 2023-04-30 NOTE — ASSESSMENT & PLAN NOTE
· Home regimen of losartan 25mg QD, amlodipine 10mg QD  · Notes he has been non-compliant with home meds   · Most recent /100  · Will continue home regimen - encourage compliance  · Educated regarding return precautions   · Recommend outpatient f/u PCP

## 2023-04-30 NOTE — PLAN OF CARE
Problem: Potential for Falls  Goal: Patient will remain free of falls  Description: INTERVENTIONS:  - Educate patient/family on patient safety including physical limitations  - Instruct patient to call for assistance with activity   - Consult OT/PT to assist with strengthening/mobility   - Keep Call bell within reach  - Keep bed low and locked with side rails adjusted as appropriate  - Keep care items and personal belongings within reach  - Initiate and maintain comfort rounds  - Make Fall Risk Sign visible to staff  - Apply yellow socks and bracelet for high fall risk patients  - Consider moving patient to room near nurses station  4/30/2023 1013 by Maria Elena Ku RN  Outcome: Adequate for Discharge  4/30/2023 0739 by Maria Elena Ku, RN  Outcome: Progressing

## 2023-04-30 NOTE — ASSESSMENT & PLAN NOTE
"· History of mild intermittent asthma that he reports is usually exacerbated with smoking  · Noted to have asthma exacerbation early in admission with productive cough with clear sputum and wheezing  · CXR 4/27/2023: \"No acute cardiopulmonary disease  \"  · Received Solu-Medrol 4/28  · Appears stable this AM- denies acute symptoms (chest pain, SOB, dyspnea)  · VSS- afebrile, SpO2 97%  · Mild expiratory wheezing noted   · Exacerbation appears resolved   · Will continue prednisone 40 mg daily for 5 days total (3 days remaining)  · Continue PRN albuterol inhaler    · Continue to monitor vitals, symptoms   · Encourage smoking cessation   · Recommend outpatient f/u PCP  "

## 2023-04-30 NOTE — ASSESSMENT & PLAN NOTE
Recent Labs     04/28/23  0538 04/29/23  0552 04/30/23  0456   K 3 1* 3 7 3 4*   MG 1 7* 2 2 2 1     · Mild hypokalemia this AM  · Administer supplementation  · Routine outpatient monitoring

## 2023-04-30 NOTE — NURSING NOTE
Discharge instructions provided to patient all questions answered at time of d/c  No IV in place, patient ambulated off unit in stable condition

## 2023-04-30 NOTE — ASSESSMENT & PLAN NOTE
Patient with a history of chronic daily alcohol use  Last drink 4/26 PM  Serum alcohol 160 in the ED (4/27/2023 1210)  SEWS protocol with symptom-triggered phenobarbital for medical management of alcohol withdrawal  Received 1754 8 mg total phenobarbital, last dose administered 4/25/2023 2129  Appears stable currently off phenobarbital- VSS (aside from underlying HTN), no tremors  Acute withdrawal resolved

## 2023-04-30 NOTE — ASSESSMENT & PLAN NOTE
Recent Labs     04/28/23  0538 04/29/23  0552 04/30/23  0456   K 3 1* 3 7 3 4*   MG 1 7* 2 2 2 1   resolved and stable

## 2023-04-30 NOTE — CASE MANAGEMENT
Case Management Discharge Planning Note    Patient name Isidoro Marked Tree  Location 5T DETOX 509/5T DETOX 50* MRN [id number]  : 1980 Date 2023       Current Admission Date: 2023  Current Admission Diagnosis:Alcohol use disorder, severe, dependence ([facility] )   Patient Active Problem List    Diagnosis Date Noted    Macrocytosis without anemia 2023    Alcohol use disorder, severe, dependence ([facility] ) 2023    Transaminitis 2023    Primary hypertension 2023    Mild intermittent asthma with acute exacerbation 2023    Tobacco use disorder 2023    Hemorrhoids 2022      LOS (days): 3  Geometric Mean LOS (GMLOS) (days):   Days to GMLOS:     OBJECTIVE:  Risk of Unplanned Readmission Score: 7 94         Current admission status: Inpatient   Preferred Pharmacy:   [facility] [address] [place] [address] 113  [address] [facility] [address]  Phone: [phone] Fax: [phone]    Primary Care Provider: Jacqueline Santiago MD    Primary Insurance: [address]:     DISCHARGE DETAILS:     Lorene Greene is discharging to his home between 10:30 AM - 11:00 Tatyana Alonzo wife, Olga Farias, will be providing transportation  No contacts were made on [address] behalf at his request  [address] preferred [facility], [address], [place], [address]                                Other Referral/Resources/Interventions Provided:  Referrals Provided[de-identified] Crisis Hotline, IOP, Other (Specify), Peer Specialist, Support Group, Therapist (Inpt substance abuse treatment)    Would you like to participate in our [address] service program?  : No - Declined

## 2023-05-01 ENCOUNTER — TRANSITIONAL CARE MANAGEMENT (OUTPATIENT)
Dept: FAMILY MEDICINE CLINIC | Facility: CLINIC | Age: 43
End: 2023-05-01

## 2023-05-01 NOTE — UTILIZATION REVIEW
NOTIFICATION OF ADMISSION DISCHARGE   This is a Notification of Discharge from [address] [place] [address]  Please be advised that this patient has been discharge from our facility  Below you will find the admission and discharge date and time including the patients disposition  UTILIZATION REVIEW CONTACT:  Scout Pool MA  Utilization   Network Utilization Review Department  Phone: [phone] x carefully listen to the prompts  All voicemails are confidential   Email: [email or web address]     ADMISSION INFORMATION  PRESENTATION DATE: 4/27/2023 11:30 AM  OBERVATION ADMISSION DATE:   INPATIENT ADMISSION DATE: 4/27/23  1:09 PM   DISCHARGE DATE: 4/30/2023 11:06 AM   DISPOSITION:Home/Self Care    IMPORTANT INFORMATION:  Send all requests for admission clinical reviews, approved or denied determinations and any other requests to dedicated fax number below belonging to the campus where the patient is receiving treatment   List of dedicated fax numbers:  [address] DENIALS (Administrative/Medical Necessity) [phone]   [address] (Maternity/NICU/Pediatrics) [phone]   [place] [phone]   [address] [place] [address] [phone]   [address] [place] [address] [phone]   2000 [facility] [address] [place] [address],[address] 1525 [facility] [phone]   [facility]  [phone]   [address] [facility] [address]  [address] [facility] 1000 [facility] [phone]

## 2023-05-05 ENCOUNTER — OFFICE VISIT (OUTPATIENT)
Dept: FAMILY MEDICINE CLINIC | Facility: CLINIC | Age: 43
End: 2023-05-05

## 2023-05-05 VITALS
RESPIRATION RATE: 18 BRPM | OXYGEN SATURATION: 98 % | SYSTOLIC BLOOD PRESSURE: 118 MMHG | HEART RATE: 97 BPM | WEIGHT: 159 LBS | TEMPERATURE: 98.5 F | HEIGHT: 67 IN | BODY MASS INDEX: 24.96 KG/M2 | DIASTOLIC BLOOD PRESSURE: 92 MMHG

## 2023-05-05 DIAGNOSIS — E55.9 VITAMIN D DEFICIENCY: Primary | ICD-10-CM

## 2023-05-05 DIAGNOSIS — F10.20 ALCOHOL USE DISORDER, SEVERE, DEPENDENCE (HCC): ICD-10-CM

## 2023-05-05 DIAGNOSIS — Z13.1 SCREENING FOR DIABETES MELLITUS: ICD-10-CM

## 2023-05-05 DIAGNOSIS — E78.1 HYPERTRIGLYCERIDEMIA: ICD-10-CM

## 2023-05-05 DIAGNOSIS — Z13.29 THYROID DISORDER SCREENING: ICD-10-CM

## 2023-05-05 DIAGNOSIS — Z11.59 NEED FOR HEPATITIS C SCREENING TEST: ICD-10-CM

## 2023-05-05 DIAGNOSIS — I10 PRIMARY HYPERTENSION: ICD-10-CM

## 2023-05-05 DIAGNOSIS — F33.1 MODERATE EPISODE OF RECURRENT MAJOR DEPRESSIVE DISORDER (HCC): ICD-10-CM

## 2023-05-05 NOTE — PROGRESS NOTES
TCM Call     Date and time call was made  5/1/2023 11:26 AM    Hospital care reviewed  Records reviewed    Patient was hospitialized at  [address]    Date of Admission  04/27/23    Date of discharge  04/30/23    Diagnosis  Alcohol withdrawal syndrome with complication    Disposition  Home    Were the patients medications reviewed and updated  Yes    Current Symptoms  None      TCM Call     Post hospital issues  None    Should patient be enrolled in anticoag monitoring? No    Scheduled for follow up?   No    Did you obtain your prescribed medications  Yes    Do you need help managing your prescriptions or medications  No    Is transportation to your appointment needed  No    I have advised the patient to call PCP with any new or worsening symptoms  Jeannie Fernandez MA    Living Arrangements  Spouse or Significiant other    Support System  Spouse    The type of support provided  Emotional; Financial; Physical    Do you have social support  Yes, as much as I need    Are you recieving any outpatient services  No    Are you recieving home care services  No    Are you using any community resources  No    Current waiver services  No    Have you fallen in the last 12 months  No    Interperter language line needed  No    Counseling  Patient      Assessment/Plan:           Problem List Items Addressed This Visit        Cardiovascular and Mediastinum    Primary hypertension    Relevant Orders    UA (URINE) with reflex to Scope       Other    Alcohol use disorder, severe, dependence ([facility] [address] )   Other Visit Diagnoses     Vitamin D deficiency    -  Primary    Relevant Orders    Vitamin D Panel    Screening for diabetes mellitus        Relevant Orders    CBC and differential    Comprehensive metabolic panel    Thyroid disorder screening        Relevant Orders    TSH, 3rd generation    Hypertriglyceridemia        Relevant Orders    Lipid panel    Need for hepatitis C screening test        Relevant Orders    Hepatitis C "Antibody    Moderate episode of recurrent major depressive disorder [facility])        Relevant Orders    Ambulatory Referral to Psychiatry            Subjective:      Patient ID: Jackie Galvan is a 43 y o  male  HPI  Patient is here to follow-up her recent hospitalization for acute alcohol withdrawal syndrome  Patient serum alcohol was 160  Toxicology was consulted  Patient has concomitant tobacco use and major depressive disorder  Naltrexone was started in the hospital   Patient was advised to continue with thiamine and folic acid  Patient was also noted to have hypomagnesemia hypokalemia thrombocytopenia and leukopenia  Transaminitis was noted  Lab studies will be ordered  Patient has not had an appoint with psychiatry yet however I will try my best to get him in soon  He is already seeing a therapist   He plans to continue with alcoholic Anonymous as his insurance does not cover services at [address] [place] [address]  He has been sober for 9 days  He reports feeling very well  Is still recovering from right shoulder pain he is not taking any narcotic  The following portions of the patient's history were reviewed and updated as appropriate: allergies, current medications, past family history, past medical history, past social history, past surgical history and problem list     Review of Systems      Objective:      /92   Pulse 97   Temp 98 5 °F (36 9 °C)   Resp 18   Ht 5' 7\" (1 702 m)   Wt 72 1 kg (159 lb)   SpO2 98%   BMI 24 90 kg/m²          Physical Exam  Constitutional:       Appearance: Normal appearance  Cardiovascular:      Rate and Rhythm: Normal rate and regular rhythm  Heart sounds: Normal heart sounds  No murmur heard  Pulmonary:      Effort: Pulmonary effort is normal  No respiratory distress  Breath sounds: No wheezing or rales  Abdominal:      General: There is no distension  Tenderness: There is no abdominal tenderness  There is no guarding     Neurological:      " Mental Status: He is alert and oriented to person, place, and time     Psychiatric:         Mood and Affect: Mood normal          Behavior: Behavior normal

## 2023-05-12 ENCOUNTER — PATIENT OUTREACH (OUTPATIENT)
Dept: FAMILY MEDICINE CLINIC | Facility: CLINIC | Age: 43
End: 2023-05-12

## 2023-05-12 DIAGNOSIS — Z78.9 NEED FOR FOLLOW-UP BY SOCIAL WORKER: Primary | ICD-10-CM

## 2023-05-15 DIAGNOSIS — F10.20 ALCOHOL USE DISORDER, SEVERE, DEPENDENCE (HCC): ICD-10-CM

## 2023-05-15 DIAGNOSIS — I10 HYPERTENSION, UNSPECIFIED TYPE: ICD-10-CM

## 2023-05-15 RX ORDER — TRAZODONE HYDROCHLORIDE 50 MG/1
50 TABLET ORAL
Qty: 30 TABLET | Refills: 0 | Status: CANCELLED | OUTPATIENT
Start: 2023-05-15

## 2023-05-15 NOTE — TELEPHONE ENCOUNTER
Patient is requesting refills of losartan, trazodone, and hydroxyzine HCl  Patient states he takes two 50mg hydroxyzine HCl pills and is requesting that change be made or he be given an equivalent until he gets off the waiting list to see psychiatry

## 2023-05-16 ENCOUNTER — TELEPHONE (OUTPATIENT)
Dept: PSYCHIATRY | Facility: CLINIC | Age: 43
End: 2023-05-16

## 2023-05-16 ENCOUNTER — PATIENT OUTREACH (OUTPATIENT)
Dept: FAMILY MEDICINE CLINIC | Facility: CLINIC | Age: 43
End: 2023-05-16

## 2023-05-16 NOTE — TELEPHONE ENCOUNTER
Received phone call from patient and  regarding follow up after discharge from Detox  Patient stated he is in need of a Psychiatrist appointment to continue his medications, however he is experiencing issues with facilities accepting his insurance  Due to address showing he lives in [facility], advised him to call [facility]  He stated he was turned away by them because of his insurance, and other facilities have done the same  Informed him I can add him to our wait list and he will be called when we start accepting new patients  I recommended he requests refills through his PCP until he can be seen, and contact [address] [place] [address] for MAT maintenance  He verbalized understanding   Patient added to wait list

## 2023-05-16 NOTE — PROGRESS NOTES
ARTEMIO CHAHAL received referral from Dr Richy Villanueva regarding patient needing assistance connecting with psychiatry  Patient was recently discharged from [address] [place] [address] Unit for acute alcohol withdraw  He was set up for [address] but reported to provide they did not accept his insurance  ARTEMIO CHAHAL did place call to the patient Kait Soliman who advised has been trying to get into psychiatrist locations but they have been 8 month waiting lists  Prior to hospitalization he tried [facility] but after going through intake process was told insurance does not cover the services  Patient does see psychologist on own but needs medication management  He had gone after to discharge to [address] but the same thing happened  Kait Starkeyley advised that he is currently out of his anxiety medications and running low on withdrawal medications  He contacted PCP and is awaiting response  He continued that his anxiety medication were not working as a single dose so was taking double  Provider aware  He reported minimal cravings but was leaning on the anxiety medications to assist with the symptoms  Kait Soliman reported only struggling with alcohol and no other drug  ARTEMIO CHAHAL and Saeid discussed [facility]  ARTEMIO CHAHAL facilitated 3-way call and spoke with Roni Blanton who advised that they are not currently accepting new patients  It is hopefully temporary and Kait Soliman was placed on their waiting list      ARTEMIO CHAHAL and Kait Soliman discussed other options  Kait Soliman will try calling [employer]  He does not need a CRS or other support resources at this time  Kait Soliman identified wife as supportive  Following conversation ARTEMIO CHAHAL placed call to [facility] [employer] who advised not currently accepting new clients  They accept [facility] but patient would need to verify with insurance provider  RATEMIO CHAHAL emailed Kait Jourdan the link to search his insurance plan for what is in network  ARTEMIO CHAHAL had it set to where currently residing in [place]   ARTEMIO CHAHAL sent information to contact [facility]  SW CM will continue to remain available for psychosocial support as needed

## 2023-05-17 ENCOUNTER — PATIENT OUTREACH (OUTPATIENT)
Dept: FAMILY MEDICINE CLINIC | Facility: CLINIC | Age: 43
End: 2023-05-17

## 2023-05-17 DIAGNOSIS — F10.20 ALCOHOL USE DISORDER, SEVERE, DEPENDENCE (HCC): ICD-10-CM

## 2023-05-17 RX ORDER — TRAZODONE HYDROCHLORIDE 50 MG/1
50 TABLET ORAL
Qty: 30 TABLET | Refills: 3 | Status: SHIPPED | OUTPATIENT
Start: 2023-05-17 | End: 2023-09-20

## 2023-05-17 RX ORDER — NALTREXONE HYDROCHLORIDE 50 MG/1
50 TABLET, FILM COATED ORAL DAILY
Qty: 30 TABLET | Refills: 3 | Status: SHIPPED | OUTPATIENT
Start: 2023-05-17 | End: 2023-07-22

## 2023-05-17 RX ORDER — LOSARTAN POTASSIUM 25 MG/1
25 TABLET ORAL DAILY
Qty: 30 TABLET | Refills: 3 | Status: SHIPPED | OUTPATIENT
Start: 2023-05-17

## 2023-05-17 RX ORDER — HYDROXYZINE 50 MG/1
50 TABLET, FILM COATED ORAL EVERY 6 HOURS PRN
Qty: 30 TABLET | Refills: 0 | Status: SHIPPED | OUTPATIENT
Start: 2023-05-17

## 2023-05-17 NOTE — PROGRESS NOTES
ARTEMIO CHAHAL received message from provider that she refilled patient's medications  He advised that it cannot be filled until 5/27  ARTEMIO CHAHAL encouraged him to [employer] and speak with their medication line to be provided reason for delay and if there is an override option  Patel Gilbert agreed and will update ARTEMIO Gilbert reported that his Hydroxyzine and Losartan  ARTEMIO CHAHAL will follow up with provider  Patel Gilbert did not call [facility] yet and ARTEMIO CHAHAL encouraged him to contact them today  ARTEMIO CHAHAL will continue to remain available for psychosocial support as needed

## 2023-05-22 ENCOUNTER — PATIENT OUTREACH (OUTPATIENT)
Dept: FAMILY MEDICINE CLINIC | Facility: CLINIC | Age: 43
End: 2023-05-22

## 2023-05-22 NOTE — PROGRESS NOTES
ARTEMIO CHAHAL placed follow up call to the patient, Alex Valladares who advised saw that the rest of his medications were filled and expressed gratitude  For the other medications he did not call the insurance company and will wait until 5/27 to pick them up  Alexmedardo Valladares has not contacted [facility] yet but will call them today  He will update ARTEMIO ULLOA CM forwarded email previously sent to his spouse to [email or web address]    ARTEMIO CHAHAL will continue to remain available for psychosocial support as needed

## 2023-05-30 ENCOUNTER — PATIENT OUTREACH (OUTPATIENT)
Dept: FAMILY MEDICINE CLINIC | Facility: CLINIC | Age: 43
End: 2023-05-30

## 2023-05-30 NOTE — PROGRESS NOTES
ARTEMIO CHAHAL placed follow up call to the patient, Xenia Joseph who confirmed able to  his medications without issue  He reported having his assessment today at [facility]  ARTEMIO CHAHAL will continue to remain available for psychosocial support as needed

## 2023-06-09 ENCOUNTER — PATIENT OUTREACH (OUTPATIENT)
Dept: FAMILY MEDICINE CLINIC | Facility: CLINIC | Age: 43
End: 2023-06-09

## 2023-06-09 NOTE — PROGRESS NOTES
ARTEMIO CHAHAL placed follow up call to the patient, Joslyn Martinez who confirmed intake with [facility]  To his knowledge insurance went through  His first 1:1 appointment will be on Monday (every other week)  He will attend groups M,W,TH  He will then will meet with their doctor to discuss ongoing medication management  He wants to discuss vivitrol with them  If insurance becomes an issue he is aware to ask for [facility] assistance and then contact insurance company  If still having trouble he can contact ARTEMIO ULLOA CM will close referral due to goals met  Patient is aware can contact ARTEMIO CHAHAL as needed  ARTEMIO CHAHAL will remain avaialble for future psychosocial support as needed

## 2023-07-13 ENCOUNTER — TELEMEDICINE (OUTPATIENT)
Dept: FAMILY MEDICINE CLINIC | Facility: CLINIC | Age: 43
End: 2023-07-13
Payer: COMMERCIAL

## 2023-07-13 DIAGNOSIS — J02.0 STREP PHARYNGITIS: Primary | ICD-10-CM

## 2023-07-13 PROCEDURE — 99441 PR PHYS/QHP TELEPHONE EVALUATION 5-10 MIN: CPT | Performed by: INTERNAL MEDICINE

## 2023-07-13 RX ORDER — AMOXICILLIN 875 MG/1
875 TABLET, COATED ORAL 2 TIMES DAILY
Qty: 14 TABLET | Refills: 0 | Status: SHIPPED | OUTPATIENT
Start: 2023-07-13 | End: 2023-07-22

## 2023-07-13 NOTE — PROGRESS NOTES
Virtual Regular Visit    Verification of patient location:    Patient is located at Home in the following state in which I hold an active license [place]      Assessment/Plan:    Problem List Items Addressed This Visit    None  Visit Diagnoses     Strep pharyngitis    -  Primary    Relevant Medications    amoxicillin (AMOXIL) 875 mg tablet               Reason for visit is   Chief Complaint   Patient presents with   • Sore Throat     Patient was seen yesterday and tested for strep throat and he came back negative but his wife is positive. • Virtual Regular Visit        Encounter provider Ivelisse Ashton MD    Provider located at [address] ,[facility] 101 100  [address] [facility] [phone]      Recent Visits  No visits were found meeting these conditions. Showing recent visits within past 7 days and meeting all other requirements  Today's Visits  Date Type Provider Dept   07/13/23 Telemedicine Ivelisse Ashton MD [address] [facility] today's visits and meeting all other requirements  Future Appointments  No visits were found meeting these conditions. Showing future appointments within next 150 days and meeting all other requirements       The patient was identified by name and date of birth. Verónica Fuller was informed that this is a telemedicine visit and that the visit is being conducted through Telephone. My office door was closed. No one else was in the room. He acknowledged consent and understanding of privacy and security of the video platform. The patient has agreed to participate and understands they can discontinue the visit at any time. Patient is aware this is a billable service. Subjective  Verónica Fuller is a 43 y.o. male . Patient is being seen through telehealth to discuss upper respiratory infection started about 2 days ago.   Patient was seen in urgent care had a strep test which was negative however patient recalls gagging on it when she was trying to collect the specimen. He reports sore throat generalized body aches and pains swollen lymph nodes a low-grade temperature. His wife was tested positive today for strep.      HPI     Past Medical History:   Diagnosis Date   • GERD (gastroesophageal reflux disease)    • Hypertension    • Rectal bleed        Past Surgical History:   Procedure Laterality Date   • EYE SURGERY     • SHOULDER SURGERY Right 12/08/2022       Current Outpatient Medications   Medication Sig Dispense Refill   • amoxicillin (AMOXIL) 875 mg tablet Take 1 tablet (875 mg total) by mouth 2 (two) times a day for 7 days 14 tablet 0   • albuterol (Ventolin HFA) 90 mcg/act inhaler Inhale 2 puffs every 6 (six) hours as needed for wheezing 18 g 0   • amLODIPine (NORVASC) 10 mg tablet Take 1 tablet (10 mg total) by mouth daily 30 tablet 1   • folic acid (KP Folic Acid) 1 mg tablet Take 1 tablet (1 mg total) by mouth daily 90 tablet 1   • hydrOXYzine HCL (ATARAX) 50 mg tablet Take 1 tablet (50 mg total) by mouth every 6 (six) hours as needed for anxiety 30 tablet 0   • losartan (COZAAR) 25 mg tablet Take 1 tablet (25 mg total) by mouth daily 30 tablet 3   • Magnesium 250 MG TABS Take 1 tablet (250 mg total) by mouth in the morning 90 tablet 1   • Multiple Vitamin (multivitamin) tablet Take 1 tablet by mouth daily     • naltrexone (REVIA) 50 mg tablet Take 1 tablet (50 mg total) by mouth daily 30 tablet 3   • nicotine (NICODERM CQ) 21 mg/24 hr TD 24 hr patch Place 1 patch on the skin every 24 hours 28 patch 0   • predniSONE 20 mg tablet Take 2 tablets (40 mg total) by mouth daily Do not start before May 1, 2023. 3 tablet 0   • Thiamine Mononitrate (VITAMIN B1) 100 mg tablet Take 1 tablet (100 mg total) by mouth daily 90 tablet 1   • traZODone (DESYREL) 50 mg tablet Take 1 tablet (50 mg total) by mouth daily at bedtime as needed for sleep 30 tablet 3     No current facility-administered medications for this visit. No Known Allergies    Review of Systems    Video Exam    There were no vitals filed for this visit. Physical Exam   It was my intent to perform this visit via video technology but the patient was not able to do a video connection so the visit was completed via audio telephone only.       Visit Time  Total Visit Duration: 7

## 2023-07-19 ENCOUNTER — HOSPITAL ENCOUNTER (INPATIENT)
Facility: HOSPITAL | Age: 43
LOS: 3 days | Discharge: HOME | DRG: 897 | End: 2023-07-22
Attending: INTERNAL MEDICINE | Admitting: EMERGENCY MEDICINE
Payer: COMMERCIAL

## 2023-07-19 DIAGNOSIS — F41.9 ANXIETY: ICD-10-CM

## 2023-07-19 DIAGNOSIS — F10.20 ALCOHOL USE DISORDER, SEVERE, DEPENDENCE (HCC): ICD-10-CM

## 2023-07-19 DIAGNOSIS — F10.10 ALCOHOL ABUSE: Primary | ICD-10-CM

## 2023-07-19 LAB
ALBUMIN SERPL BCP-MCNC: 4.4 G/DL (ref 3.5–5)
ALP SERPL-CCNC: 60 U/L (ref 34–104)
ALT SERPL W P-5'-P-CCNC: 45 U/L (ref 7–52)
AMPHETAMINES SERPL QL SCN: NEGATIVE
ANION GAP SERPL CALCULATED.3IONS-SCNC: 13 MMOL/L
AST SERPL W P-5'-P-CCNC: 104 U/L (ref 13–39)
BARBITURATES UR QL: NEGATIVE
BASOPHILS # BLD AUTO: 0.03 THOUSANDS/ÂΜL (ref 0–0.1)
BASOPHILS NFR BLD AUTO: 1 % (ref 0–1)
BENZODIAZ UR QL: NEGATIVE
BILIRUB SERPL-MCNC: 0.53 MG/DL (ref 0.2–1)
BUN SERPL-MCNC: 6 MG/DL (ref 5–25)
CALCIUM SERPL-MCNC: 9.6 MG/DL (ref 8.4–10.2)
CHLORIDE SERPL-SCNC: 101 MMOL/L (ref 96–108)
CO2 SERPL-SCNC: 24 MMOL/L (ref 21–32)
COCAINE UR QL: NEGATIVE
CREAT SERPL-MCNC: 0.88 MG/DL (ref 0.6–1.3)
EOSINOPHIL # BLD AUTO: 0.02 THOUSAND/ÂΜL (ref 0–0.61)
EOSINOPHIL NFR BLD AUTO: 0 % (ref 0–6)
ERYTHROCYTE [DISTWIDTH] IN BLOOD BY AUTOMATED COUNT: 12.8 % (ref 11.6–15.1)
ETHANOL SERPL-MCNC: 395 MG/DL
GFR SERPL CREATININE-BSD FRML MDRD: 105 ML/MIN/1.73SQ M
GLUCOSE SERPL-MCNC: 137 MG/DL (ref 65–140)
HCT VFR BLD AUTO: 39.8 % (ref 36.5–49.3)
HGB BLD-MCNC: 14.6 G/DL (ref 12–17)
IMM GRANULOCYTES # BLD AUTO: 0.01 THOUSAND/UL (ref 0–0.2)
IMM GRANULOCYTES NFR BLD AUTO: 0 % (ref 0–2)
LYMPHOCYTES # BLD AUTO: 2.42 THOUSANDS/ÂΜL (ref 0.6–4.47)
LYMPHOCYTES NFR BLD AUTO: 49 % (ref 14–44)
MAGNESIUM SERPL-MCNC: 1.7 MG/DL (ref 1.9–2.7)
MCH RBC QN AUTO: 34.8 PG (ref 26.8–34.3)
MCHC RBC AUTO-ENTMCNC: 36.7 G/DL (ref 31.4–37.4)
MCV RBC AUTO: 95 FL (ref 82–98)
METHADONE UR QL: NEGATIVE
MONOCYTES # BLD AUTO: 0.51 THOUSAND/ÂΜL (ref 0.17–1.22)
MONOCYTES NFR BLD AUTO: 10 % (ref 4–12)
NEUTROPHILS # BLD AUTO: 1.99 THOUSANDS/ÂΜL (ref 1.85–7.62)
NEUTS SEG NFR BLD AUTO: 40 % (ref 43–75)
NRBC BLD AUTO-RTO: 0 /100 WBCS
OPIATES UR QL SCN: NEGATIVE
OXYCODONE+OXYMORPHONE UR QL SCN: NEGATIVE
PCP UR QL: NEGATIVE
PLATELET # BLD AUTO: 162 THOUSANDS/UL (ref 149–390)
PMV BLD AUTO: 8.5 FL (ref 8.9–12.7)
POTASSIUM SERPL-SCNC: 3 MMOL/L (ref 3.5–5.3)
PROT SERPL-MCNC: 7 G/DL (ref 6.4–8.4)
RBC # BLD AUTO: 4.19 MILLION/UL (ref 3.88–5.62)
SODIUM SERPL-SCNC: 138 MMOL/L (ref 135–147)
THC UR QL: NEGATIVE
WBC # BLD AUTO: 4.98 THOUSAND/UL (ref 4.31–10.16)

## 2023-07-19 PROCEDURE — 93005 ELECTROCARDIOGRAM TRACING: CPT

## 2023-07-19 PROCEDURE — 82077 ASSAY SPEC XCP UR&BREATH IA: CPT

## 2023-07-19 PROCEDURE — 99284 EMERGENCY DEPT VISIT MOD MDM: CPT

## 2023-07-19 PROCEDURE — 99223 1ST HOSP IP/OBS HIGH 75: CPT

## 2023-07-19 PROCEDURE — 99285 EMERGENCY DEPT VISIT HI MDM: CPT

## 2023-07-19 PROCEDURE — 85025 COMPLETE CBC W/AUTO DIFF WBC: CPT

## 2023-07-19 PROCEDURE — 80053 COMPREHEN METABOLIC PANEL: CPT

## 2023-07-19 PROCEDURE — 36415 COLL VENOUS BLD VENIPUNCTURE: CPT

## 2023-07-19 PROCEDURE — 83735 ASSAY OF MAGNESIUM: CPT

## 2023-07-19 PROCEDURE — 80307 DRUG TEST PRSMV CHEM ANLYZR: CPT

## 2023-07-19 RX ORDER — LOSARTAN POTASSIUM 25 MG/1
25 TABLET ORAL DAILY
Status: DISCONTINUED | OUTPATIENT
Start: 2023-07-20 | End: 2023-07-19

## 2023-07-19 RX ORDER — POTASSIUM CHLORIDE 750 MG/1
40 TABLET, EXTENDED RELEASE ORAL ONCE
Status: COMPLETED | OUTPATIENT
Start: 2023-07-19 | End: 2023-07-19

## 2023-07-19 RX ORDER — AMLODIPINE BESYLATE 10 MG/1
10 TABLET ORAL DAILY
Status: DISCONTINUED | OUTPATIENT
Start: 2023-07-20 | End: 2023-07-22 | Stop reason: HOSPADM

## 2023-07-19 RX ORDER — ACETAMINOPHEN 325 MG/1
650 TABLET ORAL EVERY 6 HOURS PRN
Status: DISCONTINUED | OUTPATIENT
Start: 2023-07-19 | End: 2023-07-22 | Stop reason: HOSPADM

## 2023-07-19 RX ORDER — ALBUTEROL SULFATE 90 UG/1
2 AEROSOL, METERED RESPIRATORY (INHALATION) EVERY 6 HOURS PRN
Status: DISCONTINUED | OUTPATIENT
Start: 2023-07-19 | End: 2023-07-22 | Stop reason: HOSPADM

## 2023-07-19 RX ORDER — HYDROXYZINE HYDROCHLORIDE 25 MG/1
25 TABLET, FILM COATED ORAL EVERY 6 HOURS PRN
Status: DISCONTINUED | OUTPATIENT
Start: 2023-07-19 | End: 2023-07-22 | Stop reason: HOSPADM

## 2023-07-19 RX ORDER — POTASSIUM CHLORIDE 14.9 MG/ML
20 INJECTION INTRAVENOUS ONCE
Status: COMPLETED | OUTPATIENT
Start: 2023-07-19 | End: 2023-07-20

## 2023-07-19 RX ORDER — LOSARTAN POTASSIUM 25 MG/1
25 TABLET ORAL DAILY
Status: DISCONTINUED | OUTPATIENT
Start: 2023-07-19 | End: 2023-07-22 | Stop reason: HOSPADM

## 2023-07-19 RX ORDER — TRAZODONE HYDROCHLORIDE 50 MG/1
50 TABLET ORAL
Status: DISCONTINUED | OUTPATIENT
Start: 2023-07-19 | End: 2023-07-22 | Stop reason: HOSPADM

## 2023-07-19 RX ORDER — GUAIFENESIN 600 MG/1
600 TABLET, EXTENDED RELEASE ORAL EVERY 12 HOURS SCHEDULED
Status: DISCONTINUED | OUTPATIENT
Start: 2023-07-19 | End: 2023-07-22 | Stop reason: HOSPADM

## 2023-07-19 RX ORDER — MAGNESIUM SULFATE HEPTAHYDRATE 40 MG/ML
2 INJECTION, SOLUTION INTRAVENOUS ONCE
Status: COMPLETED | OUTPATIENT
Start: 2023-07-19 | End: 2023-07-20

## 2023-07-19 RX ORDER — NICOTINE 21 MG/24HR
1 PATCH, TRANSDERMAL 24 HOURS TRANSDERMAL EVERY 24 HOURS
Status: DISCONTINUED | OUTPATIENT
Start: 2023-07-19 | End: 2023-07-22 | Stop reason: HOSPADM

## 2023-07-19 RX ORDER — ENOXAPARIN SODIUM 100 MG/ML
40 INJECTION SUBCUTANEOUS DAILY
Status: DISCONTINUED | OUTPATIENT
Start: 2023-07-20 | End: 2023-07-22 | Stop reason: HOSPADM

## 2023-07-19 RX ORDER — SODIUM CHLORIDE 9 MG/ML
100 INJECTION, SOLUTION INTRAVENOUS CONTINUOUS
Status: DISCONTINUED | OUTPATIENT
Start: 2023-07-19 | End: 2023-07-20

## 2023-07-19 RX ADMIN — POTASSIUM CHLORIDE 40 MEQ: 750 TABLET, EXTENDED RELEASE ORAL at 19:56

## 2023-07-19 RX ADMIN — TRAZODONE HYDROCHLORIDE 50 MG: 50 TABLET ORAL at 21:30

## 2023-07-19 RX ADMIN — SODIUM CHLORIDE 100 ML/HR: 0.9 INJECTION, SOLUTION INTRAVENOUS at 21:30

## 2023-07-19 RX ADMIN — POTASSIUM CHLORIDE 20 MEQ: 14.9 INJECTION, SOLUTION INTRAVENOUS at 21:30

## 2023-07-19 RX ADMIN — LOSARTAN POTASSIUM 25 MG: 25 TABLET, FILM COATED ORAL at 21:30

## 2023-07-19 RX ADMIN — GUAIFENESIN 600 MG: 600 TABLET, EXTENDED RELEASE ORAL at 21:30

## 2023-07-19 RX ADMIN — HYDROXYZINE HYDROCHLORIDE 25 MG: 25 TABLET ORAL at 21:30

## 2023-07-19 NOTE — ED PROVIDER NOTES
History  Chief Complaint   Patient presents with   • Detox Evaluation     Arrives requesting detox evaluation for alcoholism. Appears intoxicated during triage. Reports daily vodka and beer drinker, but states he does not know the actual quantity per day. Reports he has been drinking every day since a few days after his recent detox admission. No other substances. 42-year-old male with past medical history of alcohol use disorder, hypertension, GERD presents emergency department for detox from alcohol. Been drinking 12-15 shooters of vodka daily for the past 3 months. Last drink was around 3PM, thinks he drank 10 shooters total today. Was on naltrexone, has not been taking for 3 months. History provided by:  Patient  Detox Evaluation  Similar prior episodes: yes    Suspected agents:  Alcohol  Associated symptoms: vomiting (1)    Associated symptoms: no abdominal pain, no confusion, no hallucinations, no headaches, no nausea, no palpitations, no seizures, no shortness of breath, no suicidal ideation, no violence and no weakness    Risk factors: withdrawal syndrome (denies seizures, states symptomatic withdrawal )    Risk factors: no psychiatric hx and no recent infection        Prior to Admission Medications   Prescriptions Last Dose Informant Patient Reported? Taking?    Magnesium 250 MG TABS   No No   Sig: Take 1 tablet (250 mg total) by mouth in the morning   Multiple Vitamin (multivitamin) tablet  Self Yes No   Sig: Take 1 tablet by mouth daily   Thiamine Mononitrate (VITAMIN B1) 100 mg tablet   No No   Sig: Take 1 tablet (100 mg total) by mouth daily   albuterol (Ventolin HFA) 90 mcg/act inhaler   No No   Sig: Inhale 2 puffs every 6 (six) hours as needed for wheezing   amLODIPine (NORVASC) 10 mg tablet   No No   Sig: Take 1 tablet (10 mg total) by mouth daily   amoxicillin (AMOXIL) 875 mg tablet   No No   Sig: Take 1 tablet (875 mg total) by mouth 2 (two) times a day for 7 days   folic acid (KP Folic Acid) 1 mg tablet   No No   Sig: Take 1 tablet (1 mg total) by mouth daily   hydrOXYzine HCL (ATARAX) 50 mg tablet   No No   Sig: Take 1 tablet (50 mg total) by mouth every 6 (six) hours as needed for anxiety   losartan (COZAAR) 25 mg tablet   No No   Sig: Take 1 tablet (25 mg total) by mouth daily   naltrexone (REVIA) 50 mg tablet   No No   Sig: Take 1 tablet (50 mg total) by mouth daily   nicotine (NICODERM CQ) 21 mg/24 hr TD 24 hr patch   No No   Sig: Place 1 patch on the skin every 24 hours   predniSONE 20 mg tablet   No No   Sig: Take 2 tablets (40 mg total) by mouth daily Do not start before May 1, 2023. traZODone (DESYREL) 50 mg tablet   No No   Sig: Take 1 tablet (50 mg total) by mouth daily at bedtime as needed for sleep      Facility-Administered Medications: None       Past Medical History:   Diagnosis Date   • GERD (gastroesophageal reflux disease)    • Hypertension    • Rectal bleed        Past Surgical History:   Procedure Laterality Date   • EYE SURGERY     • SHOULDER SURGERY Right 12/08/2022       Family History   Problem Relation Age of Onset   • Hypertension Father    • Colon cancer Paternal Aunt      I have reviewed and agree with the history as documented. E-Cigarette/Vaping   • E-Cigarette Use Never User      E-Cigarette/Vaping Substances   • Nicotine Yes    • THC No    • CBD No    • Flavoring No    • Other No    • Unknown No      Social History     Tobacco Use   • Smoking status: Some Days     Packs/day: 1.00     Types: Cigarettes   • Smokeless tobacco: Never   Vaping Use   • Vaping Use: Never used   Substance Use Topics   • Alcohol use: Yes     Comment: 10-12 shots of vodka and 4-6 beers   • Drug use: Never       Review of Systems   Constitutional: Negative for chills and fever. Respiratory: Negative for shortness of breath. Cardiovascular: Negative for chest pain and palpitations. Gastrointestinal: Positive for vomiting (1).  Negative for abdominal distention, abdominal pain, blood in stool and nausea. Genitourinary: Negative for decreased urine volume and dysuria. Musculoskeletal: Negative for back pain. Skin: Negative for color change and rash. Neurological: Negative for seizures, weakness and headaches. Psychiatric/Behavioral: Negative for confusion, hallucinations and suicidal ideas. All other systems reviewed and are negative. Physical Exam  Physical Exam  Vitals and nursing note reviewed. Constitutional:       General: He is awake. He is not in acute distress. Appearance: Normal appearance. He is not ill-appearing, toxic-appearing or diaphoretic. HENT:      Head: Normocephalic. Mouth/Throat:      Lips: Pink. Mouth: Mucous membranes are moist.   Eyes:      General: Vision grossly intact. No scleral icterus. Extraocular Movements: Extraocular movements intact. Conjunctiva/sclera: Conjunctivae normal.      Pupils: Pupils are equal, round, and reactive to light. Cardiovascular:      Rate and Rhythm: Normal rate and regular rhythm. Heart sounds: Normal heart sounds. Pulmonary:      Effort: Pulmonary effort is normal. No respiratory distress. Breath sounds: Normal breath sounds. Abdominal:      General: There is no distension. Palpations: Abdomen is soft. Tenderness: There is no abdominal tenderness. Skin:     General: Skin is warm and dry. Capillary Refill: Capillary refill takes less than 2 seconds. Coloration: Skin is not jaundiced. Neurological:      Mental Status: He is alert and oriented to person, place, and time. Comments: No tremor.          Vital Signs  ED Triage Vitals [07/19/23 1800]   Temperature Pulse Respirations Blood Pressure SpO2   97.8 °F (36.6 °C) (!) 108 20 (!) 175/108 95 %      Temp Source Heart Rate Source Patient Position - Orthostatic VS BP Location FiO2 (%)   Tympanic Monitor Sitting Left arm --      Pain Score       --           Vitals:    07/19/23 1800   BP: (!) 175/108 Pulse: (!) 108   Patient Position - Orthostatic VS: Sitting         Visual Acuity      ED Medications  Medications   potassium chloride (K-DUR,KLOR-CON) CR tablet 40 mEq (has no administration in time range)       Diagnostic Studies  Results Reviewed     Procedure Component Value Units Date/Time    Rapid drug screen, urine [[id number]]  (Normal) Collected: 07/19/23 1828    Lab Status: Final result Specimen: Urine, Clean Catch Updated: 07/19/23 1855     Amph/Meth UR Negative     Barbiturate Ur Negative     Benzodiazepine Urine Negative     Cocaine Urine Negative     Methadone Urine Negative     Opiate Urine Negative     PCP Ur Negative     THC Urine Negative     Oxycodone Urine Negative    Narrative:      FOR MEDICAL PURPOSES ONLY. IF CONFIRMATION NEEDED PLEASE CONTACT THE LAB WITHIN 5 DAYS.     Drug Screen Cutoff Levels:  AMPHETAMINE/METHAMPHETAMINES  1000 ng/mL  BARBITURATES     200 ng/mL  BENZODIAZEPINES     200 ng/mL  COCAINE      300 ng/mL  METHADONE      300 ng/mL  OPIATES      300 ng/mL  PHENCYCLIDINE     25 ng/mL  THC       50 ng/mL  OXYCODONE      100 ng/mL    Comprehensive metabolic panel [[id number]]  (Abnormal) Collected: 07/19/23 1814    Lab Status: Final result Specimen: Blood from Arm, Left Updated: 07/19/23 1841     Sodium 138 mmol/L      Potassium 3.0 mmol/L      Chloride 101 mmol/L      CO2 24 mmol/L      ANION GAP 13 mmol/L      BUN 6 mg/dL      Creatinine 0.88 mg/dL      Glucose 137 mg/dL      Calcium 9.6 mg/dL       U/L      ALT 45 U/L      Alkaline Phosphatase 60 U/L      Total Protein 7.0 g/dL      Albumin 4.4 g/dL      Total Bilirubin 0.53 mg/dL      eGFR 105 ml/min/1.73sq m     Narrative:      [place] guidelines for Chronic Kidney Disease (CKD):   •  Stage 1 with normal or high GFR (GFR > 90 mL/min/1.73 square meters)  •  Stage 2 Mild CKD (GFR = 60-89 mL/min/1.73 square meters)  •  Stage 3A Moderate CKD (GFR = 45-59 mL/min/1.73 square meters)  •  Stage 3B Moderate CKD (GFR = 30-44 mL/min/1.73 square meters)  •  Stage 4 Severe CKD (GFR = 15-29 mL/min/1.73 square meters)  •  Stage 5 End Stage CKD (GFR <15 mL/min/1.73 square meters)  Note: GFR calculation is accurate only with a steady state creatinine    Magnesium [[id number]]  (Abnormal) Collected: 07/19/23 1814    Lab Status: Final result Specimen: Blood from Arm, Left Updated: 07/19/23 1841     Magnesium 1.7 mg/dL     Ethanol [[id number]]  (Abnormal) Collected: 07/19/23 1814    Lab Status: Final result Specimen: Blood from Arm, Left Updated: 07/19/23 1838     Ethanol Lvl 395 mg/dL     CBC and differential [[id number]]  (Abnormal) Collected: 07/19/23 1814    Lab Status: Final result Specimen: Blood from Arm, Left Updated: 07/19/23 1829     WBC 4.98 Thousand/uL      RBC 4.19 Million/uL      Hemoglobin 14.6 g/dL      Hematocrit 39.8 %      MCV 95 fL      MCH 34.8 pg      MCHC 36.7 g/dL      RDW 12.8 %      MPV 8.5 fL      Platelets 594 Thousands/uL      nRBC 0 /100 WBCs      Neutrophils Relative 40 %      Immat GRANS % 0 %      Lymphocytes Relative 49 %      Monocytes Relative 10 %      Eosinophils Relative 0 %      Basophils Relative 1 %      Neutrophils Absolute 1.99 Thousands/µL      Immature Grans Absolute 0.01 Thousand/uL      Lymphocytes Absolute 2.42 Thousands/µL      Monocytes Absolute 0.51 Thousand/µL      Eosinophils Absolute 0.02 Thousand/µL      Basophils Absolute 0.03 Thousands/µL                  No orders to display              Procedures  Procedures         ED Course  ED Course as of 07/19/23 1903 Wed Jul 19, 2023   1835 Procedure Note: EKG  Date/Time: 07/19/23 6:35 PM   Performed by: Richie Oconnor   Authorized by: Richie Oconnor  ECG interpreted by me, the ED Provider: yes   The EKG demonstrates:  Rate 102 bpm  Rhythm Sinus tachycardia   QTc 437 ms   No ST elevations/depressions                                 SBIRT 20yo+    Flowsheet Row Most Recent Value   Initial Alcohol Screen: US AUDIT-C     1.  How often do you have a drink containing alcohol? 6 Filed at: 07/19/2023 1801   2. How many drinks containing alcohol do you have on a typical day you are drinking? 6 Filed at: 07/19/2023 1801   3a. Male UNDER 65: How often do you have five or more drinks on one occasion? 6 Filed at: 07/19/2023 1801   Audit-C Score 18 Filed at: 07/19/2023 1801   Full Alcohol Screen: US AUDIT    4. How often during the last year have you found that you were not able to stop drinking once you had started? 4 Filed at: 07/19/2023 1801   5. How often during past year have you failed to do what was normally expected of you because of drinking? 4 Filed at: 07/19/2023 1801   6. How often in past year have you needed a first drink in the morning to get yourself going after a heavy drinking session? 4 Filed at: 07/19/2023 1801   7. How often in past year have you had feeling of guilt or remorse after drinking? 4 Filed at: 07/19/2023 1801   8. How often in past year have you been unable to remember what happened night before because you had been drinking? 4 Filed at: 07/19/2023 1801   9. Have you or someone else been injured as a result of your drinking? 0 Filed at: 07/19/2023 1801   10. Has a relative, friend, doctor or other health worker been concerned about your drinking and suggested you cut down? 4 Filed at: 07/19/2023 1801   AUDIT Total Score 42 Filed at: 07/19/2023 1801   KM: How many times in the past year have you. .. Used an illegal drug or used a prescription medication for non-medical reasons? Never Filed at: 07/19/2023 1801                    Medical Decision Making  Patient presenting for detox evaluation from alcohol. Denies other drug use. Denies SI or HI. Reports episode of vomiting with drinking. Denies acute complaints including abdominal pain. benign, reassuring abdominal exam without tenderness or distention. Patient alert and oriented x4. Denies any acute withdrawal symptoms.   ECG without acute ischemic changes or dysrhythmia. Admits to alcohol use prior to arrival.  Discussed with detox [facility], accepted to detox unit. All imaging and/or lab testing discussed with patient. Patient and/or family members verbalizes understanding and agrees with plan for admission. Patient is stable for admission.     Portions of the record may have been created with voice recognition software. Occasional wrong word or "sound a like" substitutions may have occurred due to the inherent limitations of voice recognition software. Read the chart carefully and recognize, using context, where substitutions have occurred. Amount and/or Complexity of Data Reviewed  Labs: ordered. Risk  Prescription drug management. Decision regarding hospitalization. Disposition  Final diagnoses:   Alcohol abuse     Time reflects when diagnosis was documented in both MDM as applicable and the Disposition within this note     Time User Action Codes Description Comment    7/19/2023  7:00 PM Margoth Bills Add [F10.10] Alcohol abuse       ED Disposition     ED Disposition   Admit    Condition   Stable    Date/Time   Wed Jul 19, 2023  7:01 PM    Comment   Case was discussed with [facility] and the patient's admission status was agreed to be Admission Status: inpatient status to the service of Dr. Keyon Marin . Follow-up Information    None         Patient's Medications   Discharge Prescriptions    No medications on file       No discharge procedures on file.     PDMP Review       Value Time User    PDMP Reviewed  Yes 4/28/2023  9:04 AM Sharifa Anderson DO          ED Provider  Electronically Signed by           Mya Diallo PA-C  07/19/23 2301

## 2023-07-20 LAB
ALBUMIN SERPL BCP-MCNC: 4 G/DL (ref 3.5–5)
ALP SERPL-CCNC: 52 U/L (ref 34–104)
ALT SERPL W P-5'-P-CCNC: 40 U/L (ref 7–52)
ANION GAP SERPL CALCULATED.3IONS-SCNC: 10 MMOL/L
AST SERPL W P-5'-P-CCNC: 83 U/L (ref 13–39)
ATRIAL RATE: 102 BPM
BILIRUB SERPL-MCNC: 0.52 MG/DL (ref 0.2–1)
BUN SERPL-MCNC: 7 MG/DL (ref 5–25)
CALCIUM SERPL-MCNC: 8.4 MG/DL (ref 8.4–10.2)
CHLORIDE SERPL-SCNC: 107 MMOL/L (ref 96–108)
CO2 SERPL-SCNC: 26 MMOL/L (ref 21–32)
CREAT SERPL-MCNC: 0.94 MG/DL (ref 0.6–1.3)
ERYTHROCYTE [DISTWIDTH] IN BLOOD BY AUTOMATED COUNT: 13.2 % (ref 11.6–15.1)
GFR SERPL CREATININE-BSD FRML MDRD: 99 ML/MIN/1.73SQ M
GLUCOSE SERPL-MCNC: 74 MG/DL (ref 65–140)
HCT VFR BLD AUTO: 40.4 % (ref 36.5–49.3)
HGB BLD-MCNC: 13.5 G/DL (ref 12–17)
MAGNESIUM SERPL-MCNC: 2 MG/DL (ref 1.9–2.7)
MCH RBC QN AUTO: 32.8 PG (ref 26.8–34.3)
MCHC RBC AUTO-ENTMCNC: 33.4 G/DL (ref 31.4–37.4)
MCV RBC AUTO: 98 FL (ref 82–98)
P AXIS: 61 DEGREES
PLATELET # BLD AUTO: 166 THOUSANDS/UL (ref 149–390)
PMV BLD AUTO: 8.7 FL (ref 8.9–12.7)
POTASSIUM SERPL-SCNC: 3.8 MMOL/L (ref 3.5–5.3)
PR INTERVAL: 170 MS
PROT SERPL-MCNC: 6.5 G/DL (ref 6.4–8.4)
QRS AXIS: -4 DEGREES
QRSD INTERVAL: 102 MS
QT INTERVAL: 336 MS
QTC INTERVAL: 437 MS
RBC # BLD AUTO: 4.12 MILLION/UL (ref 3.88–5.62)
SODIUM SERPL-SCNC: 143 MMOL/L (ref 135–147)
T WAVE AXIS: 66 DEGREES
VENTRICULAR RATE: 102 BPM
WBC # BLD AUTO: 3.73 THOUSAND/UL (ref 4.31–10.16)

## 2023-07-20 PROCEDURE — 83735 ASSAY OF MAGNESIUM: CPT

## 2023-07-20 PROCEDURE — 99233 SBSQ HOSP IP/OBS HIGH 50: CPT | Performed by: EMERGENCY MEDICINE

## 2023-07-20 PROCEDURE — 85027 COMPLETE CBC AUTOMATED: CPT

## 2023-07-20 PROCEDURE — 80053 COMPREHEN METABOLIC PANEL: CPT

## 2023-07-20 PROCEDURE — 93010 ELECTROCARDIOGRAM REPORT: CPT

## 2023-07-20 RX ORDER — DIAZEPAM 5 MG/ML
10 INJECTION, SOLUTION INTRAMUSCULAR; INTRAVENOUS ONCE
Status: COMPLETED | OUTPATIENT
Start: 2023-07-20 | End: 2023-07-20

## 2023-07-20 RX ORDER — PHENOBARBITAL SODIUM 130 MG/ML
130 INJECTION INTRAMUSCULAR ONCE
Status: COMPLETED | OUTPATIENT
Start: 2023-07-20 | End: 2023-07-20

## 2023-07-20 RX ORDER — PHENOBARBITAL 64.8 MG/1
64.8 TABLET ORAL ONCE
Status: COMPLETED | OUTPATIENT
Start: 2023-07-20 | End: 2023-07-20

## 2023-07-20 RX ADMIN — GUAIFENESIN 600 MG: 600 TABLET, EXTENDED RELEASE ORAL at 20:45

## 2023-07-20 RX ADMIN — ALBUTEROL SULFATE 2 PUFF: 90 AEROSOL, METERED RESPIRATORY (INHALATION) at 08:05

## 2023-07-20 RX ADMIN — NICOTINE POLACRILEX 2 MG: 2 GUM, CHEWING BUCCAL at 17:07

## 2023-07-20 RX ADMIN — LOSARTAN POTASSIUM 25 MG: 25 TABLET, FILM COATED ORAL at 08:05

## 2023-07-20 RX ADMIN — DIAZEPAM 10 MG: 10 INJECTION, SOLUTION INTRAMUSCULAR; INTRAVENOUS at 10:15

## 2023-07-20 RX ADMIN — PHENOBARBITAL 64.8 MG: 64.8 TABLET ORAL at 12:05

## 2023-07-20 RX ADMIN — AMLODIPINE BESYLATE 10 MG: 10 TABLET ORAL at 08:05

## 2023-07-20 RX ADMIN — TRAZODONE HYDROCHLORIDE 50 MG: 50 TABLET ORAL at 20:46

## 2023-07-20 RX ADMIN — PHENOBARBITAL SODIUM 130 MG: 130 INJECTION INTRAMUSCULAR at 15:56

## 2023-07-20 RX ADMIN — SODIUM CHLORIDE 100 ML/HR: 0.9 INJECTION, SOLUTION INTRAVENOUS at 07:49

## 2023-07-20 RX ADMIN — NICOTINE POLACRILEX 2 MG: 2 GUM, CHEWING BUCCAL at 11:57

## 2023-07-20 RX ADMIN — Medication 650 MG: at 10:14

## 2023-07-20 RX ADMIN — NICOTINE POLACRILEX 2 MG: 2 GUM, CHEWING BUCCAL at 20:45

## 2023-07-20 RX ADMIN — PHENOBARBITAL 64.8 MG: 64.8 TABLET ORAL at 17:06

## 2023-07-20 RX ADMIN — GUAIFENESIN 600 MG: 600 TABLET, EXTENDED RELEASE ORAL at 08:05

## 2023-07-20 RX ADMIN — FOLIC ACID: 5 INJECTION, SOLUTION INTRAMUSCULAR; INTRAVENOUS; SUBCUTANEOUS at 08:05

## 2023-07-20 RX ADMIN — HYDROXYZINE HYDROCHLORIDE 25 MG: 25 TABLET ORAL at 18:14

## 2023-07-20 RX ADMIN — MAGNESIUM SULFATE 2 G: 2 INJECTION INTRAVENOUS at 00:00

## 2023-07-20 NOTE — PROGRESS NOTES
PROGRESS NOTE  DEPARTMENT OF MEDICAL TOXICOLOGY  LEVEL 4 MEDICAL DETOX UNIT  Kendell Patrick 43 y.o. male MRN: [id number]  Unit/Bed#: 5T [facility] 512-01 Encounter: [id number]      Reason for Admission/Principal Problem: Alcohol withdrawal with complication    Rounding Provider: Alisa Pleitez MD  Attending Provider: Alisa Pleitez*   7/19/2023  5:59 PM     * Alcohol withdrawal syndrome with complication ([address])  Assessment & Plan  · Continue SEWS protocol with symptom-triggered, as needed phenobarbital      Alcohol use disorder, severe, dependence ([address])  Assessment & Plan  · Continue vitamin supplementation  · Case management consulted for disposition planning  · Patient may be interested in naltrexone prior to discharge; states he experienced sexual dysfunction after discharge last admission with new medications of naltrexone and hydroxyzine that improved after medication cessation. Transaminitis  Assessment & Plan  Recent Labs     07/19/23  1814 07/20/23  0543   * 83*   ALT 45 40   ALKPHOS 60 52       · Improving; likely secondary to chronic alcohol use  · Encourage alcohol cessation    Hypomagnesemia  Assessment & Plan  Recent Labs     07/19/23  1814 07/20/23  0543   MG 1.7* 2.0     · Normalized  · Will continue to optimize as needed    Hypokalemia  Assessment & Plan  Recent Labs     07/19/23  1814 07/20/23  0543   K 3.0* 3.8     · Will continue to optimize as needed    Primary hypertension  Assessment & Plan  · Home blood pressure medications continued upon admission    Mild intermittent asthma without complication  Assessment & Plan  · Albuterol as needed  · Mucinex started  · Encouraged tobacco cessation    Tobacco use disorder  Assessment & Plan  · Encouraged cessation  · Nicotine replacement therapy        VTE Pharmacologic Prophylaxis:   Pharmacologic: Enoxaparin (Lovenox)  Mechanical VTE Prophylaxis in Place: yes    Code Status: Level 1 - Full Code    Patient Centered Rounds:  I have performed bedside rounds with nursing staff today. Discussions with Specialists or Other Care Team Provider: Case management, nursing     Education and Discussions with Family / Patient: patient    Time Spent for Care: 45 minutes. More than 50% of total time spent on counseling and coordination of care as described above. Current Length of Stay: 1 day(s)    Current Patient Status: Inpatient     Certification Statement: The patient will continue to require additional inpatient hospital stay due to alcohol withdrawal management, disposition planning, electrolyte optimization Discharge Plan: Case management consulted for disposition planning    Time spent for care: 35 minutes. More than 50% of total time spent on counseling and coordination of care described above.       Subjective:   Patient endorsing tremor, anxiety, nausea this morning; received phenobarbital with some improvement    Objective:     Clinical Opiate Withdrawal Scale  Pulse: 94    SEWS Total Score: 6 (7/20/2023 11:57 AM)        Last 24 Hours Medication List:   Current Facility-Administered Medications   Medication Dose Route Frequency Provider Last Rate   • acetaminophen  650 mg Oral Q6H PRN Sidra Fuller PA-C     • albuterol  2 puff Inhalation Q6H PRN Maciel Mc PA-C     • amLODIPine  10 mg Oral Daily Maciel Mc PA-C     • enoxaparin  40 mg Subcutaneous Daily Sidra Fuller PA-C     • folic acid 1 mg, thiamine (VITAMIN B1) 100 mg in sodium chloride 0.9 % 100 mL IV piggyback   Intravenous Daily Sidra Fuller PA-C     • guaiFENesin  600 mg Oral Q12H [facility] Maciel Mc PA-C     • hydrOXYzine HCL  25 mg Oral Q6H PRN Maciel JOSE Mc     • losartan  25 mg Oral Daily Maciel Mc PA-C     • nicotine  1 patch Transdermal Q24H Maciel Mc PA-C     • nicotine polacrilex  2 mg Oral Q2H PRN Meghana Astorga MD     • traZODone  50 mg Oral HS PRN Sidra Fuller PA-C           Vitals:   Kay Tirado (24hrs), Av °F (36.7 °C), Min:97.6 °F (36.4 °C), Max:98.4 °F (36.9 °C)    Temp:  [97.6 °F (36.4 °C)-98.4 °F (36.9 °C)] 98.4 °F (36.9 °C)  HR:  [] 94  Resp:  [18-20] 18  BP: (121-175)/() 145/97  SpO2:  [94 %-98 %] 96 %  Body mass index is 23.65 kg/m². Input and Output Summary (last 24 hours):No intake or output data in the 24 hours ending 23 1306    Physical Exam:   Physical Exam  Vitals and nursing note reviewed. Constitutional:       General: He is not in acute distress. Appearance: Normal appearance. He is not ill-appearing, toxic-appearing or diaphoretic. HENT:      Head: Normocephalic and atraumatic. Nose: Nose normal.      Mouth/Throat:      Mouth: Mucous membranes are moist.   Eyes:      General: No scleral icterus. Right eye: No discharge. Left eye: No discharge. Conjunctiva/sclera: Conjunctivae normal.   Cardiovascular:      Rate and Rhythm: Normal rate and regular rhythm. Pulmonary:      Effort: Pulmonary effort is normal. No respiratory distress. Breath sounds: No wheezing, rhonchi or rales. Abdominal:      General: There is no distension. Palpations: Abdomen is soft. Tenderness: There is no abdominal tenderness. There is no guarding or rebound. Musculoskeletal:         General: No swelling or deformity. Cervical back: Neck supple. Skin:     General: Skin is warm and dry. Findings: No rash. Neurological:      General: No focal deficit present. Mental Status: He is alert and oriented to person, place, and time.       Comments: No tongue fasciculations; bilateral intention tremor   Psychiatric:         Mood and Affect: Mood normal.         Behavior: Behavior normal.         Additional Data:     Labs:   Results from last 7 days   Lab Units 23  0543 23  1814   WBC Thousand/uL 3.73* 4.98   HEMOGLOBIN g/dL 13.5 14.6   HEMATOCRIT % 40.4 39.8   PLATELETS Thousands/uL 166 162   NEUTROS PCT %  --  40* LYMPHS PCT %  --  49*   MONOS PCT %  --  10   EOS PCT %  --  0      Results from last 7 days   Lab Units 07/20/23  0543   SODIUM mmol/L 143   POTASSIUM mmol/L 3.8   CHLORIDE mmol/L 107   CO2 mmol/L 26   BUN mg/dL 7   CREATININE mg/dL 0.94   ANION GAP mmol/L 10   CALCIUM mg/dL 8.4   ALBUMIN g/dL 4.0   TOTAL BILIRUBIN mg/dL 0.52   ALK PHOS U/L 52   ALT U/L 40   AST U/L 83*   GLUCOSE RANDOM mg/dL 74                              * I Have Reviewed All Lab Data Listed Above. * Additional Pertinent Lab Tests Reviewed: All Labs Within Last 24 Hours Reviewed      Imaging Studies: n/a      Recent Cultures (last 7 days): Today, Patient Was Seen By: Trenton Yao MD    ** Please Note: Dictation voice to text software may have been used in the creation of this document.  **

## 2023-07-20 NOTE — UTILIZATION REVIEW
NOTIFICATION OF INPATIENT ADMISSION   AUTHORIZATION REQUEST   SERVICING FACILITY:   [address] [place] [address]  Tax ID: [id number]  NPI: [id number] ATTENDING PROVIDER:  Attending Name and NPI#: Natalya Gomez Md [[id number]]  Address: [address] [place] [address]  Phone: [phone]     ADMISSION INFORMATION:  [facility] of [facility] [address] [facility]   Place of Service Code: 21  Inpatient Admission Date/Time: 7/19/23  7:01 PM  Discharge Date/Time: No discharge date for patient encounter. Admitting Diagnosis Code/Description:  Alcohol abuse [F10.10]  Alcohol use disorder, severe, dependence ([address]) [F10.20]  Withdrawn from alcohol detoxification program [Z78.9]     UTILIZATION REVIEW CONTACT:  Jose Juan Hernandez Utilization   Network Utilization Review Department  Phone: [phone]  Fax [phone]  Email: [email or web address]  Contact for approvals/pending authorizations, clinical reviews, and discharge. PHYSICIAN ADVISORY SERVICES:  Medical Necessity Denial & [employer] Review  Phone: [phone]  Fax: [phone]  Email: [email or web address]

## 2023-07-20 NOTE — NURSING NOTE
Patient's BP elevated, provider made aware. See orders for home meds restarted. Per provider no SEWS or phenobarbital d/t patient's blood alcohol level.

## 2023-07-20 NOTE — ASSESSMENT & PLAN NOTE
Recent Labs     07/19/23  1814 07/20/23  0543   * 83*   ALT 45 40   ALKPHOS 60 52       · Improving; likely secondary to chronic alcohol use  · Encourage alcohol cessation

## 2023-07-20 NOTE — PLAN OF CARE
Problem: Potential for Falls  Goal: Patient will remain free of falls  Description: INTERVENTIONS:  - Educate patient/family on patient safety including physical limitations  - Instruct patient to call for assistance with activity   - Consult OT/PT to assist with strengthening/mobility   - Keep Call bell within reach  - Keep bed low and locked with side rails adjusted as appropriate  - Keep care items and personal belongings within reach  - Initiate and maintain comfort rounds  - Make Fall Risk Sign visible to staff  - Offer Toileting every 2 Hours, in advance of need  - Initiate/Maintain bed alarm  - Obtain necessary fall risk management equipment: alarm   - Apply yellow socks and bracelet for high fall risk patients  - Consider moving patient to room near nurses station  Outcome: Progressing     Problem: PAIN - ADULT  Goal: Verbalizes/displays adequate comfort level or baseline comfort level  Description: Interventions:  - Encourage patient to monitor pain and request assistance  - Assess pain using appropriate pain scale  - Administer analgesics based on type and severity of pain and evaluate response  - Implement non-pharmacological measures as appropriate and evaluate response  - Consider cultural and social influences on pain and pain management  - Notify physician/advanced practitioner if interventions unsuccessful or patient reports new pain  Outcome: Progressing     Problem: INFECTION - ADULT  Goal: Absence or prevention of progression during hospitalization  Description: INTERVENTIONS:  - Assess and monitor for signs and symptoms of infection  - Monitor lab/diagnostic results  - Monitor all insertion sites, i.e. indwelling lines, tubes, and drains  - Monitor endotracheal if appropriate and nasal secretions for changes in amount and color  - Rena Lara appropriate cooling/warming therapies per order  - Administer medications as ordered  - Instruct and encourage patient and family to use good hand hygiene technique  - Identify and instruct in appropriate isolation precautions for identified infection/condition  Outcome: Progressing  Goal: Absence of fever/infection during neutropenic period  Description: INTERVENTIONS:  - Monitor WBC    Outcome: Progressing     Problem: DISCHARGE PLANNING  Goal: Discharge to home or other facility with appropriate resources  Description: INTERVENTIONS:  - Identify barriers to discharge w/patient and caregiver  - Arrange for needed discharge resources and transportation as appropriate  - Identify discharge learning needs (meds, wound care, etc.)  - Arrange for interpretive services to assist at discharge as needed  - Refer to Case Management Department for coordinating discharge planning if the patient needs post-hospital services based on physician/advanced practitioner order or complex needs related to functional status, cognitive ability, or social support system  Outcome: Progressing     Problem: Knowledge Deficit  Goal: Patient/family/caregiver demonstrates understanding of disease process, treatment plan, medications, and discharge instructions  Description: Complete learning assessment and assess knowledge base.   Interventions:  - Provide teaching at level of understanding  - Provide teaching via preferred learning methods  Outcome: Progressing     Problem: SUBSTANCE USE/ABUSE  Goal: By discharge, will develop insight into their chemical dependency and sustain motivation to continue in recovery  Description: INTERVENTIONS:  - Attends all daily group sessions and scheduled AA groups  - Actively practices coping skills through participation in the therapeutic community and adherence to program rules  - Reviews and completes assignments from individual treatment plan  - Assist patient development of understanding of their personal cycle of addiction and relapse triggers  Outcome: Progressing  Goal: By discharge, patient will have ongoing treatment plan addressing chemical dependency  Description: INTERVENTIONS:  - Assist patient with resources and/or appointments for ongoing recovery based living  Outcome: Progressing

## 2023-07-20 NOTE — PROGRESS NOTES
07/20/23 1236   Referral Data   Referral Source Patient   Referral Name [address] [facility]   Referral Reason Drug/Alcohol [address]   Readmission Root Cause   30 Day Readmission No   Patient Information   Mental Status Alert   Primary Caregiver Self   Support System Immediate family   Quaker/Cultural Requests n/a   Legal Information   Legal Issues pt denies current   Activities of Daily Living Prior to Admission   Functional Status Independent   Assistive Device No device needed   Living Arrangement House;Lives with someone   Ambulation Independent   Access to Firearms   Access to Firearms No  (pt denies)   Income [address] of Transportation   Means of Transport to Appts: Drives Self        23/88/04 1238   Substance Abuse Addendum Details   History of Withdrawal Symptoms Other withdrawal symptoms (specify in comment)  (tremors)   Medical Complications hypertension   Sober Supports family   Present Treatment none other than current   Substance Abuse Treatment Hx Past Tx, Outpatient; Past detox   Stage of Change   Stage of Change Contemplation     Additional Substance Use Detail    Questions Responses   Problems Due to Past Use of Alcohol? Yes   Problems Due to Past Use of Substances? No   Substance Use Assessment Denies substance use within the past 12 months   Alcohol Use Frequency Daily   Alcohol Drink of Choice beer/alcohol   1st Use of Alcohol 15   Last Use of Alcohol & Amount 7/19/2023  14 shots vodka   Longest Abstinence from Alcohol 2-3 days     Pt is a 43year old male who was admitted to detox for alcohol withdrawal.  Pt had self presented at [facility] ED with spouse requesting detox. Pt's name, date of birth, home address, and telephone number were verified. Pt was informed of case management role and the purpose of the completion of intake with case management.  Pt reports address listed is his mailing address and he and his family reside in [place], [place]. Pt presented as cooperative during intake. Pt had previously been on the detox unit 4/2023. Pt states he attended outpatient [facility] treatment after detox and had remained abstinent up until 1 month ago. Pt reports he is currently drinking between 14-15 shots of vodka daily  Pt reports first use at age 13 and last use 7/19/2023 of 14 shots of vodka. Pt reports daily nicotine use of 1 PPD cigarette smoking. Cm offered referral to smoking/nicotine cessation program. Pt states no interest in stopping cigarette smoking. Pt reports the previous 1 month as his longest abstinence and his outpatient [facility] treatment as his only [facility] treatment. Pt denied any previous 12 step meeting attendance. Pt reports current and only withdrawal symptoms tremors and denied any hx of withdrawal seizures, hallucinations, or Dt's. Pt reports very infrequent black out alcohol use. Pt reports a family hx of alcohol abuse.       Ethanol lvl in ED: 395  UDS: negative for all     Pt reported current treatment with a psychologist for trauma related issues. Pt stated he was uncertain of the contact for this psychologist and his spouse managed this information. Pt denied any SI or HI or hx of, pt denied any AH/VH, pt reports  hx of abuse or trauma but did not specify. Pt requested a psychiatric consult to address his mental health needs. Cm informed medical of this request. Pt requested connection with a psychiatrist for aftercare needs and signed BETSY's for both [facility], [facility] and [facility], both were contacted and pt was scheduled an intake with Paolo on 7/26/2023. Pt deniedany recent losses. Pt denied any access to firearms and denied any knowledge of family hx of mental health needs.     Pt has current chronic medical conditions of hypertension. Pt signed an BETSY for CARLOS EDUARDO ROBLERO, PCP. This office was contacted at [phone] and this office was informed of pt's admission.  Pt has current health insurance of [employer], BETSY signed, and preferred pharmacy of [facility].     Pt denied any current legal issues but reports a hx of DUI about 1.5 years ago.     Pt reports he is employed full time but is out on medical leave dur to shoulder injury. Pt reports completing an associates degree. Pt reports he has a car and a license. Pt denied any  service and denied any religous or cultural needs.      Pt reports he resides with his spouse and  5 children at [address] [place]. Pt states his spouse is caring for the children at this time. Pt signed an BESTY for [employer], [place], and she was contacted at [phone]. Roel Manzano indicated she was supportive of pt's seeking detox and stated she was in support of pt entering outpatient [facility] treatment upon discharge from detox. Pavithra and cm agreed to further discuss outpatient options. Pt denied any housing or food insecurities and reports his spouse can provide transportation home.     Pt and Cm completed relapse prevention plan. Pt and Cm signed plan and pt received a copy of this plan. Pt struggled to identify triggers and coping skill. Pt only identified his spouse as a support. Cm asked pt about possible admission to inpatient [facility] treatment and pt stated he was not interested. Pt stated he felt he required psychiatric need and would continue with his psychologist.  Pt's clinical presentation indicates pt has not developed any relapse prevention skills even after outpatient [facility] treatment and would benefit from inpatient treatment. Pt's barriers present as pt's lack of community support. Pt's goals for detox are to successfully complete medical withdrawal and to develop a discharge plan that includes relapse prevention. Pt presents in the contemplation stage of change.

## 2023-07-20 NOTE — ASSESSMENT & PLAN NOTE
Recent Labs     07/19/23  1814 07/20/23  0543 07/21/23  0531 07/22/23  0545   K 3.0* 3.8 3.1* 3.6     · Will continue to optimize as needed

## 2023-07-20 NOTE — UTILIZATION REVIEW
Initial Clinical Review      Admission: Date/Time/Statement:   Admission Orders (From admission, onward)     Ordered        07/19/23 1901  INPATIENT ADMISSION  Once                      Orders Placed This Encounter   Procedures   • INPATIENT ADMISSION     Standing Status:   Standing     Number of Occurrences:   1     Order Specific Question:   Level of Care     Answer:   Med Surg [16]     Order Specific Question:   Estimated length of stay     Answer:   Not Applicable     ED Arrival Information     Expected   -    Arrival   7/19/2023 17:29    Acuity   Emergent            Means of arrival   Walk-In    Escorted by   [facility] Toxicology    Admission type   Emergency            Arrival complaint   detox eval            Chief Complaint   Patient presents with   • Detox Evaluation     Arrives requesting detox evaluation for alcoholism. Appears intoxicated during triage. Reports daily vodka and beer drinker, but states he does not know the actual quantity per day. Reports he has been drinking every day since a few days after his recent detox admission. No other substances. Initial Presentation: 43 y.o. male who presented to medical detox. Inpatient admission for evaluation and treatment of alcohol withdrawal syndrome. Presented w/ need for detox from alcohol. Serum ETOH: 395. Reports drinking 12-14 shots of vodka daily, last drink on 7/19. Also noted with hypokalemia and hyponatremia. Given initial dose of phenobarbital was held due to high alcohol level and lack of withdrawal symptoms. PMH for Anxiety, HTN and asthma. Smokes 1 ppd of cigarettes. On exam, anxious and tearful. Mild wheezes bilaterally, history of asthma. Started on albuterol prn and Mucinex for symptomatic management. SEWS - done on 7/20. Plan: SEWS monitoring w/ phenobarbital management, IV thiamine/folic acid supplement, IVF, telemetry, continuous pulse ox, , trend labs, replete electrolytes as needed.  Mag 1.7, replete as need and monitor. Transaminitis; Alt 104, monitor. Preferred alcoholic beverage(s): Vodka  Quantity and frequency of alcohol intake: 12-14 shots daily  Use of any ethanol substitutes (toxic alcohols): no  Date/Time of last alcohol intake: The afternoon of 7/19  Current signs and symptoms of ethanol withdrawal: anxiety       Date: 7/20  Day 2: Pt reports anxiety. On exam, tremor. No tongue fasciculations; bilateral intention tremor. Given phenobarbital with some improvement. SEWS 6. Plan: continue SEWS monitoring w/ phenobarbital management, Iv thiamine/folic acid supplement, telemetry, continuous pulse ox, trend labs, replete electrolytes as needed. Continue Albuterol prn and Mucinex. Per [facility]; Pt had previously been on the detox unit 4/2023. Pt states he attended outpatient [facility] treatment after detox and had remained abstinent up until 1 month ago. Pt reports he is currently drinking between 14-15 shots of vodka daily  Pt reports first use at age 13 and last use 7/19/2023 of 14 shots of vodka.  Pt reports daily nicotine use of 1 PPD cigarette smoking. Cm offered referral to smoking/nicotine cessation program. Pt states no interest in stopping cigarette smoking.  Pt reports the previous 1 month as his longest abstinence and his outpatient [facility] treatment as his only [facility] treatment.  Pt denied any previous 12 step meeting attendance. Pt reports current and only withdrawal symptoms tremors and denied any hx of withdrawal seizures, hallucinations, or Dt's. Pt reports very infrequent black out alcohol use. Pt reports a family hx of alcohol abuse.   Pt stated he felt he required psychiatric need and would continue with his psychologist.  Pt's clinical presentation indicates pt has not developed any relapse prevention skills even after outpatient [facility] treatment and would benefit from inpatient treatment.  Pt's barriers present as pt's lack of community support      Wt Readings from Last 1 Encounters: 07/19/23 68.5 kg (151 lb)     Vital Signs:   07/20/23 1137 98.4 °F (36.9 °C) 94 18 145/97 113 96 % None (Room air) Lying   07/20/23 0700 98.4 °F (36.9 °C) 91 18 141/85 103 94 % None (Room air) Lying   07/20/23 0500 97.8 °F (36.6 °C) 96 18 129/79 -- 98 % None (Room air) Lying   07/19/23 2040 97.6 °F (36.4 °C) 100 18 167/112   Abnormal  -- 97 % None (Room air) --   07/19/23 1958 -- 112   Abnormal  18 121/88 -- 97 % None (Room air) Sitting     Severity of Ethanol Withdrawal Scale (SEWS):     SEWS    Row Name 07/20/23 1333 07/20/23 1157 07/20/23 1100 07/20/23 1006 07/20/23 0932   Severity of Ethanol Withdrawal Scale (SEWS)   ANXIETY: Do you feel that something bad is about to happen to you right now? 0  -LL 3  -LL 0  -LL 3  -LL 0  -LL   NAUSEA and DRY HEAVES or VOMITING? 0  -LL 0  -LL 0  -LL 3  -LL 0  -LL   SWEATING: (includes moist palms, sweating now)? Score 0 or 2 0  -LL 0  -LL 0  -LL 2  -LL 0  -LL   TREMOR: with arms extended eyes closed? 0  -LL 0  -LL 0  -LL 2  -LL 0  -LL   AGITATION: Fidgety, restless, pacing? 0  -LL 0  -LL 0  -LL 3  -LL 0  -LL   DISORIENTATION: 0  -LL 0  -LL 0  -LL 0  -LL 0  -LL   HALLUCINATIONS: 0  -LL 0  -LL 0  -LL 0  -LL 0  -LL   VITAL SIGNS: ANY (Pulse >717, Diastolic BP >67, Temp >80.7) 0  -LL 3  -LL 0  -LL 0  -LL 0  -LL   SEWS Total Score 0  -LL 6  -LL 0  -LL 13  -LL 0  -LL   Kothari Agitation Sedation Scale (RASS)   Kothari Agitation Sedation Scale (RASS) -2  -LL -1  -LL -1  -LL +1  -LL -1  -LL   Row Name 07/20/23 0500       Severity of Ethanol Withdrawal Scale (SEWS)   ANXIETY: Do you feel that something bad is about to happen to you right now? 0  -RF       NAUSEA and DRY HEAVES or VOMITING? 0  -RF       SWEATING: (includes moist palms, sweating now)? Score 0 or 2 0  -RF       TREMOR: with arms extended eyes closed? 0  -RF       AGITATION: Fidgety, restless, pacing?  0  -RF       DISORIENTATION: 0  -RF       HALLUCINATIONS: 0  -RF       VITAL SIGNS: ANY (Pulse >883, Diastolic BP >90, Temp >99.6) 0  -RF       SEWS Total Score 0  -RF           Pertinent Labs/Diagnostic Test Results:   No orders to display         Results from last 7 days   Lab Units 07/20/23  0543 07/19/23  1814   WBC Thousand/uL 3.73* 4.98   HEMOGLOBIN g/dL 13.5 14.6   HEMATOCRIT % 40.4 39.8   PLATELETS Thousands/uL 166 162   NEUTROS ABS Thousands/µL  --  1.99         Results from last 7 days   Lab Units 07/20/23  0543 07/19/23  1814   SODIUM mmol/L 143 138   POTASSIUM mmol/L 3.8 3.0*   CHLORIDE mmol/L 107 101   CO2 mmol/L 26 24   ANION GAP mmol/L 10 13   BUN mg/dL 7 6   CREATININE mg/dL 0.94 0.88   EGFR ml/min/1.73sq m 99 105   CALCIUM mg/dL 8.4 9.6   MAGNESIUM mg/dL 2.0 1.7*     Results from last 7 days   Lab Units 07/20/23  0543 07/19/23  1814   AST U/L 83* 104*   ALT U/L 40 45   ALK PHOS U/L 52 60   TOTAL PROTEIN g/dL 6.5 7.0   ALBUMIN g/dL 4.0 4.4   TOTAL BILIRUBIN mg/dL 0.52 0.53         Results from last 7 days   Lab Units 07/20/23  0543 07/19/23  1814   GLUCOSE RANDOM mg/dL 74 137       Results from last 7 days   Lab Units 07/19/23  1828   AMPH/METH  Negative   BARBITURATE UR  Negative   BENZODIAZEPINE UR  Negative   COCAINE UR  Negative   METHADONE URINE  Negative   OPIATE UR  Negative   PCP UR  Negative   THC UR  Negative     Results from last 7 days   Lab Units 07/19/23  1814   ETHANOL LVL mg/dL 395*     ED Treatment:   Medication Administration from 07/19/2023 1729 to 07/19/2023 2022       Date/Time Order Dose Route Action     07/19/2023 1956 EDT potassium chloride (K-DUR,KLOR-CON) CR tablet 40 mEq 40 mEq Oral Given        Past Medical History:   Diagnosis Date   • GERD (gastroesophageal reflux disease)    • Hypertension    • Rectal bleed      Present on Admission:  • Alcohol use disorder, severe, dependence (HCC)  • Alcohol withdrawal syndrome with complication ([address])  • Hypokalemia  • Hypomagnesemia  • Mild intermittent asthma without complication  • Primary hypertension  • Transaminitis  • Tobacco use disorder      Admitting Diagnosis: Alcohol abuse [F10.10]  Alcohol use disorder, severe, dependence ([address]) [F10.20]  Withdrawn from alcohol detoxification program [Z78.9]  Age/Sex: 43 y.o. male     Admission Orders:  Regular Diet. SCDs. Fall & Seizure Precautions. SEWS monitoring. Telemetry & Continuous Pulse Ox. Scheduled Medications:  amLODIPine, 10 mg, Oral, Daily  enoxaparin, 40 mg, Subcutaneous, Daily  folic acid 1 mg, thiamine (VITAMIN B1) 100 mg in sodium chloride 0.9 % 100 mL IV piggyback, , Intravenous, Daily  guaiFENesin, 600 mg, Oral, Q12H [place]  losartan, 25 mg, Oral, Daily  nicotine, 1 patch, Transdermal, Q24H    potassium chloride 20 mEq IVPB (premix)  Dose: 20 mEq  Freq: Once Route: IV  Last Dose: Stopped (07/20/23 0806)  Start: 07/19/23 2045 End: 07/20/23 0806      potassium chloride (K-DUR,KLOR-CON) CR tablet 40 mEq  Dose: 40 mEq  Freq: Once Route: PO  Start: 07/19/23 1915 End: 07/19/23 1956    Continuous IV Infusions:    sodium chloride 0.9 % infusion  Rate: 100 mL/hr Dose: 100 mL/hr  Freq: Continuous Route: IV  Indications of Use: IV Hydration  Last Dose: 100 mL/hr (07/20/23 0749)  Start: 07/19/23 2045 End: 07/20/23 1255      PRN Meds:  acetaminophen, 650 mg, Oral, Q6H PRN  albuterol, 2 puff, Inhalation, Q6H PRN 7/20 x1  hydrOXYzine HCL, 25 mg, Oral, Q6H PRN  nicotine polacrilex, 2 mg, Oral, Q2H PRN  traZODone, 50 mg, Oral, HS PRN 7/19 x1      phenobarbital in sodium chloride 0.9% 650 mg  Dose: 650 mg  Freq: Once Route: IV x1 given  Start: 07/20/23 1015 End: 07/20/23 1044    PHENobarbital tablet 64.8 mg  Dose: 64.8 mg  Freq: Once Route: PO x1 given  Indications of Use: ALCOHOL WITHDRAWAL SYNDROME  Start: 07/20/23 1200 End: 07/20/23 1205      IP CONSULT TO CASE MANAGEMENT  IP CONSULT TO PSYCHIATRY    Network Utilization Review Department  ATTENTION: Please call with any questions or concerns to [phone] and carefully listen to the prompts so that you are directed to the right person.  All voicemails are confidential.  Laquita Miner all requests for admission clinical reviews, approved or denied determinations and any other requests to dedicated fax number below belonging to the campus where the patient is receiving treatment.  List of dedicated fax numbers for the Facilities:  Allilauren MANDEEP (Administrative/Medical Necessity) [phone] [address] [facility] (Maternity/NICU/Pediatrics) [phone]   [address] [place] [address] [phone]   [facility] [address] [facility] [phone]   [facility] [address] [place] [address] [id number] [place] [address] [phone]   [address] [facility] [address] [phone]

## 2023-07-20 NOTE — H&P
HISTORY & PHYSICAL EXAM  DEPARTMENT OF MEDICAL TOXICOLOGY  LEVEL 4 MEDICAL DETOX UNIT  Nidia Garcia 43 y.o. male MRN: [id number]  Unit/Bed#: 5T [facility] 512-01 Encounter: [id number]      Reason for Admission/Principal Problem: Ethanol withdrawal, Ethanol use disorder  Admitting Provider: Prasad Ortiz PA-C  Attending Provider: Neal Adan*   7/19/2023  5:59 PM        * Alcohol withdrawal syndrome with complication [facility])  Assessment & Plan  · Patient reports daily alcohol use   · Last drink the afternoon of 7/19  · EtOH 395 on initial labs   · Patient denies history of withdrawal seizures   · Patient currently endorses no acute withdrawal symptoms     · Patient initiated on [facility] protocol for symptom triggered phenobarbital therapy   · Initial dose of phenobarbital held due to lack of withdrawal symptoms and high alcohol level   · Symptomatic and supportive management   · Thiamine and folic acid supplementation  · Electrolyte repletion as needed  · Pulse oximetry and telemetry monitoring        Alcohol use disorder, severe, dependence ([address])  Assessment & Plan  · Patient reports relapse in drinking approximately 1 month ago, currently drinking 12-14 shots of vodka daily  · Last drink the afternoon of 7/19  · Patient interested in inpatient rehab upon discharge    · Case management for assistance in discharge planning   · See Alcohol Withdrawal      Mild intermittent asthma without complication  Assessment & Plan  · History of asthma not on any medications  · On initial exam, patient has mild wheezes bilaterally  · SPO2 stable on room air  · No increased work of breathing  · Patient also smokes 1 ppd of cigarettes     · Started on Mucinex  · Albuterol as needed for wheezes  · Continue to monitor    Hypokalemia  Assessment & Plan  Recent Labs     07/19/23  1814   K 3.0*     · Repleated   · Continue to monitor and replete as needed     Hypomagnesemia  Assessment & Plan  Recent Labs     07/19/23  1814   MG 1.7* · Repleated  · Continue to monitor and replete as needed     Primary hypertension  Assessment & Plan  · Patient with history of hypertension controlled on amlodipine 10 mg daily and losartan 25 mg daily  · Reports medication noncompliance since relapse in drinking  · Most recent BP: 167/112  · Patient not currently exhibiting any withdrawal symptoms    · Restart home medications  · Continue to monitor    Transaminitis  Assessment & Plan  Recent Labs     07/19/23  1814   *   ALT 45   ALKPHOS 60       · Mildly elevated transaminases noted on initial labs  · In the setting of chronic alcohol use  · Continue to monitor  · Encourage cessation    Tobacco use disorder  Assessment & Plan  · Patient endorses daily tobacco use  · Cessation encouraged  · NRT ordered               VTE Prophylaxis: Enoxaparin (Lovenox)  / sequential compression device   Code Status: Full code      Anticipated Length of Stay:  Patient will be admitted on an Inpatient basis with an anticipated length of stay of  2-3  midnights. Justification for Hospital Stay: Alcohol withdrawal     For any questions or concerns, please Tiger Text the advanced practitioner in the role of [facility] [place]-AP On Call      This patient qualifies for Level IV medically managed intensive inpatient services under the criteria set by the American Society of Addiction Medicine, including dimensions 1-3. The patient is in withdrawal (or is intoxicated with high risk of withdrawal), with severe and unstable medical and/or psychiatric (dual diagnosis) problems, requiring requires 24-hour medical and nursing care and the full resources of a licensed hospital.          [facility] patient to medical detox unit and continue supportive care and stabilization of acute ethanol withdrawal per medical toxicology/detox treatment pathway.  Monitor ethanol withdrawal severity via the Severity of Ethanol Withdrawal Scale (SEWS) Q4 hours and then hourly if/when SEWS > 6. Treat withdrawal per pathway and reassess Q30-60 minutes. Mild SEWS Score 1-6  Administer medications* (IV or PO; PO preferred):  • If initial SEWS score: diazepam 10mg PO/IV x 1 AND phenobarbital 65 mg PO/IV x 1  • If repeat SEWS score 1-6: phenobarbital 65 mg PO/IV q1 hour x 5 doses maximum   Reassessment:   • SEWS q1 hour after each dose until SEWS 0 x 2 hours  • VS q1 hours (until SEWS 0, then q4 hours)  • Notify provider for bedside evaluation if 5-dose maximum is reached, RASS of -3 to -5, or SEWS score escalates to moderate or severe. Moderate SEWS Score 7-12  Administer medications* (IV):  • If initial SEWS score: diazepam 10mg IV x 1 AND phenobarbital 260 mg IV x 1  • If repeat SEWS score 7-12 or score escalated from mild: phenobarbital 130 mg IV q30 minutes x 5 doses maximum   Reassessment:  • SEWS q30 minutes after each dose until SEWS < 7 (then hourly until SEWS 0 x 2 hours)  • VS q30 minutes until SEWS < 7 (then hourly until SEWS 0, then q4 hours)  • Notify provider for bedside evaluation if 5-dose maximum is reached, RASS of -3 to -5, or SEWS score escalates to severe. Severe SEWS Score ? 13  Administer medications* (IV):  • If initial SEWS score: Diazepam 10 mg IV x 1 AND phenobarbital 650 mg IV piggyback x 1 over 15-30 minutes  • If repeat SEWS score ? 13 or score escalated from mild or moderate: phenobarbital 130 mg IV q30 minutes x 5 doses maximum   Reassessment:  • SEWS q30 minutes after each dose until SEWS < 7 (then hourly until SEWS 0 x 2 hours)   • VS q30 minutes until SEWS < 7 (then hourly until SEWS 0, then q4 hours)  • Notify provider for bedside evaluation if 5-dose maximum is reached or RASS of -3 to -5   *Hold medications and notify provider if CNS depression, respirations < 10/min, or RASS of -3 to -5.          Medications to be administered adjunctively if more than 2 grams of phenobarbital is needed for stabilization of withdrawal; require attending approval.   • Dexmedetomidine infusion 0.1-1mcg/kg/hr IV infusion, titratable to reduced agitation (Goal: RASS -2)  • Ketamine   o Acute agitated delirium: 1-2 mg/kg IV or 4-5 mg/kg IM  o Refractory withdrawal: 0.1-1mg/kg/hr IV infusion, titratable to reduced agitation (Goal: RASS -2)    Further evaluation, screening and treatment:  Evaluate complete metabolic panel, transaminases, INR, and lipase. Assess hepatic ultrasound for any sign of alcoholic liver disease or cirrhosis, and ultimately refer for further hepatic evaluation and care as/if indicated. Additional medications for ethanol associated malnutrition: Thiamine 100 mg IV daily, increase to 500 mg TID for signs/symptoms of Wernicke's Encephalopathy or Wernicke Korsakoff Syndrome   Folic acid 1 mg IV daily   Multivitamin PO daily      Will offer first monthly injection of Naltrexone 380 mg IM, once patient is stabilized, as it has been shown to assist in decreasing cravings for ethanol. Evaluate and treat for coexisting substance use, such as opioids and nicotine. Discuss risk factors for infectious disease, such as history of intravenous drug abuse, and offer hepatitis and HIV screening if indicated. Case management consultation to assist with coordination of subsequent treatment after discharge. Hx and PE limited by: None, patient alert and cooperative     HPI: Annamaria Wolfe is a 43y.o. year old male with PMHx of anxiety, HTN, and asthma, who presented to the [facility] ED requesting detoxification from alcohol. Patient reports relapse in drinking 1 month ago, currently endorsing drinking 12-14 shots of vodka daily with last drink the afternoon of 7/19. Patient was noted to have hypokalemia and hypomagnesemia on initial labs but was otherwise without acute complaint. Patient was admitted to [address] [place] [address] detox unit for alcohol detoxification.  He was started on SEWS protocol with symptom triggered phenobarbital along with symptomatic management of withdrawal. Patient received an initial dose of phenobarbital was held due to high alcohol level and lack of withdrawal symptoms. Patient was noted to have mild wheezes bilaterally and has a history of asthma. He was started on albuterol as needed and Mucinex for symptomatic management. He is also noted to be hypertensive and was restarted on his home antihypertensive medications. Preferred alcoholic beverage(s): Vodka  Quantity and frequency of alcohol intake: 12-14 shots daily  Use of any ethanol substitutes (toxic alcohols): no  Date/Time of last alcohol intake: The afternoon of 7/19  Current signs and symptoms of ethanol withdrawal: anxiety    No data recorded      Ethanol Withdrawal History  Previous ethanol withdrawal? yes  Prior inpatient treatment for ethanol withdrawal? yes  Prior outpatient treatment for ethanol withdrawal? yes  History of seizures with prior ethanol withdrawal? no  Prior treatment with naltrexone (Vivitrol)? yes  Current treatment with naltrexone (Vivitrol)? no  Other current treatment for ethanol use disorder? no  Co-existing substance use? no    Review of PDMP: yes     Social History     Substance and Sexual Activity   Alcohol Use Yes    Comment: 10-12 shots of vodka and 4-6 beers     Social History     Substance and Sexual Activity   Drug Use Never     Social History     Tobacco Use   Smoking Status Some Days   • Packs/day: 1.00   • Types: Cigarettes   Smokeless Tobacco Never       Review of Systems   Constitutional: Negative for chills, diaphoresis and fever. HENT: Negative for congestion and rhinorrhea. Respiratory: Negative for cough, chest tightness and shortness of breath. Cardiovascular: Negative for chest pain and palpitations. Gastrointestinal: Negative for abdominal pain, constipation, diarrhea, nausea and vomiting. Genitourinary: Negative for difficulty urinating.    Musculoskeletal: Negative for arthralgias, gait problem and myalgias. Neurological: Negative for dizziness, tremors, seizures and headaches. Psychiatric/Behavioral: Negative for agitation, hallucinations, self-injury and suicidal ideas. The patient is nervous/anxious. Historical Information   Past Medical History:   Diagnosis Date   • GERD (gastroesophageal reflux disease)    • Hypertension    • Rectal bleed      Past Surgical History:   Procedure Laterality Date   • EYE SURGERY     • SHOULDER SURGERY Right 12/08/2022     Family History   Problem Relation Age of Onset   • Hypertension Father    • Colon cancer Paternal Aunt      Social History   Marital Status:    Patient Pre-hospital Living Situation: Stable  Patient Pre-hospital Level of Mobility: Independent   Patient Pre-hospital Diet Restrictions: None    No Known Allergies    Prior to Admission medications    Medication Sig Start Date End Date Taking?  Authorizing Provider   albuterol (Ventolin HFA) 90 mcg/act inhaler Inhale 2 puffs every 6 (six) hours as needed for wheezing 4/30/23   Juli Cooper PA-C   amLODIPine (NORVASC) 10 mg tablet Take 1 tablet (10 mg total) by mouth daily 12/6/22   Rosana Del Cid PA-C   amoxicillin (AMOXIL) 875 mg tablet Take 1 tablet (875 mg total) by mouth 2 (two) times a day for 7 days 7/13/23 7/20/23  Randy Valera MD   folic acid (KP Folic Acid) 1 mg tablet Take 1 tablet (1 mg total) by mouth daily 4/12/23   Randy aVlera MD   hydrOXYzine HCL (ATARAX) 50 mg tablet Take 1 tablet (50 mg total) by mouth every 6 (six) hours as needed for anxiety 5/17/23   Randy Valera MD   losartan (COZAAR) 25 mg tablet Take 1 tablet (25 mg total) by mouth daily 5/17/23   Randy Valera MD   Magnesium 250 MG TABS Take 1 tablet (250 mg total) by mouth in the morning 4/12/23   Randy Valera MD   Multiple Vitamin (multivitamin) tablet Take 1 tablet by mouth daily    Historical Provider, MD   naltrexone (REVIA) 50 mg tablet Take 1 tablet (50 mg total) by mouth daily 5/17/23   Randy Valera MD   nicotine (NICODERM CQ) 21 mg/24 hr TD 24 hr patch Place 1 patch on the skin every 24 hours 2/25/21   Akshat Olea MD   predniSONE 20 mg tablet Take 2 tablets (40 mg total) by mouth daily Do not start before May 1, 2023.  5/1/23   Juli Cooper PA-C   Thiamine Mononitrate (VITAMIN B1) 100 mg tablet Take 1 tablet (100 mg total) by mouth daily 4/12/23   Akshat Olea MD   traZODone (DESYREL) 50 mg tablet Take 1 tablet (50 mg total) by mouth daily at bedtime as needed for sleep 5/17/23   Akshat Olea MD       Current Facility-Administered Medications   Medication Dose Route Frequency   • acetaminophen (TYLENOL) tablet 650 mg  650 mg Oral Q6H PRN   • albuterol (PROVENTIL HFA,VENTOLIN HFA) inhaler 2 puff  2 puff Inhalation Q6H PRN   • [START ON 7/20/2023] amLODIPine (NORVASC) tablet 10 mg  10 mg Oral Daily   • [START ON 7/20/2023] enoxaparin (LOVENOX) subcutaneous injection 40 mg  40 mg Subcutaneous Daily   • [START ON 2/00/9262] folic acid 1 mg, thiamine (VITAMIN B1) 100 mg in sodium chloride 0.9 % 100 mL IV piggyback   Intravenous Daily   • guaiFENesin (MUCINEX) 12 hr tablet 600 mg  600 mg Oral Q12H HILLARY   • hydrOXYzine HCL (ATARAX) tablet 25 mg  25 mg Oral Q6H PRN   • losartan (COZAAR) tablet 25 mg  25 mg Oral Daily   • magnesium sulfate 2 g/50 mL IVPB (premix) 2 g  2 g Intravenous Once   • nicotine (NICODERM CQ) 21 mg/24 hr TD 24 hr patch 1 patch  1 patch Transdermal Q24H   • potassium chloride 20 mEq IVPB (premix)  20 mEq Intravenous Once   • sodium chloride 0.9 % infusion  100 mL/hr Intravenous Continuous   • traZODone (DESYREL) tablet 50 mg  50 mg Oral HS PRN       Continuous Infusions:sodium chloride, 100 mL/hr             Objective     No intake or output data in the 24 hours ending 07/19/23 2121    Invasive Devices:   Peripheral IV 07/19/23 Left;Ventral (anterior) Forearm (Active)   Site Assessment WDL 07/19/23 2040   Dressing Type Transparent 07/19/23 2040   Line Status Saline locked 07/19/23 2040   Dressing Status Clean;Dry; Intact 07/19/23 2040       Vitals   Vitals:    07/19/23 1800 07/19/23 1958 07/19/23 2040   BP: (!) 175/108 121/88 (!) 167/112   TempSrc: Tympanic  Temporal   Pulse: (!) 108 (!) 112 100   Resp: 20 18 18   Patient Position - Orthostatic VS: Sitting Sitting    Temp: 97.8 °F (36.6 °C)  97.6 °F (36.4 °C)       Physical Exam  Constitutional:       General: He is not in acute distress. Appearance: He is not diaphoretic. Cardiovascular:      Rate and Rhythm: Normal rate and regular rhythm. Pulses: Normal pulses. Heart sounds: Normal heart sounds. No murmur heard. No gallop. Pulmonary:      Effort: Pulmonary effort is normal. No respiratory distress. Breath sounds: Normal breath sounds. No wheezing or rales. Abdominal:      General: Abdomen is flat. Bowel sounds are normal. There is no distension. Palpations: Abdomen is soft. Tenderness: There is no abdominal tenderness. There is no guarding. Musculoskeletal:         General: Normal range of motion. Skin:     General: Skin is warm and dry. Capillary Refill: Capillary refill takes less than 2 seconds. Neurological:      Mental Status: He is alert and oriented to person, place, and time. Motor: No tremor. Coordination: Finger-Nose-Finger Test normal.   Psychiatric:         Mood and Affect: Mood is anxious. Affect is tearful. Speech: Speech normal.         Behavior: Behavior is cooperative. Thought Content: Thought content normal.       Data:    EKG, Pathology, and Other Studies: I have personally reviewed pertinent reports.         Lab Results:  CBC ETOH     Lab Results   Component Value Date    WBC 4.98 07/19/2023    RBC 4.19 07/19/2023    HGB 14.6 07/19/2023    HCT 39.8 07/19/2023    MCV 95 07/19/2023    MCH 34.8 (H) 07/19/2023    MCHC 36.7 07/19/2023    RDW 12.8 07/19/2023     07/19/2023    MPV 8.5 (L) 07/19/2023      No results found for: "LACTICACID"   CMP UA         Component Value Date/Time    K 3.0 (L) 07/19/2023 1814     07/19/2023 1814    CO2 24 07/19/2023 1814    BUN 6 07/19/2023 1814    CREATININE 0.88 07/19/2023 1814         Component Value Date/Time    CALCIUM 9.6 07/19/2023 1814    ALKPHOS 60 07/19/2023 1814     (H) 07/19/2023 1814    ALT 45 07/19/2023 1814      Lab Results   Component Value Date    CLARITYU Clear 11/07/2020    COLORU Yellow 11/07/2020    SPECGRAV 1.022 11/07/2020    PHUR 5.5 11/07/2020    GLUCOSEU Negative 11/07/2020    KETONESU Negative 11/07/2020    BLOODU Negative 11/07/2020    PROTEIN UA Negative 11/07/2020    NITRITE Negative 11/07/2020    BILIRUBINUR Negative 11/07/2020    UROBILINOGEN 0.2 11/07/2020    LEUKOCYTESUR Negative 11/07/2020    WBCUA None Seen 11/07/2020    RBCUA None Seen 11/07/2020    HYALINE None Seen 11/07/2020    BACTERIA None Seen 11/07/2020    EPIS None Seen 11/07/2020        Liver Function Test: ASA     Lab Results   Component Value Date    TBILI 0.53 07/19/2023    ALKPHOS 60 07/19/2023     (H) 07/19/2023    ALT 45 07/19/2023    TP 7.0 07/19/2023    ALB 4.4 07/19/2023      Lab Results   Component Value Date    SALICYLATE <5 14/44/5620      Troponin APAP     No results found for: "TROPONINI"   Lab Results   Component Value Date    ACTMNPHEN <10 (L) 04/27/2023      VBG HCG     No results found for: "PHVEN", "URO8YDE", "PO2VEN", "FMD9YSC", "Jerilee Lipa", "W4IUGZSRP", "I0DVLOI"   No results found for: "HCGQUANT"   ABG Urine Drug Screen     No results found for: "PHART", "YFE2POC", "PO2ART", "VLC6BGH", "BEART", "U4XMNYGJX", "O2HGB", "SOURC", "DELMIS", "VTAC", "Ancil Reginald", "Rigo Daniela", "PEEP"   Lab Results   Component Value Date    AMPMETHUR Negative 07/19/2023    BARBTUR Negative 07/19/2023    BDZUR Negative 07/19/2023    COCAINEUR Negative 07/19/2023    METHADONEUR Negative 07/19/2023    OPIATEUR Negative 07/19/2023    PCPUR Negative 07/19/2023    THCUR Negative 07/19/2023    OXYCODONEUR Negative 07/19/2023      Lactate INR No results found for: "LACTICACID"   No results found for: "INR"   PTT Protime     No results found for: "PTT"     No results found for: "PROTIME"           Imaging Studies: I have personally reviewed pertinent reports. Counseling / Coordination of Care  Total floor / unit time spent today 60 minutes. Greater than 50% of total time was spent with the patient and / or family counseling and / or coordination of care. ** Please Note: This note has been constructed using a voice recognition system.  **

## 2023-07-20 NOTE — ASSESSMENT & PLAN NOTE
Recent Labs     07/19/23  1814 07/20/23  0543 07/21/23  0531   MG 1.7* 2.0 1.8*     · Will continue to optimize as needed

## 2023-07-20 NOTE — ASSESSMENT & PLAN NOTE
· Continue vitamin supplementation  · Case management consulted for disposition planning - OP Ethos  · Received Vivitrol upon discharge

## 2023-07-20 NOTE — DISCHARGE INSTR - OTHER ORDERS
Drug and Alcohol Resources in [facility]    If you have health insurance, including medical assistance, there should be a phone number on your insurance card that you can call to find out how to access services. The card may say, “For [address] or “For Drug and Alcohol Services” or “For Substance Abuse Services” call the number provided. Or contact [phone]    [facility] ([facility])  [facility] provides care management for adults with Opioid Use Disorder. The [facility] has a team of care managers who will help individuals get into treatment, coordinate behavioral and physical health care, and provide recovery support and guidance. Care managers will also provide information about Medication-Assisted Treatment. Contact [phone]    [facility] Drug and Alcohol Administration [phone] For additional information, contact the Department of Human Services Information and Referral Unit at [phone] between the hours of 8:30 a.m. and 4:30 p.m., Monday through Friday. Recovery Centers  These centers offer a safe, sober environment to those in recovery. A variety of programming including 12-Step Meetings, Sean Craft, Life Skills Workshops, etc. is offered at each location. Recovery Centers  These centers offer a safe, sober environment to those in recovery. A variety of programming including 12-Step Meetings, Sean Craft, Life Skills Workshops, etc. is offered at each location. [address]  [phone]  www. [facility] Hospital[facility]. org Change on Main  [facility] [place]  [facility]  [phone]  atskxe-nd-qssu. [facility] 111  [facility] 65 [facility]  [phone]   [address] [place] [address]  [phone]  www. lukasz. [facility]  [address], 1200 [facility]  [phone]  [facility] KAROLYN HALL [place] FOR REHABILITATION is [address] [place], [place]. Open Tues, Khang Alesha, Thursday from 1pm-5pm and Friday, Saturday from 11am-3pm    [employer]  Confidential free help, from public health agencies, to find substance use treatment and information. [phone]    Link for Zoom Codes for Virtual 12 step Meetings [employer].tn. aspx    AA [facility]  If you feel you have a problem with alcohol, or if you simply want to know more about AA, call our 24-hour hotline at: [facility] [facility]: [phone]    [address] Meetings can be found at [email or web address].wild/  NA Meeting list [email or web address]   [facility] mental health resources      Crisis Intervention  24 hour Emergency [address]. Call [phone]. Outside of [facility]: call 1--902-252-TQEI ([phone]) [address] mental health: Information & Referral at [phone]. [facility]  [address] [phone] drop in [place]  [address] [place] 2  [facility]0 [facility], [address] [place] [address]  [phone]

## 2023-07-20 NOTE — ASSESSMENT & PLAN NOTE
· 12-14 shots of vodka daily  · Last drink was 7/19 in the afternoon  · EtOH 395 on admission  · Last admission was 4/23. · Has received 910 of phenobarbital total   · SEWS protocol discontinued on 7/21/23  · Patient no longer displays signs or symptoms of alcohol withdrawal  · Received a total of 1039.2 mg of phenobarbital. Last dose administered at 2 pm on 7/21/23.  Has been monitored off of protocol, stable from withdrawal perspective

## 2023-07-21 PROBLEM — F39 UNSPECIFIED MOOD (AFFECTIVE) DISORDER (HCC): Status: ACTIVE | Noted: 2023-07-21

## 2023-07-21 LAB
ANION GAP SERPL CALCULATED.3IONS-SCNC: 10 MMOL/L
BUN SERPL-MCNC: 6 MG/DL (ref 5–25)
CALCIUM SERPL-MCNC: 8.1 MG/DL (ref 8.4–10.2)
CHLORIDE SERPL-SCNC: 104 MMOL/L (ref 96–108)
CO2 SERPL-SCNC: 23 MMOL/L (ref 21–32)
CREAT SERPL-MCNC: 0.77 MG/DL (ref 0.6–1.3)
ERYTHROCYTE [DISTWIDTH] IN BLOOD BY AUTOMATED COUNT: 12.8 % (ref 11.6–15.1)
GFR SERPL CREATININE-BSD FRML MDRD: 111 ML/MIN/1.73SQ M
GLUCOSE SERPL-MCNC: 108 MG/DL (ref 65–140)
HCT VFR BLD AUTO: 40.6 % (ref 36.5–49.3)
HGB BLD-MCNC: 13.8 G/DL (ref 12–17)
MAGNESIUM SERPL-MCNC: 1.8 MG/DL (ref 1.9–2.7)
MCH RBC QN AUTO: 32.7 PG (ref 26.8–34.3)
MCHC RBC AUTO-ENTMCNC: 34 G/DL (ref 31.4–37.4)
MCV RBC AUTO: 96 FL (ref 82–98)
PLATELET # BLD AUTO: 162 THOUSANDS/UL (ref 149–390)
PMV BLD AUTO: 9.1 FL (ref 8.9–12.7)
POTASSIUM SERPL-SCNC: 3.1 MMOL/L (ref 3.5–5.3)
RBC # BLD AUTO: 4.22 MILLION/UL (ref 3.88–5.62)
SODIUM SERPL-SCNC: 137 MMOL/L (ref 135–147)
WBC # BLD AUTO: 3.77 THOUSAND/UL (ref 4.31–10.16)

## 2023-07-21 PROCEDURE — 99233 SBSQ HOSP IP/OBS HIGH 50: CPT | Performed by: EMERGENCY MEDICINE

## 2023-07-21 PROCEDURE — 99254 IP/OBS CNSLTJ NEW/EST MOD 60: CPT | Performed by: PSYCHIATRY & NEUROLOGY

## 2023-07-21 PROCEDURE — 80048 BASIC METABOLIC PNL TOTAL CA: CPT | Performed by: EMERGENCY MEDICINE

## 2023-07-21 PROCEDURE — 83735 ASSAY OF MAGNESIUM: CPT | Performed by: EMERGENCY MEDICINE

## 2023-07-21 PROCEDURE — 85027 COMPLETE CBC AUTOMATED: CPT | Performed by: EMERGENCY MEDICINE

## 2023-07-21 RX ORDER — NALTREXONE HYDROCHLORIDE 50 MG/1
50 TABLET, FILM COATED ORAL DAILY
Status: DISCONTINUED | OUTPATIENT
Start: 2023-07-21 | End: 2023-07-22

## 2023-07-21 RX ORDER — LANOLIN ALCOHOL/MO/W.PET/CERES
6 CREAM (GRAM) TOPICAL
Status: DISCONTINUED | OUTPATIENT
Start: 2023-07-21 | End: 2023-07-22 | Stop reason: HOSPADM

## 2023-07-21 RX ORDER — PHENOBARBITAL 64.8 MG/1
64.8 TABLET ORAL ONCE
Status: COMPLETED | OUTPATIENT
Start: 2023-07-21 | End: 2023-07-21

## 2023-07-21 RX ORDER — MAGNESIUM SULFATE HEPTAHYDRATE 40 MG/ML
2 INJECTION, SOLUTION INTRAVENOUS ONCE
Status: COMPLETED | OUTPATIENT
Start: 2023-07-21 | End: 2023-07-21

## 2023-07-21 RX ORDER — POTASSIUM CHLORIDE 20 MEQ/1
40 TABLET, EXTENDED RELEASE ORAL ONCE
Status: COMPLETED | OUTPATIENT
Start: 2023-07-21 | End: 2023-07-21

## 2023-07-21 RX ORDER — FLUOXETINE HYDROCHLORIDE 20 MG/1
20 CAPSULE ORAL DAILY
Status: DISCONTINUED | OUTPATIENT
Start: 2023-07-21 | End: 2023-07-22 | Stop reason: HOSPADM

## 2023-07-21 RX ADMIN — AMLODIPINE BESYLATE 10 MG: 10 TABLET ORAL at 08:29

## 2023-07-21 RX ADMIN — ALBUTEROL SULFATE 2 PUFF: 90 AEROSOL, METERED RESPIRATORY (INHALATION) at 07:30

## 2023-07-21 RX ADMIN — NICOTINE 1 PATCH: 21 PATCH, EXTENDED RELEASE TRANSDERMAL at 08:30

## 2023-07-21 RX ADMIN — HYDROXYZINE HYDROCHLORIDE 25 MG: 25 TABLET ORAL at 22:20

## 2023-07-21 RX ADMIN — POTASSIUM CHLORIDE 40 MEQ: 1500 TABLET, EXTENDED RELEASE ORAL at 08:29

## 2023-07-21 RX ADMIN — NICOTINE POLACRILEX 4 MG: 4 GUM, CHEWING BUCCAL at 20:28

## 2023-07-21 RX ADMIN — MAGNESIUM SULFATE 2 G: 2 INJECTION INTRAVENOUS at 08:58

## 2023-07-21 RX ADMIN — PHENOBARBITAL 64.8 MG: 64.8 TABLET ORAL at 10:03

## 2023-07-21 RX ADMIN — Medication 6 MG: at 22:20

## 2023-07-21 RX ADMIN — NALTREXONE HYDROCHLORIDE 50 MG: 50 TABLET, FILM COATED ORAL at 14:40

## 2023-07-21 RX ADMIN — GUAIFENESIN 600 MG: 600 TABLET, EXTENDED RELEASE ORAL at 20:28

## 2023-07-21 RX ADMIN — FLUOXETINE 20 MG: 20 CAPSULE ORAL at 09:53

## 2023-07-21 RX ADMIN — NICOTINE POLACRILEX 2 MG: 2 GUM, CHEWING BUCCAL at 08:29

## 2023-07-21 RX ADMIN — PHENOBARBITAL 64.8 MG: 64.8 TABLET ORAL at 14:45

## 2023-07-21 RX ADMIN — GUAIFENESIN 600 MG: 600 TABLET, EXTENDED RELEASE ORAL at 08:29

## 2023-07-21 RX ADMIN — TRAZODONE HYDROCHLORIDE 50 MG: 50 TABLET ORAL at 20:28

## 2023-07-21 RX ADMIN — NICOTINE POLACRILEX 2 MG: 2 GUM, CHEWING BUCCAL at 15:49

## 2023-07-21 RX ADMIN — NICOTINE POLACRILEX 4 MG: 4 GUM, CHEWING BUCCAL at 17:22

## 2023-07-21 RX ADMIN — ALBUTEROL SULFATE 2 PUFF: 90 AEROSOL, METERED RESPIRATORY (INHALATION) at 14:45

## 2023-07-21 RX ADMIN — LOSARTAN POTASSIUM 25 MG: 25 TABLET, FILM COATED ORAL at 08:29

## 2023-07-21 RX ADMIN — FOLIC ACID: 5 INJECTION, SOLUTION INTRAMUSCULAR; INTRAVENOUS; SUBCUTANEOUS at 08:31

## 2023-07-21 NOTE — CONSULTS
Initial Psychiatry Consultation  Department of Psychiatry and [facility] [place] De McCoy 43 y.o. male MRN: [id number]  Unit/Bed#: David Santiago 940-83 Encounter: [id number]     Assessment & Plan     Assessment     Junior Twiari is a 43 y.o. male, , lives in home with wife, daughters and sons, with history of alcohol use disorder, who initially presented to [facility] ED for detox evaluation. Symptoms endorsed on initial psychiatric evaluation on 7/21/23 include "okay" but improving sleep (7-8 hours, intermittent awakenings for 10-15 min, 2-3 awakenings nightly), guilt (intermittent), weight loss (17 lb decrease in 1-2 months, 173 lbs to 152 lbs), decreased energy (improving), decreased concentration (improving), decreased attention (improving), decreased memory (improving), decreased appetite (improving), racing thoughts, irritability in setting of alcohol use, and history of multiple losses during youth (mother left him at 3 yo, dad passed away at 10 yo, grandmother passed away at 11 yo, uncle passed away at 20 yo; associated intermittent flashbacks). Patient does not meet inpatient criteria for psychiatric hospitalization at this time. At this time, will initiate Prozac 20 mg daily for mood. Principal Psychiatric Problem:  1. Unspecified mood disorder ([address])  a. Differential: MDD v dysthymia v PTSD v ASD v adjustment disorder v complex bereavement    Principal Problem:    Alcohol withdrawal syndrome with complication (HCC)  Active Problems:    Alcohol use disorder, severe, dependence ([address])    Transaminitis    Hypomagnesemia    Primary hypertension    Mild intermittent asthma without complication    Tobacco use disorder    Hypokalemia      Plan     Recommendations     Discussed plan with primary team as follows:  • Admission labs reviewed. • Patient does not meet criteria for inpatient psychiatric hospitalization. • Recommend outpatient psychiatric services.   • Start Prozac 20 mg daily for mood.  • Psychiatry will continue to follow as needed. Please contact our service via [employer] with any additional questions or concerns. If contacting after hours, please call or TigerText the on-call team (MALENAHÉCTOR: [phone]) with any questions or concerns. • Collaborate with collaterals for baseline assessment and disposition as indicated  • Please reach out to on-call Psychiatry team via [address] [place] [address] [address] [place], or if after hours/Fri/Sat/Sunday contact on-call psychiatric service via [address] [place] [address] 22 ([phone]) with any questions or concerns. Risks, benefits and possible side effects of Medications:   Risks, benefits, and possible side effects of medications explained to patient and patient verbalizes understanding. History of Present Illness   History of Present Illness     Provider Requesting Consult: Sai Damon MD  Reason for Consult / Principal Problem: "trauma", anxiety    Chief Complaint: "overdrinking"    Sally Edmonds is a 43 y.o. male, with history of anxiety, HTN, asthma, GERD, alcohol use disorder, who initially presented to [facility] ED and later admitted to [facility] for alcohol detoxification. As per ED provider Jareth Tejada PA-C, on 7/19/23: " 41-year-old male with past medical history of alcohol use disorder, hypertension, GERD presents emergency department for detox from alcohol. Been drinking 12-15 shooters of vodka daily for the past 3 months. Last drink was around 3PM, thinks he drank 10 shooters total today. Was on naltrexone, has not been taking for 3 months.  "     As per medical toxicology AP Garry Moyer PA-C, on 7/19/23: " Sally Edmonds is a 43y.o. year old male with PMHx of anxiety, HTN, and asthma, who presented to the [address] [place] [address] ED requesting detoxification from alcohol. Patient reports relapse in drinking 1 month ago, currently endorsing drinking 12-14 shots of vodka daily with last drink the afternoon of 7/19.   Patient was noted to have hypokalemia and hypomagnesemia on initial labs but was otherwise without acute complaint. Patient was admitted to [address] [place] [address] detox unit for alcohol detoxification. [facility] with symptom triggered phenobarbital along with symptomatic management of withdrawal. Patient received an initial dose of phenobarbital was held due to high alcohol level and lack of withdrawal symptoms. Patient was noted to have mild wheezes bilaterally and has a history of asthma. He was started on albuterol as needed and Mucinex for symptomatic management. He is also noted to be hypertensive and was restarted on his home antihypertensive medications. "    On initial psychiatric evaluation, Iain Martinez was looks younger than stated age, marginal hygiene, bearded,pleasant, cooperative, calm, and good eye contact. Patient endorsed "good" mood. He reported presented due to "overdrinking". Symptoms endorsed include "okay" but improving sleep (7-8 hours, intermittent awakenings for 10-15 min, 2-3 awakenings nightly), guilt (intermittent), weight loss (17 lb decrease in 1-2 months, 173 lbs to 152 lbs), decreased energy (improving), decreased concentration (improving), decreased attention (improving), decreased memory (improving), decreased appetite (improving), racing thoughts, irritability in setting of alcohol use, and history of multiple losses during youth (mother left him at 3 yo, dad passed away at 10 yo, grandmother passed away at 13 yo, uncle passed away at 20 yo; associated intermittent flashbacks). Patient denied nightmares, anhedonia, history of eating disorders, hopelessness, helplessness, worthlessness, panic attacks, impulsivity, distractibility, flight of ideas, grandiose ideation, paranoia, history of audiovisual hallucinations, or history of seizures. Patient denied current auditory hallucinations. He denied current visual hallucinations. Patient denied current passive or active suicidal ideation, intent, or plan.  He denied current passive or active homicidal ideation, intent, or plan. He has access to weapons or firearms: couple handguns (secured, locked, bullets kept separately, wife and patient have access). Medical Review Of Systems:  Pertinent items are noted in HPI. Psychiatric ROS and PMHx     Psychiatric Review Of Systems:  Sleep: Denies  Loss of Interest/Anhedonia: no  Guilt/hopeless: Yes, intermittent guilt  Low energy/anergy: yes  Poor Concentration: yes  Appetite changes: Yes, decreased but improving  Weight changes: Yes, decreased  Somatic symptoms: no  Anxiety/panic: Yes  Martha: no  Self injurious behavior/risky behavior: no  Trauma: yes   If yes: flashbacks    Historical Information     Past Psychiatric History:   Past Inpatient Psychiatric management:   Denied  Past Outpatient Psychiatric management:   Therapist, Reagan Singer. Denied psychiatry services. Past Medication trials:   Denied  Past Suicide attempts:   Denied  History of non-suicidal self injury:   Denied  Past Violent behavior:   Denied    Substance Abuse History:    Social History     Tobacco History     Smoking Status  Some Days Smoking Frequency  1.00 packs/day Smoking Tobacco Type  Cigarettes    Smokeless Tobacco Use  Never          Alcohol History     Alcohol Use Status  Yes Comment  10-12 shots of vodka and 4-6 beers          Drug Use     Drug Use Status  Never          Sexual Activity     Sexually Active  Yes Partners  Female          Activities of Daily Living    Not Asked               Additional Substance Use Detail     Questions Responses    Problems Due to Past Use of Alcohol? Yes    Problems Due to Past Use of Substances?  No    Substance Use Assessment Denies substance use within the past 12 months    Alcohol Use Frequency Daily    Alcohol Drink of Choice beer/alcohol    1st Use of Alcohol 15    Last Use of Alcohol & Amount 7/19/2023  14 shots vodka    Longest Abstinence from Alcohol 2-3 days    Last reviewed by Maria Elena Calderon RN on 5/61/7001 I have assessed this patient for substance use within the past 12 months   Alcohol: 14-15 shots of vodka followed by 3-4 beers, last drink prior to admission on 7/19 afternoon  Tobacco: 1 ppd since 15 yo (28 years total)  Marijuana: Denied  Cocaine: Denied  Heroin/Opioids: Denied  Other Substances: Denied  Longest clean time: Unknown  History of rehab or detox: Prior detox, no rehab history per patient    Family Psychiatric History:   Psychiatric Illness: Unknown, possibly in mother  Substance Use: Unknown, possibly in mother  Suicide Attempts: Denied    Social History:  Education: Associate degree in business  Learning Disabilities: denied  Marital history: , 5 children  Living arrangement, social support: The patient lives in home with wife and children: how many 11, ages 18 months to 24 yo. Support systems: wife, aunt, friends  Access to firearms: Yes, couple handguns (secured, locked, bullets kept separately, wife and patient have access). Occupational History: employed, currently on workman's comp since December for rotator cuff injury  Functioning Relationships: good support system and good relationship with children.   Other Pertinent History: Denied legal or  history      Traumatic History:   Abuse: sexual: denied, physical: denied, emotional: denied and verbal: denied  Other Traumatic Events: (mother left him at 3 yo, dad passed away at 10 yo, grandmother passed away at 11 yo, uncle passed away at 20 yo; associated intermittent flashbacks)    Past Medical History:   Diagnosis Date   • GERD (gastroesophageal reflux disease)    • Hypertension    • Rectal bleed        Meds/Allergies   all current active meds have been reviewed, current meds:   Current Facility-Administered Medications   Medication Dose Route Frequency   • acetaminophen (TYLENOL) tablet 650 mg  650 mg Oral Q6H PRN   • albuterol (PROVENTIL HFA,VENTOLIN HFA) inhaler 2 puff  2 puff Inhalation Q6H PRN   • amLODIPine (NORVASC) tablet 10 mg  10 mg Oral Daily   • enoxaparin (LOVENOX) subcutaneous injection 40 mg  40 mg Subcutaneous Daily   • FLUoxetine (PROzac) capsule 20 mg  20 mg Oral Daily   • folic acid 1 mg, thiamine (VITAMIN B1) 100 mg in sodium chloride 0.9 % 100 mL IV piggyback   Intravenous Daily   • guaiFENesin (MUCINEX) 12 hr tablet 600 mg  600 mg Oral Q12H HILLARY   • hydrOXYzine HCL (ATARAX) tablet 25 mg  25 mg Oral Q6H PRN   • losartan (COZAAR) tablet 25 mg  25 mg Oral Daily   • magnesium sulfate 2 g/50 mL IVPB (premix) 2 g  2 g Intravenous Once   • nicotine (NICODERM CQ) 21 mg/24 hr TD 24 hr patch 1 patch  1 patch Transdermal Q24H   • nicotine polacrilex (NICORETTE) gum 2 mg  2 mg Oral Q2H PRN   • traZODone (DESYREL) tablet 50 mg  50 mg Oral HS PRN    and PTA meds:   Prior to Admission Medications   Prescriptions Last Dose Informant Patient Reported? Taking?    Magnesium 250 MG TABS   No No   Sig: Take 1 tablet (250 mg total) by mouth in the morning   Multiple Vitamin (multivitamin) tablet  Self Yes No   Sig: Take 1 tablet by mouth daily   Thiamine Mononitrate (VITAMIN B1) 100 mg tablet   No No   Sig: Take 1 tablet (100 mg total) by mouth daily   albuterol (Ventolin HFA) 90 mcg/act inhaler   No No   Sig: Inhale 2 puffs every 6 (six) hours as needed for wheezing   amLODIPine (NORVASC) 10 mg tablet   No No   Sig: Take 1 tablet (10 mg total) by mouth daily   amoxicillin (AMOXIL) 875 mg tablet   No No   Sig: Take 1 tablet (875 mg total) by mouth 2 (two) times a day for 7 days   folic acid (KP Folic Acid) 1 mg tablet   No No   Sig: Take 1 tablet (1 mg total) by mouth daily   hydrOXYzine HCL (ATARAX) 50 mg tablet   No No   Sig: Take 1 tablet (50 mg total) by mouth every 6 (six) hours as needed for anxiety   losartan (COZAAR) 25 mg tablet   No No   Sig: Take 1 tablet (25 mg total) by mouth daily   naltrexone (REVIA) 50 mg tablet   No No   Sig: Take 1 tablet (50 mg total) by mouth daily   nicotine (NICODERM CQ) 21 mg/24 hr TD 24 hr patch No No   Sig: Place 1 patch on the skin every 24 hours   predniSONE 20 mg tablet   No No   Sig: Take 2 tablets (40 mg total) by mouth daily Do not start before May 1, 2023. traZODone (DESYREL) 50 mg tablet   No No   Sig: Take 1 tablet (50 mg total) by mouth daily at bedtime as needed for sleep      Facility-Administered Medications: None     No Known Allergies    Objective   Vital signs in last 24 hours:  Temp:  [97.3 °F (36.3 °C)-98.4 °F (36.9 °C)] 97.3 °F (36.3 °C)  HR:  [79-94] 79  Resp:  [16-18] 16  BP: (144-162)/() 161/113    No intake or output data in the 24 hours ending 07/21/23 0907    Mental Status Evaluation and Medical ROS     Mental Status Evaluation:  Appearance: Overtly male, casually dressed, dressed in hospital attire, wearing hospital clothes, improved grooming, looks stated age, bearded  Behavior: pleasant, cooperative, calm, good eye contact  Muscle Strength and Tone, Gait, and Balance: normal muscle strength and normal muscle tone  Motor Activity: no abnormal movements  Speech: normal rate, normal volume, normal pitch, spontaneous, fluent, clear, coherent  Mood: "good"  Affect: normal range and intensity  Language: no difficulty naming common objects, no difficulty repeating a phrase  Thought process: organized, logical, goal directed, normal rate of thoughts  Thought content: no overt delusions  Perceptual disturbances: no auditory hallucinations, no visual hallucinations  Risk potential: No active or passive suicidal or homicidal ideation was verbalized during interview  Sensorium: oriented to person, place, time/date and situation  Cognition and Memory: recent and remote memory grossly intact  Insight: Good  Judgment: Ayden    Laboratory results:    I have personally reviewed all pertinent laboratory/tests results.   Most Recent Labs:   Lab Results   Component Value Date    WBC 3.77 (L) 07/21/2023    RBC 4.22 07/21/2023    HGB 13.8 07/21/2023    HCT 40.6 07/21/2023     07/21/2023 RDW 12.8 07/21/2023    NEUTROABS 1.99 07/19/2023    SODIUM 137 07/21/2023    K 3.1 (L) 07/21/2023     07/21/2023    CO2 23 07/21/2023    BUN 6 07/21/2023    CREATININE 0.77 07/21/2023    GLUC 108 07/21/2023    GLUF 98 11/07/2020    CALCIUM 8.1 (L) 07/21/2023    AST 83 (H) 07/20/2023    ALT 40 07/20/2023    ALKPHOS 52 07/20/2023    TP 6.5 07/20/2023    ALB 4.0 07/20/2023    TBILI 0.52 07/20/2023    CHOLESTEROL 228 (H) 11/07/2020    HDL 66 11/07/2020    TRIG 293 (H) 11/07/2020    LDLCALC 103 (H) 11/07/2020    [address] 162 11/07/2020    CKT2GEVQIYPM 1.720 11/07/2020       Imaging Studies:   No results found. No Chest XR results available for this patient. EKG: KSx=355 on 7/19/23  Results for orders placed or performed during the hospital encounter of 07/19/23 (from the past 1000 hour(s))   ECG 12 lead    Collection Time: 07/19/23  6:24 PM   Result Value    Ventricular Rate 102    Atrial Rate 102    SC Interval 170    QRSD Interval 102    QT Interval 336    QTC Interval 437    P Axis 61    QRS Axis -4    T Wave Axis 66    Narrative    Sinus tachycardia  Otherwise normal ECG  When compared with ECG of 27-APR-2023 12:24,  No significant change was found  Confirmed by Alicia Lord ([id number]) on 7/20/2023 6:19:49 AM     *Note: Due to a large number of results and/or encounters for the requested time period, some results have not been displayed. A complete set of results can be found in Results Review.        Risk Assessment   Risk of Harm to Self:   • The following ratings are based on assessment at the time of the interview  • Demographic risk factors include: , male  • Historical Risk Factors include: chronic depressive symptoms, chronic anxiety symptoms, history of anxiety, alcohol use, history of traumatic experiences  • Current Specific Risk Factors include: diagnosis of mood disorder, chronic depressive symptoms, chronic anxiety symptoms, alcohol use, ongoing depressive symptoms  • Protective Factors: no current suicidal ideation, stable mood, no current psychotic symptoms, improved anxiety symptoms, ability to adapt to change, ability to manage anger well, ability to make plans for the future, no current suicidal plan or intent, outpatient psychiatric follow up established, family support established, being a parent, being , stable job, stable housing, connection to own children, effective decision-making skills, effective problem solving skills, good self-esteem, having a desire to be alive, having a desire to live, impulse control, responsibilities and duties to others, safe and stable living environment, sense of determination, strong relationships, supportive family, supportive friends  • Weapons/Firearms: guns. The following steps have been taken to ensure weapons are properly secured: locked, secured  • Based on today's assessment, Reagan Keller presents the following risk of harm to self: low    Risk of Harm to Others:  • The following ratings are based on assessment at the time of the interview  • Demographic Risk Factors include: male. • Historical Risk Factors include: alcohol abuse. • Current Specific Risk Factors include: recent substance use, access to weapons  • Protective Factors: no current homicidal ideation, stable mood, no current psychotic symptoms, compliant with treatment, willing to continue psychiatric treatment, willing to remain free from substance use, outpatient follow up established, ability to adapt to change, able to manage anger well, effective coping skills, stable living environment, good support system, supportive family, supportive friends, strong relationships, responsibilities and duties to others, being a parent, being , sense of personal control, access to mental health treatment  • Weapons/Firearms: guns.  The following steps have been taken to ensure weapons are properly secured: locked, secured  • Based on today's assessment, Reagan Keller presents the following risk of harm to others: low     This note has been constructed in part using a voice recognition system. There may be translation, syntax,  or grammatical errors. If you have any questions, please contact the dictating provider.     David Fernando MD  Department of Psychiatry and [facility]

## 2023-07-21 NOTE — CASE MANAGEMENT
Cm spoke with pt and pt maintained he [facility] Cm consulted with medical staff and informed pt was considering MAT vivotril IM. Cm contacted [facility] treatment and confirmed that MAT vivotril could be provided at this location.

## 2023-07-21 NOTE — CASE MANAGEMENT
Cm received a call from Karla Gilbert at [facility] requesting to discuss pt needs. Cm returned this call at [phone] and a  was left requesting a return call to this 's phone.

## 2023-07-21 NOTE — PROGRESS NOTES
PROGRESS NOTE  DEPARTMENT OF MEDICAL TOXICOLOGY  LEVEL 4 MEDICAL DETOX UNIT  Hector Vincent 43 y.o. male MRN: [id number]  Unit/Bed#: 5T DETOX 512-01 Encounter: [id number]      Reason for Admission/Principal Problem: Alcohol withdrawal, alcohol use disorder  Rounding Provider: Radha Bolton DO  Attending Provider: Maco Mendieta MD   7/19/2023  5:59 PM           Unspecified mood (affective) disorder ([address])  Assessment & Plan  · Per psychiatry patient has "a lot of trauma" that may be contributing to his drinking habits  · Psychiatry evaluated patient and are planning to start patient on 20 mg of Prozac  · Psychiatry also recommends outpatient psychiatry follow-up at this time  · Continue to monitor for SI and HI    Hypokalemia  Assessment & Plan  Recent Labs     07/19/23  1814 07/20/23  0543 07/21/23  0531   K 3.0* 3.8 3.1*     · Will continue to optimize as needed    Tobacco use disorder  Assessment & Plan  · Encouraged cessation  · Nicotine replacement therapy      Mild intermittent asthma without complication  Assessment & Plan  · Albuterol as needed  · Mucinex started  · Encouraged tobacco cessation    Primary hypertension  Assessment & Plan  · Home blood pressure medications continued upon admission    Hypomagnesemia  Assessment & Plan  Recent Labs     07/19/23  1814 07/20/23  0543 07/21/23  0531   MG 1.7* 2.0 1.8*     · Will continue to optimize as needed    Transaminitis  Assessment & Plan  Recent Labs     07/19/23  1814 07/20/23  0543   * 83*   ALT 45 40   ALKPHOS 60 52       · Improving; likely secondary to chronic alcohol use  · Encourage alcohol cessation    Alcohol use disorder, severe, dependence ([address])  Assessment & Plan  · Continue vitamin supplementation  · Case management consulted for disposition planning - OP Ethos  · Patient may be interested in naltrexone IM prior to discharge; states he experienced sexual dysfunction after discharge last admission with new medications of naltrexone and hydroxyzine that improved after medication cessation. * Alcohol withdrawal syndrome with complication [facility])  Assessment & Plan  · 12-14 shots of vodka daily  · Last drink was 7/19 in the afternoon  · EtOH 395 on admission  · Last admission was 4/23. · Has received 910 of phenobarbital total  · Continue SEWS protocol with symptom-triggered, as needed phenobarbital            VTE Pharmacologic Prophylaxis:   Pharmacologic: Enoxaparin (Lovenox)  Mechanical VTE Prophylaxis in Place: no    Code Status: Level 1 - Full Code    Patient Centered Rounds: I have performed bedside rounds with nursing staff today. Discussions with Specialists or Other Care Team Provider: Psychiatry: start Prozac 20 mg, OP f/up     Education and Discussions with Family / Patient: patient    Time Spent for Care: 30 minutes. More than 50% of total time spent on counseling and coordination of care as described above. Current Length of Stay: 2 day(s)    Current Patient Status: Inpatient     Certification Statement: The patient will continue to require additional inpatient hospital stay due to Completion of alcohol withdrawal treatment and setting up outpatient follow-ups Discharge Plan: Discharge home with outpatient follow-up to psychiatry and outpatient alcohol use disorder treatment after completion of medical treatment for alcohol withdrawal      Subjective:   Patient states he feels significantly better. Patient notes mild tremulousness but states that at home his withdrawal symptoms usually start around 10 AM.  Patient is eating and drinking well. No complaints of pain today. No shortness of breath at this time.     Objective:     Clinical Opiate Withdrawal Scale  Pulse: 79    SEWS Total Score: 0 (7/21/2023  7:55 AM)        Last 24 Hours Medication List:   Current Facility-Administered Medications   Medication Dose Route Frequency Provider Last Rate   • acetaminophen  650 mg Oral Q6H PRN Mae Perez PA-C • albuterol  2 puff Inhalation Q6H PRN Maciel JesusitaJOSE pizano     • amLODIPine  10 mg Oral Daily Maciel JOSE Mc     • enoxaparin  40 mg Subcutaneous Daily Sidra Fuller PA-C     • FLUoxetine  20 mg Oral Daily Rupert Valdovinos MD     • folic acid 1 mg, thiamine (VITAMIN B1) 100 mg in sodium chloride 0.9 % 100 mL IV piggyback   Intravenous Daily Sidra Fuller PA-C Stopped (23 9231)   • guaiFENesin  600 mg Oral Q12H [address] Maciel JOSE Mc     • hydrOXYzine HCL  25 mg Oral Q6H PRN Maciel JOSE Mc     • losartan  25 mg Oral Daily Maciel JOSE Mc     • magnesium sulfate  2 g Intravenous Once Jarad Capo, DO 2 g (23 0700)   • nicotine  1 patch Transdermal Q24H Maciel JOSE Mc     • nicotine polacrilex  2 mg Oral Q2H PRN Meghana Astorga MD     • traZODone  50 mg Oral HS PRN Sidra Fuller PA-C           Vitals:   Temp (24hrs), Av.9 °F (36.6 °C), Min:97.3 °F (36.3 °C), Max:98.4 °F (36.9 °C)    Temp:  [97.3 °F (36.3 °C)-98.4 °F (36.9 °C)] 97.3 °F (36.3 °C)  HR:  [79-94] 79  Resp:  [16-18] 16  BP: (144-162)/() 161/113  SpO2:  [95 %-97 %] 95 %  Body mass index is 23.65 kg/m². Input and Output Summary (last 24 hours):No intake or output data in the 24 hours ending 23 0954    Physical Exam:   Physical Exam  Vitals and nursing note reviewed. Constitutional:       General: He is not in acute distress. Appearance: He is well-developed. HENT:      Head: Normocephalic and atraumatic. Eyes:      Conjunctiva/sclera: Conjunctivae normal.   Cardiovascular:      Rate and Rhythm: Normal rate and regular rhythm. Heart sounds: No murmur heard. Pulmonary:      Effort: Pulmonary effort is normal. No respiratory distress. Breath sounds: Examination of the left-lower field reveals wheezing. Wheezing (Otherwise clear) present. Abdominal:      Palpations: Abdomen is soft. Tenderness: There is no abdominal tenderness. Musculoskeletal:         General: No swelling. Cervical back: Neck supple. Skin:     General: Skin is warm and dry. Capillary Refill: Capillary refill takes less than 2 seconds. Neurological:      General: No focal deficit present. Mental Status: He is alert and oriented to person, place, and time. Motor: Tremor (mild) present. Psychiatric:         Mood and Affect: Mood normal.         Additional Data:     Labs:   Results from last 7 days   Lab Units 07/21/23  0531 07/20/23  0543 07/19/23  1814   WBC Thousand/uL 3.77*   < > 4.98   HEMOGLOBIN g/dL 13.8   < > 14.6   HEMATOCRIT % 40.6   < > 39.8   PLATELETS Thousands/uL 162   < > 162   NEUTROS PCT %  --   --  40*   LYMPHS PCT %  --   --  49*   MONOS PCT %  --   --  10   EOS PCT %  --   --  0    < > = values in this interval not displayed. Results from last 7 days   Lab Units 07/21/23  0531 07/20/23  0543   SODIUM mmol/L 137 143   POTASSIUM mmol/L 3.1* 3.8   CHLORIDE mmol/L 104 107   CO2 mmol/L 23 26   BUN mg/dL 6 7   CREATININE mg/dL 0.77 0.94   ANION GAP mmol/L 10 10   CALCIUM mg/dL 8.1* 8.4   ALBUMIN g/dL  --  4.0   TOTAL BILIRUBIN mg/dL  --  0.52   ALK PHOS U/L  --  52   ALT U/L  --  40   AST U/L  --  83*   GLUCOSE RANDOM mg/dL 108 74                              * I Have Reviewed All Lab Data Listed Above. * Additional Pertinent Lab Tests Reviewed: [address] [place] [address] Admission Reviewed      Imaging Studies: I have personally reviewed pertinent reports. Recent Cultures (last 7 days): Today, Patient Was Seen By: Jaycob George DO    ** Please Note: Dictation voice to text software may have been used in the creation of this document.  **

## 2023-07-21 NOTE — ASSESSMENT & PLAN NOTE
· Per psychiatry patient has "a lot of trauma" that may be contributing to his drinking habits  · Psychiatry evaluated patient and are planning to start patient on 20 mg of Prozac; continue upon discharge   · Psychiatry also recommends outpatient psychiatry follow-up at this time  · Continue to monitor for SI and HI

## 2023-07-22 VITALS
DIASTOLIC BLOOD PRESSURE: 108 MMHG | RESPIRATION RATE: 18 BRPM | SYSTOLIC BLOOD PRESSURE: 153 MMHG | TEMPERATURE: 97.8 F | HEIGHT: 67 IN | OXYGEN SATURATION: 97 % | WEIGHT: 151 LBS | HEART RATE: 71 BPM | BODY MASS INDEX: 23.7 KG/M2

## 2023-07-22 PROBLEM — E87.6 HYPOKALEMIA: Status: RESOLVED | Noted: 2023-04-28 | Resolved: 2023-07-22

## 2023-07-22 PROBLEM — E83.42 HYPOMAGNESEMIA: Status: RESOLVED | Noted: 2023-04-27 | Resolved: 2023-07-22

## 2023-07-22 PROBLEM — F10.939 ALCOHOL WITHDRAWAL SYNDROME WITH COMPLICATION (HCC): Status: RESOLVED | Noted: 2023-04-27 | Resolved: 2023-07-22

## 2023-07-22 LAB
ANION GAP SERPL CALCULATED.3IONS-SCNC: 8 MMOL/L
BUN SERPL-MCNC: 7 MG/DL (ref 5–25)
CALCIUM SERPL-MCNC: 8.7 MG/DL (ref 8.4–10.2)
CHLORIDE SERPL-SCNC: 104 MMOL/L (ref 96–108)
CO2 SERPL-SCNC: 24 MMOL/L (ref 21–32)
CREAT SERPL-MCNC: 0.74 MG/DL (ref 0.6–1.3)
GFR SERPL CREATININE-BSD FRML MDRD: 113 ML/MIN/1.73SQ M
GLUCOSE SERPL-MCNC: 90 MG/DL (ref 65–140)
MAGNESIUM SERPL-MCNC: 2.1 MG/DL (ref 1.9–2.7)
POTASSIUM SERPL-SCNC: 3.6 MMOL/L (ref 3.5–5.3)
SODIUM SERPL-SCNC: 136 MMOL/L (ref 135–147)

## 2023-07-22 PROCEDURE — 99239 HOSP IP/OBS DSCHRG MGMT >30: CPT

## 2023-07-22 PROCEDURE — 80048 BASIC METABOLIC PNL TOTAL CA: CPT | Performed by: PHYSICIAN ASSISTANT

## 2023-07-22 PROCEDURE — 83735 ASSAY OF MAGNESIUM: CPT | Performed by: PHYSICIAN ASSISTANT

## 2023-07-22 RX ORDER — FOLIC ACID 1 MG/1
1 TABLET ORAL DAILY
Status: DISCONTINUED | OUTPATIENT
Start: 2023-07-22 | End: 2023-07-22 | Stop reason: HOSPADM

## 2023-07-22 RX ORDER — LANOLIN ALCOHOL/MO/W.PET/CERES
100 CREAM (GRAM) TOPICAL DAILY
Status: DISCONTINUED | OUTPATIENT
Start: 2023-07-22 | End: 2023-07-22 | Stop reason: HOSPADM

## 2023-07-22 RX ORDER — FLUOXETINE HYDROCHLORIDE 20 MG/1
20 CAPSULE ORAL DAILY
Qty: 30 CAPSULE | Refills: 0 | Status: SHIPPED | OUTPATIENT
Start: 2023-07-23 | End: 2023-08-22

## 2023-07-22 RX ADMIN — GUAIFENESIN 600 MG: 600 TABLET, EXTENDED RELEASE ORAL at 08:11

## 2023-07-22 RX ADMIN — THIAMINE HCL TAB 100 MG 100 MG: 100 TAB at 08:40

## 2023-07-22 RX ADMIN — ALBUTEROL SULFATE 2 PUFF: 90 AEROSOL, METERED RESPIRATORY (INHALATION) at 08:11

## 2023-07-22 RX ADMIN — AMLODIPINE BESYLATE 10 MG: 10 TABLET ORAL at 08:11

## 2023-07-22 RX ADMIN — FOLIC ACID 1 MG: 1 TABLET ORAL at 08:40

## 2023-07-22 RX ADMIN — FLUOXETINE 20 MG: 20 CAPSULE ORAL at 08:11

## 2023-07-22 RX ADMIN — NALTREXONE 380 MG: KIT at 10:41

## 2023-07-22 RX ADMIN — LOSARTAN POTASSIUM 25 MG: 25 TABLET, FILM COATED ORAL at 08:11

## 2023-07-22 RX ADMIN — NICOTINE POLACRILEX 4 MG: 4 GUM, CHEWING BUCCAL at 08:11

## 2023-07-22 NOTE — PLAN OF CARE
Problem: Potential for Falls  Goal: Patient will remain free of falls  Description: INTERVENTIONS:  - Educate patient/family on patient safety including physical limitations  - Instruct patient to call for assistance with activity   - Consult OT/PT to assist with strengthening/mobility   - Keep Call bell within reach  - Keep bed low and locked with side rails adjusted as appropriate  - Keep care items and personal belongings within reach  - Initiate and maintain comfort rounds    - Apply yellow socks and bracelet for high fall risk patients  - Consider moving patient to room near nurses station  Outcome: Completed

## 2023-07-22 NOTE — DISCHARGE SUMMARY
MEDICAL DETOX UNIT, LEVEL 4  Department of Medical Toxicology  Reason for Admission/Principal Problem: alcohol withdrawal   Admitting provider: Kylie Kat PA-C  No att. providers found   7/19/2023  5:59 PM       Discharging Physician / Practitioner: Kylie Kat PA-C  PCP: Chana Das MD  Admission Date:   Admission Orders (From admission, onward)     Ordered        07/19/23 1901  INPATIENT ADMISSION  Once                      Discharge Date: 07/22/23    Medical Problems     Resolved Problems  Date Reviewed: 4/29/2023          Resolved    * (Principal) Alcohol withdrawal syndrome with complication ([address]) 2/12/8504     Resolved by  Kylie Kat PA-C    Hypomagnesemia 7/22/2023     Resolved by  Kylie Kat PA-C    Hypokalemia 7/22/2023     Resolved by  Kylie Kat PA-C          Unspecified mood (affective) disorder [facility])  Assessment & Plan  · Per psychiatry patient has "a lot of trauma" that may be contributing to his drinking habits  · Psychiatry evaluated patient and are planning to start patient on 20 mg of Prozac; continue upon discharge   · Psychiatry also recommends outpatient psychiatry follow-up at this time  · Continue to monitor for SI and HI    Tobacco use disorder  Assessment & Plan  · Encouraged cessation  · Nicotine replacement therapy      Mild intermittent asthma without complication  Assessment & Plan  · Albuterol as needed  · Mucinex started  · Encouraged tobacco cessation    Primary hypertension  Assessment & Plan  · Home blood pressure medications continued upon admission    Transaminitis  Assessment & Plan  Recent Labs     07/19/23  1814 07/20/23  0543   * 83*   ALT 45 40   ALKPHOS 60 52       · Improving; likely secondary to chronic alcohol use  · Encourage alcohol cessation    Alcohol use disorder, severe, dependence ([address])  Assessment & Plan  · Continue vitamin supplementation  · Case management consulted for disposition planning - OP Ethos  · Received Vivitrol upon discharge       Hypokalemia-resolved as of 7/22/2023  Assessment & Plan  Recent Labs     07/19/23  1814 07/20/23  0543 07/21/23  0531 07/22/23  0545   K 3.0* 3.8 3.1* 3.6     · Will continue to optimize as needed    Hypomagnesemia-resolved as of 7/22/2023  Assessment & Plan  Recent Labs     07/19/23  1814 07/20/23  0543 07/21/23  0531   MG 1.7* 2.0 1.8*     · Will continue to optimize as needed    * Alcohol withdrawal syndrome with complication ([facility])-resolved as of 7/22/2023  Assessment & Plan  · 12-14 shots of vodka daily  · Last drink was 7/19 in the afternoon  · EtOH 395 on admission  · Last admission was 4/23. · Has received 910 of phenobarbital total   · SEWS protocol discontinued on 7/21/23  · Patient no longer displays signs or symptoms of alcohol withdrawal  · Received a total of 1039.2 mg of phenobarbital. Last dose administered at 2 pm on 7/21/23. Has been monitored off of protocol, stable from withdrawal perspective           Consultations During Hospital Stay:  · None     Procedures Performed:   · None     Significant Findings / Test Results:   · Hypomagnesmia   · Hypokalemia     Incidental Findings:   · None     Test Results Pending at Discharge (will require follow up): · None      Outpatient Tests Requested:  · None    Complications:  none    Reason for Admission: alcohol withdrawal     Hospital Course: Jessica Luna is a 43 y.o. male patient who originally presented to the hospital on 7/19/2023 due to alcohol withdrawal. Patient initially presented to the [facility] ED requesting detoxification from alcohol. Patient was admitted to the [facility] medical detox unit under SEWS protocol for medically assisted alcohol withdrawal and received a total of 1040 mg phenobarbital without complication. Patient's alcohol withdrawal symptoms subsequently resolved, and he has remained without objective evidence of alcohol withdrawal at this time.  During this hospitalization, patient was found to have hypokalemia and hypomagnesmia, which resolved with electrolyte supplementation. Case management was consulted for assistance with aftercare resources, and patient will be discharged with outpatient services. Please see above list of diagnoses and related plan for additional information. Condition at Discharge: stable     Discharge Day Visit / Exam:     Subjective:  Patient denies further withdrawal symptoms. He is interested in receiving Vivitrol upon discharge and will follow up with [facility]. Vitals: Blood Pressure: (!) 153/108 (07/22/23 0748)  Pulse: 71 (07/22/23 0748)  Temperature: 97.8 °F (36.6 °C) (07/22/23 0748)  Temp Source: Temporal (07/22/23 0748)  Respirations: 18 (07/22/23 0748)  Height: 5' 7" (170.2 cm) (07/19/23 2040)  Weight - Scale: 68.5 kg (151 lb) (07/19/23 2040)  SpO2: 97 % (07/22/23 0748)  Exam:   Physical Exam  Constitutional:       General: He is not in acute distress. Appearance: He is not diaphoretic. Eyes:      General: No scleral icterus. Pupils: Pupils are equal, round, and reactive to light. Cardiovascular:      Rate and Rhythm: Normal rate and regular rhythm. Heart sounds: No murmur heard. Pulmonary:      Effort: No respiratory distress. Breath sounds: Normal breath sounds. No wheezing. Abdominal:      General: Bowel sounds are normal. There is no distension. Palpations: Abdomen is soft. Tenderness: There is no abdominal tenderness. Neurological:      Mental Status: He is alert and oriented to person, place, and time. Motor: No tremor. Psychiatric:         Mood and Affect: Mood is anxious. Mood is not depressed. Discussion with Family: Discussed with patient     Discharge instructions/Information to patient and family:   See after visit summary for information provided to patient and family.       Provisions for Follow-Up Care:  See after visit summary for information related to follow-up care and any pertinent home health orders. Disposition:     Home    For Discharges to [facility] SNF:   · Not Applicable to this Patient - Not Applicable to this Patient    Planned Readmission: none      Discharge Statement:  I spent 35 minutes discharging the patient. This time was spent on the day of discharge. I had direct contact with the patient on the day of discharge. Greater than 50% of the total time was spent examining patient, answering all patient questions, arranging and discussing plan of care with patient as well as directly providing post-discharge instructions. Additional time then spent on discharge activities. Discharge Medications:  See after visit summary for reconciled discharge medications provided to patient and family.       ** Please Note: This note has been constructed using a voice recognition system **

## 2023-07-22 NOTE — PROGRESS NOTES
07/22/23 0904   Discharge Planning   Living Arrangements Lives w/ Spouse/significant other;Lives w/ Children   Support Systems Self;Spouse/significant other;Children   Type of Current Residence Private residence   Current [id number] [facility]IC No   Other Referral/Resources/Interventions Provided:   Referrals Provided: Crisis Hotline; Other (Specify)  (OP D&A, MAT)   Discharge Communications   Discharge planning discussed with: Patient, Wife   Freedom of Choice Yes   Contacts   Patient Contacts Wife   Relationship to Patient: Family   [facility] Medication Program   Would you like to participate in our [employer] service program?   No - Declined       Patient cleared to d/c home today. Wife will provide transportation. The patient is scheduled with [facility] for outpatient MAT and Psychiatry on 7/26/23 @ 10am. Patient has no further questions or concerns for CM.

## 2023-07-24 ENCOUNTER — TRANSITIONAL CARE MANAGEMENT (OUTPATIENT)
Dept: FAMILY MEDICINE CLINIC | Facility: CLINIC | Age: 43
End: 2023-07-24

## 2023-08-11 ENCOUNTER — HOSPITAL ENCOUNTER (EMERGENCY)
Facility: HOSPITAL | Age: 43
Discharge: HOME | End: 2023-08-11
Attending: EMERGENCY MEDICINE
Payer: COMMERCIAL

## 2023-08-11 VITALS
WEIGHT: 153.8 LBS | TEMPERATURE: 98 F | DIASTOLIC BLOOD PRESSURE: 117 MMHG | HEART RATE: 85 BPM | SYSTOLIC BLOOD PRESSURE: 152 MMHG | OXYGEN SATURATION: 95 % | BODY MASS INDEX: 24.09 KG/M2 | RESPIRATION RATE: 18 BRPM

## 2023-08-11 DIAGNOSIS — F10.10 ALCOHOL ABUSE: Primary | ICD-10-CM

## 2023-08-11 DIAGNOSIS — E83.42 HYPOMAGNESEMIA: ICD-10-CM

## 2023-08-11 LAB
ALBUMIN SERPL BCP-MCNC: 4.3 G/DL (ref 3.5–5)
ALP SERPL-CCNC: 53 U/L (ref 34–104)
ALT SERPL W P-5'-P-CCNC: 25 U/L (ref 7–52)
ANION GAP SERPL CALCULATED.3IONS-SCNC: 12 MMOL/L
AST SERPL W P-5'-P-CCNC: 37 U/L (ref 13–39)
ATRIAL RATE: 78 BPM
BASOPHILS # BLD AUTO: 0.05 THOUSANDS/ÂΜL (ref 0–0.1)
BASOPHILS NFR BLD AUTO: 1 % (ref 0–1)
BILIRUB SERPL-MCNC: 0.38 MG/DL (ref 0.2–1)
BUN SERPL-MCNC: 10 MG/DL (ref 5–25)
CALCIUM SERPL-MCNC: 8.9 MG/DL (ref 8.4–10.2)
CHLORIDE SERPL-SCNC: 102 MMOL/L (ref 96–108)
CO2 SERPL-SCNC: 30 MMOL/L (ref 21–32)
CREAT SERPL-MCNC: 1.1 MG/DL (ref 0.6–1.3)
EOSINOPHIL # BLD AUTO: 0.08 THOUSAND/ÂΜL (ref 0–0.61)
EOSINOPHIL NFR BLD AUTO: 1 % (ref 0–6)
ERYTHROCYTE [DISTWIDTH] IN BLOOD BY AUTOMATED COUNT: 13 % (ref 11.6–15.1)
ETHANOL EXG-MCNC: 0.13 MG/DL
GFR SERPL CREATININE-BSD FRML MDRD: 82 ML/MIN/1.73SQ M
GLUCOSE SERPL-MCNC: 111 MG/DL (ref 65–140)
HCT VFR BLD AUTO: 45.6 % (ref 36.5–49.3)
HGB BLD-MCNC: 15.4 G/DL (ref 12–17)
IMM GRANULOCYTES # BLD AUTO: 0.02 THOUSAND/UL (ref 0–0.2)
IMM GRANULOCYTES NFR BLD AUTO: 0 % (ref 0–2)
LYMPHOCYTES # BLD AUTO: 1.42 THOUSANDS/ÂΜL (ref 0.6–4.47)
LYMPHOCYTES NFR BLD AUTO: 24 % (ref 14–44)
MAGNESIUM SERPL-MCNC: 1.6 MG/DL (ref 1.9–2.7)
MCH RBC QN AUTO: 32.9 PG (ref 26.8–34.3)
MCHC RBC AUTO-ENTMCNC: 33.8 G/DL (ref 31.4–37.4)
MCV RBC AUTO: 97 FL (ref 82–98)
MONOCYTES # BLD AUTO: 0.46 THOUSAND/ÂΜL (ref 0.17–1.22)
MONOCYTES NFR BLD AUTO: 8 % (ref 4–12)
NEUTROPHILS # BLD AUTO: 3.89 THOUSANDS/ÂΜL (ref 1.85–7.62)
NEUTS SEG NFR BLD AUTO: 66 % (ref 43–75)
NRBC BLD AUTO-RTO: 0 /100 WBCS
P AXIS: 45 DEGREES
PLATELET # BLD AUTO: 201 THOUSANDS/UL (ref 149–390)
PMV BLD AUTO: 8.1 FL (ref 8.9–12.7)
POTASSIUM SERPL-SCNC: 4.2 MMOL/L (ref 3.5–5.3)
PR INTERVAL: 156 MS
PROT SERPL-MCNC: 7.1 G/DL (ref 6.4–8.4)
QRS AXIS: -9 DEGREES
QRSD INTERVAL: 96 MS
QT INTERVAL: 370 MS
QTC INTERVAL: 421 MS
RBC # BLD AUTO: 4.68 MILLION/UL (ref 3.88–5.62)
SODIUM SERPL-SCNC: 144 MMOL/L (ref 135–147)
T WAVE AXIS: 29 DEGREES
VENTRICULAR RATE: 78 BPM
WBC # BLD AUTO: 5.92 THOUSAND/UL (ref 4.31–10.16)

## 2023-08-11 PROCEDURE — 93010 ELECTROCARDIOGRAM REPORT: CPT

## 2023-08-11 PROCEDURE — 96361 HYDRATE IV INFUSION ADD-ON: CPT

## 2023-08-11 PROCEDURE — 83735 ASSAY OF MAGNESIUM: CPT | Performed by: EMERGENCY MEDICINE

## 2023-08-11 PROCEDURE — 96365 THER/PROPH/DIAG IV INF INIT: CPT

## 2023-08-11 PROCEDURE — 99285 EMERGENCY DEPT VISIT HI MDM: CPT | Performed by: EMERGENCY MEDICINE

## 2023-08-11 PROCEDURE — 85025 COMPLETE CBC W/AUTO DIFF WBC: CPT | Performed by: EMERGENCY MEDICINE

## 2023-08-11 PROCEDURE — 36415 COLL VENOUS BLD VENIPUNCTURE: CPT | Performed by: EMERGENCY MEDICINE

## 2023-08-11 PROCEDURE — 80053 COMPREHEN METABOLIC PANEL: CPT | Performed by: EMERGENCY MEDICINE

## 2023-08-11 PROCEDURE — 82075 ASSAY OF BREATH ETHANOL: CPT | Performed by: EMERGENCY MEDICINE

## 2023-08-11 PROCEDURE — 93005 ELECTROCARDIOGRAM TRACING: CPT

## 2023-08-11 PROCEDURE — 99283 EMERGENCY DEPT VISIT LOW MDM: CPT

## 2023-08-11 RX ORDER — POLYMYXIN B SULFATE AND TRIMETHOPRIM 1; 10000 MG/ML; [USP'U]/ML
1 SOLUTION OPHTHALMIC EVERY 6 HOURS
COMMUNITY
Start: 2023-08-09 | End: 2023-08-16

## 2023-08-11 RX ORDER — DIAZEPAM 5 MG/1
10 TABLET ORAL ONCE
Status: COMPLETED | OUTPATIENT
Start: 2023-08-11 | End: 2023-08-11

## 2023-08-11 RX ORDER — MAGNESIUM SULFATE 1 G/100ML
1 INJECTION INTRAVENOUS ONCE
Status: COMPLETED | OUTPATIENT
Start: 2023-08-11 | End: 2023-08-11

## 2023-08-11 RX ADMIN — SODIUM CHLORIDE 1000 ML: 0.9 INJECTION, SOLUTION INTRAVENOUS at 09:42

## 2023-08-11 RX ADMIN — MAGNESIUM SULFATE 1 G: 1 INJECTION INTRAVENOUS at 11:22

## 2023-08-11 RX ADMIN — DIAZEPAM 10 MG: 5 TABLET ORAL at 09:42

## 2023-08-11 NOTE — CERTIFIED RECOVERY SPECIALIST
Certified  Note    Patient name: Jessica Luna  Location: [address] [place] [address] St: [address] [facility]  Attending:  Lilly Hernandez MD MRN [id number]  : 1980  Age: 43 y.o. Sex: male Date 2023         Substance Use History:     Social History     Substance and Sexual Activity   Alcohol Use Yes    Comment: 10-12 shots of vodka and 4-6 beers        Social History     Substance and Sexual Activity   Drug Use Never       Admission Information  Substances Used at This Admission[de-identified] Alcohol  Readmission in Last 30 Days?: Yes  Encounter Type[de-identified] Patient Face-to-Face    Recovery Support Plan  Declined All Services?: No  Medication Assisted Treatment[de-identified] No  Agreeable to Warm Handoff?: Yes  Is Patient Accepting [facility] Services?: No  Was Referral Made to [address] [place] [address]?: No  Was Narcan Provided at Discharge?: No  Plan Discussed With Treatment Team[de-identified] Yes  Plan Discussed With[de-identified] Provider    Referral to Recovery Supports:  [address][de-identified] Yes  Community Based CRS[de-identified] No  Case Management[de-identified] No  Direct Access to [facility] Treatment?: Yes  Resource Guide Given?: Yes  Follow Up With Patient[de-identified] No  Family / Other Support[de-identified] No  Referral for Community Physical Health[de-identified] No  Referral for Community Mental Health[de-identified] No'    CRS met with patient in waiting room at [facility]. CRS provided introductions and explanation of service. CRS pursued patient surrounding visit to ED. Patient shared his recent experiences and needing something more than detox. Patient disclosed SO also feels he needs rehab. CRS provided patient with contact information. CRS will provide recovery resources to be added to d/c documentation. CRS updated provider of conversation.   Provider entered consult for .      Liz Rogers

## 2023-08-11 NOTE — ED CARE HANDOFF
[facility] Warm Handoff Outcome Note    Patient name Cory Mahoney  Location Z1H3/Z1H3 MRN [id number]  Age: 43 y.o. Plan Type:   Warm Handoff                                                                                    Plan Date: 8/11/2023  Service:  ED Warm Handoff      Substance Use History:  ETOH    Warm Handoff Update:  Pt accepted bed at rehab (unknown)    Warm Handoff Outcome: Residential  Inpatient

## 2023-08-11 NOTE — ED PROVIDER NOTES
History  Chief Complaint   Patient presents with   • Detox Evaluation     Patient requesting detox from alcohol; 15 shots of vodka/day for the past week; was d/c from IP detox, was sober for about a week, then went on vacation about a week ago and relapsed; no hx of wd seizures; denies SI/HI/VH/AH     42 yo male with a history of GERD, HTN, and alcoholism presents to the ED requesting readmission to the Detox Unit. The patient was admitted to the unit last month then discharged to home after declining placement in a rehab facility. He says he was sober for "about a week" then started drinking again during a vacation. He has had roughly 15 shots of vodka daily for the past 10-11 days. Last drink was last night "sometime" --> he does not remember because he passed out. At present he feels "like shit". (+) Mild tremors. No nausea, vomiting, or diarrhea. No diaphoresis. He denies SI, HI, and hallucinations. No history of alcohol withdrawal seizures. No other specific complaints. Prior to Admission Medications   Prescriptions Last Dose Informant Patient Reported? Taking? FLUoxetine (PROzac) 20 mg capsule   No No   Sig: Take 1 capsule (20 mg total) by mouth daily Do not start before July 23, 2023.    albuterol (Ventolin HFA) 90 mcg/act inhaler   No No   Sig: Inhale 2 puffs every 6 (six) hours as needed for wheezing   amLODIPine (NORVASC) 10 mg tablet   No No   Sig: Take 1 tablet (10 mg total) by mouth daily   hydrOXYzine HCL (ATARAX) 50 mg tablet   No No   Sig: Take 1 tablet (50 mg total) by mouth every 6 (six) hours as needed for anxiety   losartan (COZAAR) 25 mg tablet   No No   Sig: Take 1 tablet (25 mg total) by mouth daily   nicotine (NICODERM CQ) 21 mg/24 hr TD 24 hr patch   No No   Sig: Place 1 patch on the skin every 24 hours   polymyxin b-trimethoprim (POLYTRIM) ophthalmic solution   Yes Yes   Sig: Apply 1 drop to eye every 6 (six) hours   traZODone (DESYREL) 50 mg tablet   No No   Sig: Take 1 tablet (50 mg total) by mouth daily at bedtime as needed for sleep      Facility-Administered Medications: None       Past Medical History:   Diagnosis Date   • GERD (gastroesophageal reflux disease)    • Hypertension    • Rectal bleed        Past Surgical History:   Procedure Laterality Date   • EYE SURGERY     • SHOULDER SURGERY Right 12/08/2022       Family History   Problem Relation Age of Onset   • Hypertension Father    • Colon cancer Paternal Aunt      I have reviewed and agree with the history as documented. E-Cigarette/Vaping   • E-Cigarette Use Never User      E-Cigarette/Vaping Substances   • Nicotine Yes    • THC No    • CBD No    • Flavoring No    • Other No    • Unknown No      Social History     Tobacco Use   • Smoking status: Some Days     Packs/day: 1.00     Types: Cigarettes   • Smokeless tobacco: Never   Vaping Use   • Vaping Use: Never used   Substance Use Topics   • Alcohol use: Yes     Comment: 15 shots of vodka/day   • Drug use: Never       Review of Systems   Constitutional: Negative for chills, diaphoresis and fever. HENT: Negative for sore throat. Respiratory: Negative for cough and shortness of breath. Cardiovascular: Negative for chest pain and palpitations. Gastrointestinal: Negative for abdominal pain, diarrhea, nausea and vomiting. Endocrine: Negative for cold intolerance and heat intolerance. Genitourinary: Negative for dysuria and flank pain. Musculoskeletal: Negative for back pain. Skin: Negative for rash. Allergic/Immunologic: Negative for immunocompromised state. Neurological: Positive for tremors. Negative for headaches. Hematological: Negative for adenopathy. Psychiatric/Behavioral: The patient is not nervous/anxious. Physical Exam  Physical Exam  Constitutional:       General: He is not in acute distress. Appearance: He is well-developed. HENT:      Head: Normocephalic and atraumatic.    Eyes:      Pupils: Pupils are equal, round, and reactive to light. Cardiovascular:      Rate and Rhythm: Normal rate and regular rhythm. Pulmonary:      Effort: Pulmonary effort is normal. No respiratory distress. Breath sounds: Normal breath sounds. Abdominal:      General: There is no distension. Palpations: Abdomen is soft. Tenderness: There is no abdominal tenderness. Musculoskeletal:         General: Normal range of motion. Cervical back: Normal range of motion and neck supple. Skin:     General: Skin is warm and dry. Neurological:      Mental Status: He is alert and oriented to person, place, and time.          Vital Signs  ED Triage Vitals [08/11/23 0919]   Temperature Pulse Respirations Blood Pressure SpO2   98 °F (36.7 °C) 85 18 (!) 152/117 95 %      Temp Source Heart Rate Source Patient Position - Orthostatic VS BP Location FiO2 (%)   Tympanic Monitor Sitting Right arm --      Pain Score       --           Vitals:    08/11/23 0919   BP: (!) 152/117   Pulse: 85   Patient Position - Orthostatic VS: Sitting         Visual Acuity      ED Medications  Medications   sodium chloride 0.9 % bolus 1,000 mL (0 mL Intravenous Stopped 8/11/23 1043)   diazepam (VALIUM) tablet 10 mg (10 mg Oral Given 8/11/23 0942)   magnesium sulfate IVPB (premix) SOLN 1 g (0 g Intravenous Stopped 8/11/23 1207)       Diagnostic Studies  Results Reviewed     Procedure Component Value Units Date/Time    Comprehensive metabolic panel [[id number]] Collected: 08/11/23 0934    Lab Status: Final result Specimen: Blood from Arm, Left Updated: 08/11/23 1017     Sodium 144 mmol/L      Potassium 4.2 mmol/L      Chloride 102 mmol/L      CO2 30 mmol/L      ANION GAP 12 mmol/L      BUN 10 mg/dL      Creatinine 1.10 mg/dL      Glucose 111 mg/dL      Calcium 8.9 mg/dL      AST 37 U/L      ALT 25 U/L      Alkaline Phosphatase 53 U/L      Total Protein 7.1 g/dL      Albumin 4.3 g/dL      Total Bilirubin 0.38 mg/dL      eGFR 82 ml/min/1.73sq m     Narrative:      National Kidney Disease Foundation guidelines for Chronic Kidney Disease (CKD):   •  Stage 1 with normal or high GFR (GFR > 90 mL/min/1.73 square meters)  •  Stage 2 Mild CKD (GFR = 60-89 mL/min/1.73 square meters)  •  Stage 3A Moderate CKD (GFR = 45-59 mL/min/1.73 square meters)  •  Stage 3B Moderate CKD (GFR = 30-44 mL/min/1.73 square meters)  •  Stage 4 Severe CKD (GFR = 15-29 mL/min/1.73 square meters)  •  Stage 5 End Stage CKD (GFR <15 mL/min/1.73 square meters)  Note: GFR calculation is accurate only with a steady state creatinine    Magnesium [[id number]]  (Abnormal) Collected: 08/11/23 0934    Lab Status: Final result Specimen: Blood from Arm, Left Updated: 08/11/23 1017     Magnesium 1.6 mg/dL     POCT alcohol breath test [[id number]]  (Normal) Resulted: 08/11/23 0941    Lab Status: Final result Updated: 08/11/23 0945     EXTBreath Alcohol 0.127    CBC and differential [[id number]]  (Abnormal) Collected: 08/11/23 0934    Lab Status: Final result Specimen: Blood from Arm, Left Updated: 08/11/23 0939     WBC 5.92 Thousand/uL      RBC 4.68 Million/uL      Hemoglobin 15.4 g/dL      Hematocrit 45.6 %      MCV 97 fL      MCH 32.9 pg      MCHC 33.8 g/dL      RDW 13.0 %      MPV 8.1 fL      Platelets 712 Thousands/uL      nRBC 0 /100 WBCs      Neutrophils Relative 66 %      Immat GRANS % 0 %      Lymphocytes Relative 24 %      Monocytes Relative 8 %      Eosinophils Relative 1 %      Basophils Relative 1 %      Neutrophils Absolute 3.89 Thousands/µL      Immature Grans Absolute 0.02 Thousand/uL      Lymphocytes Absolute 1.42 Thousands/µL      Monocytes Absolute 0.46 Thousand/µL      Eosinophils Absolute 0.08 Thousand/µL      Basophils Absolute 0.05 Thousands/µL                  No orders to display              Procedures  ECG 12 Lead Documentation Only    Date/Time: 8/11/2023 9:47 AM    Performed by: Francisca Rodriguez MD  Authorized by: Francisca Rodriguez MD    Indications / Diagnosis:  Alcohol withdrawal  ECG reviewed by me, the ED Provider: yes    Patient location:  ED  Interpretation:     Interpretation: non-specific    Rate:     ECG rate:  78 bpm    ECG rate assessment: normal    Rhythm:     Rhythm: sinus rhythm    Ectopy:     Ectopy: none    QRS:     QRS axis:  Normal    QRS intervals:  Normal  Conduction:     Conduction: normal    ST segments:     ST segments:  Non-specific  T waves:     T waves: normal               ED Course  ED Course as of 08/12/23 0822   Fri Aug 11, 2023   1124 EXTBreath Alcohol: 0.127   1124 Magnesium(!): 1.6                               SBIRT 22yo+    Flowsheet Row Most Recent Value   Initial Alcohol Screen: US AUDIT-C     1. How often do you have a drink containing alcohol? 6 Filed at: 08/11/2023 0921   2. How many drinks containing alcohol do you have on a typical day you are drinking? 6 Filed at: 08/11/2023 0921   3a. Male UNDER 65: How often do you have five or more drinks on one occasion? 6 Filed at: 08/11/2023 0921   3b. FEMALE Any Age, or MALE 65+: How often do you have 4 or more drinks on one occassion? 0 Filed at: 08/11/2023 0921   Audit-C Score 18 Filed at: 08/11/2023 9970   Full Alcohol Screen: US AUDIT    4. How often during the last year have you found that you were not able to stop drinking once you had started? 3 Filed at: 08/11/2023 0921   5. How often during past year have you failed to do what was normally expected of you because of drinking? 3 Filed at: 08/11/2023 0921   6. How often in past year have you needed a first drink in the morning to get yourself going after a heavy drinking session? 3 Filed at: 08/11/2023 0921   7. How often in past year have you had feeling of guilt or remorse after drinking? 4 Filed at: 08/11/2023 0921   8. How often in past year have you been unable to remember what happened night before because you had been drinking? 0 Filed at: 08/11/2023 0921   9. Have you or someone else been injured as a result of your drinking? 0 Filed at: 08/11/2023 0921   10.  Has a relative, friend, doctor or other health worker been concerned about your drinking and suggested you cut down? 4 Filed at: 08/11/2023 6212   AUDIT Total Score 35 Filed at: 08/11/2023 2944   KM: How many times in the past year have you. .. Used an illegal drug or used a prescription medication for non-medical reasons? Never Filed at: 08/11/2023 6821                    Medical Decision Making  The patient is comfortable appearing with stable vital signs and a benign exam. No tremors or diaphoresis observed. Will reach out to Detox to discuss the case. EKG, basic labs, mag, UDS, and breath alcohol ordered. IVFs and valium administered. Will continue to monitor in the ED. Disposition per workup, reassessment, and consultant recommendations. 2578 Case discussed with the Detox AP. They state the patient is at low risk for significant withdrawal and doesn't have any conditions requiring medical care/monitoring. If labs are unremarkable then plan for HOST referral. Will continue to monitor in the ED.    1115 The patient was accepted at a local rehab facility. Workup is only significant for a mild hypomagnesemia. Case again discussed with Detox. Plan for Mg repletion in the ED then discharge. The patient's wife will transport him to the rehab via private vehicle. Strict return precautions provided. Alcohol abuse: chronic illness or injury with severe exacerbation, progression, or side effects of treatment  Hypomagnesemia: complicated acute illness or injury  Amount and/or Complexity of Data Reviewed  Labs: ordered. Decision-making details documented in ED Course. ECG/medicine tests: ordered and independent interpretation performed. Risk  Prescription drug management.           Disposition  Final diagnoses:   Alcohol abuse   Hypomagnesemia     Time reflects when diagnosis was documented in both MDM as applicable and the Disposition within this note     Time User Action Codes Description Comment    8/11/2023 11:24 AM Coretha Cloud Add [F10.929] Alcohol intoxication ([address])     8/11/2023 11:24 AM Domenic Vanegas Remove [F10.929] Alcohol intoxication ([address])     8/11/2023 11:24 AM Coretha Cloud Add [F10.10] Alcohol abuse     8/11/2023 11:24 AM Coretha Cloud Add [E83.42] Hypomagnesemia       ED Disposition     ED Disposition   Discharge    Condition   Stable    Date/Time   Fri Aug 11, 2023 11:24 AM    Comment   Verónica Handsome discharge to home/self care.                Follow-up Information     Follow up With Specialties Details Why Contact Info    Ivelisse Ashton MD Internal Medicine Schedule an appointment as soon as possible for a visit   [address] [place] [address]  [phone]            Discharge Medication List as of 8/11/2023 12:03 PM      CONTINUE these medications which have NOT CHANGED    Details   polymyxin b-trimethoprim (POLYTRIM) ophthalmic solution Apply 1 drop to eye every 6 (six) hours, Starting Wed 8/9/2023, Until Wed 8/16/2023, Historical Med      albuterol (Ventolin HFA) 90 mcg/act inhaler Inhale 2 puffs every 6 (six) hours as needed for wheezing, Starting Sun 4/30/2023, Normal      amLODIPine (NORVASC) 10 mg tablet Take 1 tablet (10 mg total) by mouth daily, Starting Tue 12/6/2022, Normal      FLUoxetine (PROzac) 20 mg capsule Take 1 capsule (20 mg total) by mouth daily Do not start before July 23, 2023., Starting Sun 7/23/2023, Until Tue 8/22/2023, Normal      hydrOXYzine HCL (ATARAX) 50 mg tablet Take 1 tablet (50 mg total) by mouth every 6 (six) hours as needed for anxiety, Starting Wed 5/17/2023, Normal      losartan (COZAAR) 25 mg tablet Take 1 tablet (25 mg total) by mouth daily, Starting Wed 5/17/2023, Normal      nicotine (NICODERM CQ) 21 mg/24 hr TD 24 hr patch Place 1 patch on the skin every 24 hours, Starting Thu 2/25/2021, Normal      traZODone (DESYREL) 50 mg tablet Take 1 tablet (50 mg total) by mouth daily at bedtime as needed for sleep, Starting Wed 5/17/2023, Normal             No discharge procedures on file.     PDMP Review       Value Time User    PDMP Reviewed  Yes 7/22/2023  8:58 AM Wing Ashlyn PA-C          ED Provider  Electronically Signed by           Oxana Beltran MD  08/12/23 2906

## 2023-09-20 ENCOUNTER — OFFICE VISIT (OUTPATIENT)
Dept: FAMILY MEDICINE CLINIC | Facility: CLINIC | Age: 43
End: 2023-09-20
Payer: COMMERCIAL

## 2023-09-20 ENCOUNTER — PATIENT OUTREACH (OUTPATIENT)
Dept: FAMILY MEDICINE CLINIC | Facility: CLINIC | Age: 43
End: 2023-09-20

## 2023-09-20 VITALS
HEIGHT: 67 IN | DIASTOLIC BLOOD PRESSURE: 80 MMHG | HEART RATE: 78 BPM | RESPIRATION RATE: 18 BRPM | SYSTOLIC BLOOD PRESSURE: 112 MMHG | WEIGHT: 170 LBS | TEMPERATURE: 98.1 F | OXYGEN SATURATION: 98 % | BODY MASS INDEX: 26.68 KG/M2

## 2023-09-20 DIAGNOSIS — Z11.59 NEED FOR HEPATITIS C SCREENING TEST: ICD-10-CM

## 2023-09-20 DIAGNOSIS — F10.90 CHRONIC ALCOHOL USE: ICD-10-CM

## 2023-09-20 DIAGNOSIS — Z13.220 LIPID SCREENING: ICD-10-CM

## 2023-09-20 DIAGNOSIS — Z13.1 SCREENING FOR DIABETES MELLITUS: ICD-10-CM

## 2023-09-20 DIAGNOSIS — I10 PRIMARY HYPERTENSION: Primary | ICD-10-CM

## 2023-09-20 DIAGNOSIS — Z13.29 THYROID DISORDER SCREENING: ICD-10-CM

## 2023-09-20 DIAGNOSIS — E55.9 VITAMIN D DEFICIENCY: ICD-10-CM

## 2023-09-20 DIAGNOSIS — Z11.59 NEED FOR HEPATITIS B SCREENING TEST: ICD-10-CM

## 2023-09-20 DIAGNOSIS — Z72.0 TOBACCO USE: ICD-10-CM

## 2023-09-20 DIAGNOSIS — Z78.9 NEED FOR FOLLOW-UP BY SOCIAL WORKER: Primary | ICD-10-CM

## 2023-09-20 PROCEDURE — 99214 OFFICE O/P EST MOD 30 MIN: CPT | Performed by: INTERNAL MEDICINE

## 2023-09-20 RX ORDER — METHOCARBAMOL 500 MG/1
TABLET, FILM COATED ORAL
COMMUNITY
Start: 2023-09-03

## 2023-09-20 RX ORDER — TRAZODONE HYDROCHLORIDE 100 MG/1
TABLET ORAL
COMMUNITY
Start: 2023-08-12

## 2023-09-20 RX ORDER — CYANOCOBALAMIN 1000 UG/ML
INJECTION, SOLUTION INTRAMUSCULAR; SUBCUTANEOUS
COMMUNITY
Start: 2023-09-08

## 2023-09-20 RX ORDER — LACTOSE-REDUCED FOOD
LIQUID (ML) ORAL
COMMUNITY
Start: 2023-09-10

## 2023-09-20 RX ORDER — FOLIC ACID 1 MG/1
TABLET ORAL
COMMUNITY
Start: 2023-09-02

## 2023-09-20 RX ORDER — CLONIDINE HYDROCHLORIDE 0.1 MG/1
TABLET ORAL
COMMUNITY

## 2023-09-20 RX ORDER — NICOTINE POLACRILEX 4 MG/1
GUM, CHEWING ORAL
COMMUNITY
Start: 2023-09-08

## 2023-09-20 RX ORDER — GAUZE BANDAGE 2" X 2"
100 BANDAGE TOPICAL DAILY
Qty: 90 TABLET | Refills: 1 | Status: SHIPPED | OUTPATIENT
Start: 2023-09-20

## 2023-09-20 RX ORDER — LANOLIN ALCOHOL/MO/W.PET/CERES
CREAM (GRAM) TOPICAL
COMMUNITY
Start: 2023-09-11

## 2023-09-20 RX ORDER — QUETIAPINE FUMARATE 150 MG/1
TABLET, FILM COATED ORAL
COMMUNITY
Start: 2023-08-11

## 2023-09-20 RX ORDER — FLUOXETINE HYDROCHLORIDE 40 MG/1
CAPSULE ORAL
COMMUNITY

## 2023-09-20 RX ORDER — HYDROXYZINE PAMOATE 50 MG/1
CAPSULE ORAL
COMMUNITY
Start: 2023-08-12

## 2023-09-20 NOTE — PROGRESS NOTES
Assessment/Plan:           Problem List Items Addressed This Visit        Cardiovascular and Mediastinum    Primary hypertension - Primary    Relevant Medications    cloNIDine (CATAPRES) 0.1 mg tablet    Other Relevant Orders    CBC and differential    Comprehensive metabolic panel   Other Visit Diagnoses     Vitamin D deficiency        Relevant Orders    Vitamin D Panel    Thyroid disorder screening        Relevant Orders    TSH, 3rd generation    Screening for diabetes mellitus        Relevant Orders    UA (URINE) with reflex to Scope    Chronic alcohol use        Relevant Medications    Thiamine Mononitrate (VITAMIN B1) 100 mg tablet    Other Relevant Orders    Comprehensive metabolic panel    Vitamin D Panel    Lipase    Amylase    Need for hepatitis C screening test        Relevant Orders    Chronic Hepatitis Panel    Need for hepatitis B screening test        Relevant Orders    Chronic Hepatitis Panel    Lipid screening        Relevant Orders    Lipid panel            Subjective:      Patient ID: Karen Romero is a 37 y.o. male. HPI  Patient with hypertension and history of chronic alcohol use is to follow-up on chronic medical problems. Blood pressure is excellent today. He has been sober for over a month now. His last ER visit was noted. He was started on Seroquel and trazodone and Prozac through his rehabilitation in [facility]. Is following through IOP. Unfortunately has not been able to get a local psychiatrist.  We will try to navigate through care coordinators and try to get him in. He is still smoking but has cut back. He reports no dyspepsia or heartburn abdominal pain bowel changes melena hematochezia. He is willing to proceed with a liver ultrasound. Blood work will be reordered. Be following up with his orthopedic for right shoulder complaints.   The following portions of the patient's history were reviewed and updated as appropriate: allergies, current medications, past medical history, past social history, past surgical history and problem list.    Review of Systems      Objective:      /80   Pulse 78   Temp 98.1 °F (36.7 °C)   Resp 18   Ht 5' 7" (1.702 m)   Wt 77.1 kg (170 lb)   SpO2 98%   BMI 26.63 kg/m²          Physical Exam  Cardiovascular:      Rate and Rhythm: Normal rate and regular rhythm. Heart sounds: Normal heart sounds. No murmur heard. Pulmonary:      Effort: No respiratory distress. Breath sounds: Normal breath sounds. No wheezing or rales. Abdominal:      General: There is no distension. Palpations: There is no mass. Tenderness: There is no abdominal tenderness. There is no guarding. Comments: No hepatomegaly noted   Neurological:      Mental Status: He is alert and oriented to person, place, and time.       Comments: Minimal tremors

## 2023-09-21 ENCOUNTER — TELEPHONE (OUTPATIENT)
Dept: FAMILY MEDICINE CLINIC | Facility: CLINIC | Age: 43
End: 2023-09-21

## 2023-09-21 DIAGNOSIS — B37.0 THRUSH: Primary | ICD-10-CM

## 2023-09-22 ENCOUNTER — PATIENT OUTREACH (OUTPATIENT)
Dept: FAMILY MEDICINE CLINIC | Facility: CLINIC | Age: 43
End: 2023-09-22

## 2023-09-22 NOTE — PROGRESS NOTES
ARTEMIO CHAHAL was forwarded a message from Dr. Lisa Barfield regarding patient doing well right now in recovery from alcohol use but wanted patient to be connected with services to avoid relapse. ARTEMIO CHAHAL noted in chart that there has been utilization and one detox admission since last outreach. ARTEMIO CHAHAL placed call to the patient, Rebecca Sandoval who advised that the therapist he was seeing left the practice and unable to take him with her. He did not like [facility]. He explained he was discharged from a rehab. He just did evaluation with [facility] yesterday and will be doing IOP. He is still residing in [place]. However, he wants place outside of [facility] to continue his medications. He was prescribed medications while in rehab and will be out within next few weeks. It did not come up in office visit this week so he will call the office requesting the refill. He is aware to contact ARTEMIO CHAHAL if there are any issues/concerns. Following conversation ARTEMIO CHAHAL contacted [facility]. They can do an intake next week for alcohol but not psychiatrist. That still needs to go through [id number] [place], [place] [address]. ARTEMIO CHAHAL located list of providers that accept his insurance. ARTEMIO CHAHAL did email to email he confirmed on chart including [address]. ARTEMIO CHAHAL will continue to remain available for psychosocial support as needed.

## 2023-09-22 NOTE — TELEPHONE ENCOUNTER
Patient called in states that he was seen yesterday and Nannette Harada was suppose to prescribe a medication for thrush. Please advise thank you.
Thank you.
Ambulatory

## 2023-09-29 ENCOUNTER — PATIENT OUTREACH (OUTPATIENT)
Dept: FAMILY MEDICINE CLINIC | Facility: CLINIC | Age: 43
End: 2023-09-29

## 2023-09-29 NOTE — PROGRESS NOTES
ARTEMIO CHAHAL placed follow up call to the patient, Loren Collado who advised has not contacted PCP yet. He is closer to running out of the medication and plans to call her soon. He is aware to contact ARTEMIO CHAHAL with any issues. ARTEMIO CHAHAL will continue to remain available for psychosocial support as needed.

## 2023-10-06 ENCOUNTER — PATIENT OUTREACH (OUTPATIENT)
Dept: FAMILY MEDICINE CLINIC | Facility: CLINIC | Age: 43
End: 2023-10-06

## 2023-10-06 NOTE — PROGRESS NOTES
ARTEMIO CHAHAL placed follow up call to the patient, Yenny Benítez and left message. ARTEMIO CHAHAL will continue to remain available for psychosocial support as needed.

## 2023-10-13 ENCOUNTER — TELEPHONE (OUTPATIENT)
Dept: PSYCHIATRY | Facility: CLINIC | Age: 43
End: 2023-10-13

## 2023-10-13 NOTE — TELEPHONE ENCOUNTER
Contacted patient in regards to [employer] in attempts to schedule appointment at [facility] office.  LVM for patient to contactthe intake dept

## 2023-10-20 ENCOUNTER — PATIENT OUTREACH (OUTPATIENT)
Dept: FAMILY MEDICINE CLINIC | Facility: CLINIC | Age: 43
End: 2023-10-20

## 2023-10-20 NOTE — LETTER
Margarita  [phone]    Re: Care Coordination    10/20/2023       Dear Whit Valera,    I would like to talk with you about your care needs. Please contact me at Dept: [phone]. If you have other questions, please do not hesitate to contact me about those as well. If I do not have an answer I will assist you in finding the appropriate agency or individual who can help.     Sincerely,         Suzette Fong

## 2023-10-20 NOTE — PROGRESS NOTES
ARTEMIO CHAHAL placed additional follow up call to the patient, Chayo López and left message. ARTEMIO CHAHAL sent Unable to Reach letter via [address] [place] [address]. ARTEMIO CHAHAL will close referral due to lost communication with patient. ARTEMIO CHAHAL had provided list of mental health providers that accept his insurance. ARTEMIO CHAHAL will remain available for psychosocial support as needed.

## 2023-10-24 NOTE — TELEPHONE ENCOUNTER
Contacted patient in regards to [employer] in attempts to schedule appointment at [facility] office.  LVM for patient to contactthe intake dept

## 2023-11-14 ENCOUNTER — HOSPITAL ENCOUNTER (EMERGENCY)
Facility: HOSPITAL | Age: 43
Discharge: HOME | End: 2023-11-15
Attending: EMERGENCY MEDICINE
Payer: COMMERCIAL

## 2023-11-14 DIAGNOSIS — F32.A DEPRESSION: Primary | ICD-10-CM

## 2023-11-14 DIAGNOSIS — F10.10 ALCOHOL ABUSE: ICD-10-CM

## 2023-11-14 DIAGNOSIS — R45.851 SUICIDAL IDEATIONS: ICD-10-CM

## 2023-11-14 DIAGNOSIS — Z00.8 ENCOUNTER FOR PSYCHOLOGICAL EVALUATION: ICD-10-CM

## 2023-11-14 LAB
AMPHETAMINES SERPL QL SCN: NEGATIVE
BARBITURATES UR QL: NEGATIVE
BENZODIAZ UR QL: NEGATIVE
COCAINE UR QL: NEGATIVE
ETHANOL EXG-MCNC: 0.14 MG/DL
ETHANOL EXG-MCNC: 0.16 MG/DL
METHADONE UR QL: NEGATIVE
OPIATES UR QL SCN: NEGATIVE
OXYCODONE+OXYMORPHONE UR QL SCN: POSITIVE
PCP UR QL: NEGATIVE
THC UR QL: NEGATIVE

## 2023-11-14 PROCEDURE — 82075 ASSAY OF BREATH ETHANOL: CPT | Performed by: EMERGENCY MEDICINE

## 2023-11-14 PROCEDURE — 99285 EMERGENCY DEPT VISIT HI MDM: CPT

## 2023-11-14 PROCEDURE — 80307 DRUG TEST PRSMV CHEM ANLYZR: CPT | Performed by: EMERGENCY MEDICINE

## 2023-11-14 PROCEDURE — 99285 EMERGENCY DEPT VISIT HI MDM: CPT | Performed by: EMERGENCY MEDICINE

## 2023-11-14 RX ORDER — AMLODIPINE BESYLATE 5 MG/1
10 TABLET ORAL DAILY
Status: DISCONTINUED | OUTPATIENT
Start: 2023-11-15 | End: 2023-11-15 | Stop reason: HOSPADM

## 2023-11-14 NOTE — ED PROVIDER NOTES
History  Chief Complaint   Patient presents with    Psychiatric Evaluation     Pt brought in by [place] [employer] officer. Pt was arrested for DUI and while at skilled nursing pt made statements about killing himself. Pt admits to (+)SI with plan and means but will not state what plan is "if I tell you what it is you'll take them away from me"     77-year-old male presents in police custody for evaluation of suicidal ideation. Patient was arrested today for DUI but will not state how much he had to drink, stating it was "several little shots". He states he is suicidal, has a means and knows it will be effective but will not state what it is because he thinks "it will be taken away from [him]". He reports that he has been having SI in recent days, not just because he was arrested for DUI today. He denies prior history of suicidal ideation or suicide attempt. He denies HI and command hallucinations. He does appear mildly intoxicated and has prior, recent visits to the emergency department for alcohol abuse. He refused to breathalyze for police and they are seeking a warrant for serum ethanol test.  He states that he has a history of hypertension for which he is supposed to take meds but is "weaning [himself] off". Denies f/c, HA, CP, SOB, abdominal pain, n/v/d. 12 system ROS o/w negative. History provided by:  Patient and medical records  Psychiatric Evaluation  Presenting symptoms: depression, suicidal thoughts and suicidal threats    Presenting symptoms: no aggressive behavior, no agitation, no bizarre behavior, no delusions, no disorganized speech, no disorganized thought process, no hallucinations, no homicidal ideas, no paranoid behavior, no self-mutilation and no suicide attempt    Patient accompanied by:  Law enforcement  Degree of incapacity (severity):   Moderate  Onset quality:  Unable to specify  Progression:  Worsening  Chronicity:  New  Context: alcohol use and stressful life event    Context: not drug abuse and not medication    Relieved by:  None tried  Worsened by:  Alcohol  Ineffective treatments:  None tried  Associated symptoms: feelings of worthlessness and poor judgment    Associated symptoms: no abdominal pain, no anhedonia, no anxiety, no chest pain, no euphoric mood and no headaches    Risk factors: no hx of mental illness, no hx of suicide attempts and no recent psychiatric admission        Prior to Admission Medications   Prescriptions Last Dose Informant Patient Reported? Taking?    Ascorbic Acid (Vitamin C ER) 500 MG CPCR   Yes No   FLUoxetine (PROzac) 40 MG capsule   Yes No   Nutritional Supplements (Ensure Nutrition Shake) LIQD   Yes No   Omeprazole 20 MG TBEC   Yes No   QUEtiapine Fumarate 150 MG TABS   Yes No   Thiamine Mononitrate (VITAMIN B1) 100 mg tablet   No No   Sig: Take 1 tablet (100 mg total) by mouth daily   albuterol (Ventolin HFA) 90 mcg/act inhaler   No No   Sig: Inhale 2 puffs every 6 (six) hours as needed for wheezing   amLODIPine (NORVASC) 10 mg tablet   No No   Sig: Take 1 tablet (10 mg total) by mouth daily   cloNIDine (CATAPRES) 0.1 mg tablet   Yes No   cyanocobalamin 1,000 mcg/mL   Yes No   folic acid (FOLVITE) 1 mg tablet   Yes No   hydrOXYzine HCL (ATARAX) 50 mg tablet   No No   Sig: Take 1 tablet (50 mg total) by mouth every 6 (six) hours as needed for anxiety   hydrOXYzine pamoate (VISTARIL) 50 mg capsule   Yes No   losartan (COZAAR) 25 mg tablet   No No   Sig: Take 1 tablet (25 mg total) by mouth daily   methocarbamol (ROBAXIN) 500 mg tablet   Yes No   traZODone (DESYREL) 100 mg tablet   Yes No      Facility-Administered Medications: None       Past Medical History:   Diagnosis Date    GERD (gastroesophageal reflux disease)     Hypertension     Rectal bleed        Past Surgical History:   Procedure Laterality Date    EYE SURGERY      SHOULDER SURGERY Right 12/08/2022       Family History   Problem Relation Age of Onset    Hypertension Father     Colon cancer Paternal Aunt I have reviewed and agree with the history as documented. E-Cigarette/Vaping    E-Cigarette Use Never User      E-Cigarette/Vaping Substances    Nicotine Yes     THC No     CBD No     Flavoring No     Other No     Unknown No      Social History     Tobacco Use    Smoking status: Every Day     Packs/day: 1.00     Types: Cigarettes    Smokeless tobacco: Never   Vaping Use    Vaping Use: Never used   Substance Use Topics    Alcohol use: Yes     Comment: 15 shots of vodka/day    Drug use: Never       Review of Systems   Constitutional:  Negative for chills, diaphoresis and fever. HENT:  Negative for rhinorrhea, sore throat and trouble swallowing. Respiratory:  Negative for cough, chest tightness, shortness of breath and wheezing. Cardiovascular:  Negative for chest pain, palpitations and leg swelling. Gastrointestinal:  Negative for abdominal distention, abdominal pain, constipation, diarrhea, nausea and vomiting. Genitourinary:  Negative for difficulty urinating, dysuria, flank pain, frequency, hematuria and urgency. Musculoskeletal:  Negative for arthralgias, back pain, myalgias, neck pain and neck stiffness. Skin:  Negative for pallor and rash. Neurological:  Negative for dizziness, weakness, light-headedness and headaches. Hematological:  Negative for adenopathy. Psychiatric/Behavioral:  Positive for dysphoric mood and suicidal ideas. Negative for agitation, confusion, hallucinations, homicidal ideas, paranoia and self-injury. The patient is not nervous/anxious. All other systems reviewed and are negative. Physical Exam  Physical Exam  Vitals reviewed. Constitutional:       General: He is not in acute distress. Appearance: Normal appearance. He is well-developed. He is not ill-appearing, toxic-appearing or diaphoretic. HENT:      Head: Normocephalic and atraumatic.       Right Ear: External ear normal.      Left Ear: External ear normal.      Nose: Nose normal. Mouth/Throat:      Mouth: Mucous membranes are moist.      Pharynx: Oropharynx is clear. No oropharyngeal exudate. Eyes:      General: No scleral icterus. Conjunctiva/sclera: Conjunctivae normal.      Pupils: Pupils are equal, round, and reactive to light. Cardiovascular:      Rate and Rhythm: Normal rate and regular rhythm. Heart sounds: No murmur heard. Pulmonary:      Effort: Pulmonary effort is normal.      Breath sounds: Normal breath sounds. Abdominal:      General: Bowel sounds are normal. There is no distension. Palpations: Abdomen is soft. Tenderness: There is no abdominal tenderness. Musculoskeletal:         General: No tenderness. Normal range of motion. Cervical back: Normal range of motion and neck supple. Lymphadenopathy:      Cervical: No cervical adenopathy. Skin:     General: Skin is warm and dry. Coloration: Skin is not pale. Findings: No bruising, erythema or rash. Neurological:      General: No focal deficit present. Mental Status: He is alert and oriented to person, place, and time. Motor: No weakness or abnormal muscle tone. Deep Tendon Reflexes: Reflexes are normal and symmetric. Psychiatric:         Behavior: Behavior normal.         Thought Content:  Thought content normal.         Vital Signs  ED Triage Vitals [11/14/23 1748]   Temperature Pulse Respirations Blood Pressure SpO2   98 °F (36.7 °C) 95 20 147/94 98 %      Temp src Heart Rate Source Patient Position - Orthostatic VS BP Location FiO2 (%)   -- Monitor Lying Left arm --      Pain Score       --           Vitals:    11/14/23 1748   BP: 147/94   Pulse: 95   Patient Position - Orthostatic VS: Lying         Visual Acuity      ED Medications  Medications - No data to display    Diagnostic Studies  Results Reviewed       Procedure Component Value Units Date/Time    Rapid drug screen, urine [[id number]]  (Abnormal) Collected: 11/14/23 1816    Lab Status: Final result Specimen: Urine, Clean Catch Updated: 11/14/23 1847     Amph/Meth UR Negative     Barbiturate Ur Negative     Benzodiazepine Urine Negative     Cocaine Urine Negative     Methadone Urine Negative     Opiate Urine Negative     PCP Ur Negative     THC Urine Negative     Oxycodone Urine Positive    Narrative:      Presumptive report. If requested, specimen will be sent to reference lab for confirmation. FOR MEDICAL PURPOSES ONLY. IF CONFIRMATION NEEDED PLEASE CONTACT THE LAB WITHIN 5 DAYS. Drug Screen Cutoff Levels:  AMPHETAMINE/METHAMPHETAMINES  1000 ng/mL  BARBITURATES     200 ng/mL  BENZODIAZEPINES     200 ng/mL  COCAINE      300 ng/mL  METHADONE      300 ng/mL  OPIATES      300 ng/mL  PHENCYCLIDINE     25 ng/mL  THC       50 ng/mL  OXYCODONE      100 ng/mL    POCT alcohol breath test [[id number]]  (Normal) Resulted: 11/14/23 1758    Lab Status: Final result Updated: 11/14/23 1758     EXTBreath Alcohol 0.158                   No orders to display              Procedures  Procedures         ED Course                                             Medical Decision Making  MDM/DDx: Alcohol abuse, depression, SI w/credible plan. I independently reviewed and interpreted ordered labs from this encounter. A/P: Will check BAT, UDS, consult crisis for 201 admission. Amount and/or Complexity of Data Reviewed  Labs: ordered. Decision-making details documented in ED Course. Risk  Decision regarding hospitalization. Disposition  Final diagnoses:   Depression   Suicidal ideations   Encounter for psychological evaluation   Alcohol abuse     Time reflects when diagnosis was documented in both MDM as applicable and the Disposition within this note       Time User Action Codes Description Comment    11/14/2023  6:07 PM Leann, [address] [F32. A] Depression     11/14/2023  6:07 PM Leann, [address] [facility] Suicidal ideations     11/14/2023  6:07 PM Nando Mojica Add [Z00.8] Encounter for psychological evaluation     11/14/2023  6:07 PM Jared Palma Add [F10.10] Alcohol abuse           ED Disposition       ED Disposition   Transfer to [address] [place] [address]   --    Date/Time   Tue Nov 14, 2023  6:07 PM    Comment   [facility] should be transferred out to [facility] and has been medically cleared. Follow-up Information    None         Patient's Medications   Discharge Prescriptions    No medications on file       No discharge procedures on file.     PDMP Review         Value Time User    PDMP Reviewed  Yes 7/22/2023  8:58 AM Mitul Griffin PA-C            ED Provider  Electronically Signed by             Jared Palma DO  11/14/23 5780

## 2023-11-14 NOTE — Clinical Note
Keily Uriarte should be transferred out to [facility] and has been medically cleared.

## 2023-11-15 VITALS
DIASTOLIC BLOOD PRESSURE: 85 MMHG | SYSTOLIC BLOOD PRESSURE: 143 MMHG | HEART RATE: 80 BPM | OXYGEN SATURATION: 100 % | RESPIRATION RATE: 16 BRPM | TEMPERATURE: 98.9 F

## 2023-11-15 LAB — ETHANOL EXG-MCNC: 0.09 MG/DL

## 2023-11-15 PROCEDURE — 82075 ASSAY OF BREATH ETHANOL: CPT | Performed by: EMERGENCY MEDICINE

## 2023-11-15 PROCEDURE — 99244 OFF/OP CNSLTJ NEW/EST MOD 40: CPT | Performed by: PSYCHIATRY & NEUROLOGY

## 2023-11-15 RX ORDER — QUETIAPINE FUMARATE 150 MG/1
150 TABLET, FILM COATED ORAL
Qty: 30 TABLET | Refills: 0 | Status: SHIPPED | OUTPATIENT
Start: 2023-11-15 | End: 2023-12-15

## 2023-11-15 RX ADMIN — AMLODIPINE BESYLATE 10 MG: 5 TABLET ORAL at 08:39

## 2023-11-15 NOTE — ED NOTES
Pt is reluctant to answer questions. Admits to thoughts of self harm. Pt has 1:1 attendant for safety.      Donald Preston RN  11/14/23 1063

## 2023-11-15 NOTE — ED CARE HANDOFF
Emergency Department Sign Out Note        Sign out and transfer of care from [facility]. See Separate Emergency Department note. The patient, Sky Jones, was evaluated by the previous provider for see prior note. ED Course / Workup Pending (followup):                                    ED Course as of 11/14/23 2252 Tue Nov 14, 2023   1259 S/O: Verba Drones, hx of depression, not on home psych meds. Suicidal, awaiting sobriety. Psych eval if he tries to leave. Procedures  Medical Decision Making  Amount and/or Complexity of Data Reviewed  Labs: ordered. Risk  Prescription drug management. Decision regarding hospitalization. Disposition  Final diagnoses:   Depression   Suicidal ideations   Encounter for psychological evaluation   Alcohol abuse     Time reflects when diagnosis was documented in both MDM as applicable and the Disposition within this note       Time User Action Codes Description Comment    11/14/2023  6:07 PM Leann, [address] [F32. A] Depression     11/14/2023  6:07 PM Margery Bloch Add [I99.560] Suicidal ideations     11/14/2023  6:07 PM Margery Bloch Add [Z00.8] Encounter for psychological evaluation     11/14/2023  6:07 PM Margery Bloch Add [F10.10] Alcohol abuse           ED Disposition       ED Disposition   Transfer to [address] [place] [address]   --    Date/Time   Tue Nov 14, 2023  6:07 PM    Comment   Sky Jones should be transferred out to [facility] and has been medically cleared. Follow-up Information    None       Patient's Medications   Discharge Prescriptions    No medications on file     No discharge procedures on file.        ED Provider  Electronically Signed by     Juanito Devries MD  11/14/23 3188

## 2023-11-15 NOTE — ED CARE HANDOFF
Emergency Department Sign Out Note        Sign out and transfer of care from Dr. Luan Bliss. See Separate Emergency Department note. The patient, Kaushal Dill, was evaluated by the previous provider for SI. Workup Completed:  Medically cleared for psych eval    ED Course / Workup Pending (followup):  Evaluated by Dr. Heron Avilez with psychiatry who notes patient has no indication for admission. Seroquel refill prescribed. PCP follow-up. Procedures  Medical Decision Making  Amount and/or Complexity of Data Reviewed  Labs: ordered. Risk  Prescription drug management. Disposition  Final diagnoses:   Depression   Suicidal ideations   Encounter for psychological evaluation   Alcohol abuse     Time reflects when diagnosis was documented in both MDM as applicable and the Disposition within this note       Time User Action Codes Description Comment    11/14/2023  6:07 PM Oliver Noriega [address] [F32. A] Depression     11/14/2023  6:07 PM Jared Clearwater Add [H05.477] Suicidal ideations     11/14/2023  6:07 PM Jared Clearwater Add [Z00.8] Encounter for psychological evaluation     11/14/2023  6:07 PM Jared Clearwater Add [F10.10] Alcohol abuse           ED Disposition       ED Disposition   Discharge    Condition   Stable    Date/Time   Wed Nov 15, 2023 10:04 AM    Comment   Kaushal Dill discharge to home/self care.                    Follow-up Information       Follow up With Specialties Details Why Contact Info    Nahum Chatman MD Internal Medicine   [address] [place] [address] John Jaramillo [address]  [facility]  [phone]            Current Discharge Medication List        CONTINUE these medications which have CHANGED    Details   QUEtiapine Fumarate 150 MG TABS Take 150 mg by mouth daily at bedtime  Qty: 30 tablet, Refills: 0    Associated Diagnoses: Depression           CONTINUE these medications which have NOT CHANGED    Details   albuterol (Ventolin HFA) 90 mcg/act inhaler Inhale 2 puffs every 6 (six) hours as needed for wheezing  Qty: 18 g, Refills: 0    Comments: Substitution to a formulary equivalent within the same pharmaceutical class is authorized. Associated Diagnoses: Chronic cough      amLODIPine (NORVASC) 10 mg tablet Take 1 tablet (10 mg total) by mouth daily  Qty: 30 tablet, Refills: 1    Comments: Please let the patient know that he needs to see Dr. Fadia Craven for any further refills. Thanks  Associated Diagnoses: Uncontrolled hypertension      Ascorbic Acid (Vitamin C ER) 500 MG CPCR       cloNIDine (CATAPRES) 0.1 mg tablet       cyanocobalamin 1,000 mcg/mL       FLUoxetine (PROzac) 40 MG capsule       folic acid (FOLVITE) 1 mg tablet       hydrOXYzine HCL (ATARAX) 50 mg tablet Take 1 tablet (50 mg total) by mouth every 6 (six) hours as needed for anxiety  Qty: 30 tablet, Refills: 0    Associated Diagnoses: Alcohol use disorder, severe, dependence (HCC)      hydrOXYzine pamoate (VISTARIL) 50 mg capsule       losartan (COZAAR) 25 mg tablet Take 1 tablet (25 mg total) by mouth daily  Qty: 30 tablet, Refills: 3    Associated Diagnoses: Hypertension, unspecified type      methocarbamol (ROBAXIN) 500 mg tablet       Nutritional Supplements (Ensure Nutrition Shake) LIQD       Omeprazole 20 MG TBEC       Thiamine Mononitrate (VITAMIN B1) 100 mg tablet Take 1 tablet (100 mg total) by mouth daily  Qty: 90 tablet, Refills: 1    Associated Diagnoses: Chronic alcohol use      traZODone (DESYREL) 100 mg tablet            No discharge procedures on file.        ED Provider  Electronically Signed by     Galen Narayanan MD  11/15/23 2517

## 2023-11-15 NOTE — CONSULTS
Psychiatric Evaluation - Behavioral Health   Alpesh Gamboa 37 y.o. male MRN: [id number]  Unit/Bed#: ED 09 Encounter: [id number]    Assessment and Plan     Assessment       Assessment:    Eron Mckenna "Chayo Alt" Tiffany Sheets is a 36 y/o overtly-appearing   male with history of chronic alcohol use disorder, resulting in multiple hospitalizations who was brought in by [facility] police following his arrest for DUI after patient endorsed suicidal ideations with intent and a specific plan to harm himself which he would not disclose for fear of it being "taken away." He reports drinking 4-5 shots before leaving in his car to  his daughter from high school when he was arrested en route and refused a breathalyzer test resulting in his arrest. At the present time, he denies having active suicidal ideations and recalls making these statements but denies having any means or intent to harm himself. He specifically states that he was not truthful about having a means to harm himself and states that he was being "manipulative" when he made these statements. He denies access to firearms. Collateral was obtained from his wife who confirmed that there he does not have access to firearms and he has not expressed suicidal ideations or intent prior to his arrest. At this time, he does not meet criteria for inpatient admission on a psychiatric unit. He will follow-up with his current outpatient treatment with [facility] and reach out to psychiatry as needed. Principal Psychiatric Problem:  Unspecified mood disorder (HCC)  Differential: Substance-induced mood disorder, adjustment disorder    Active Problems: There are no active Hospital Problems. Plan     Plan    Admission labs reviewed. Collaborate with collaterals for baseline assessment and disposition as indicated. Recommend no changes in psychiatric medication at this time.   Continue quetiapine 150mg qHS  Continue fluoxetine 40mg qd  Continue Intensive Outpatient Program through [facility]. Psychiatry will sign off at this time. Please reach out to our service via TigerText for re-evaluation as needed. If contacting after hours, please call or TigerText the on-call team (MARIO: [phone]) with any questions or concerns. Risks, benefits and possible side effects of Medications:   Risks, benefits, and possible side effects of medications explained to patient and patient verbalizes understanding. HPI   History of Present Illness     Physician Requesting Consult: Yousif Christine MD  Reason for Consult / Principal Problem: Depression and suicidal ideations in the setting of ethanol intoxication    Chief Complaint: Suicidal ideation in the setting of ethanol intoxication    Kanwal Sebastian is a 37 y.o. overtly-appearing   male, possessing pertinent psychiatric history of chronic ethanol abuse resulting in previous hospitalizations, who presented for suicidal ideations in the setting of ethanol intoxication. The patient was brought in by [place] police after being arrested for DUI because the patient stated that he is suicidal with a means and intent to end his own life but would not disclose the means for fear of it being "taken away" from him. He reports having drank 4-5 shots around 2pm yesterday before getting into the car to  his daughter from high school. He was pulled over by the police en route and refused a breathalyzer test. After being arrested and taken to senior living, the patient stated that he was suicidal and was brought to the [facility] ED. On presentation to the ED his alcohol breath test was 0.158 around 6pm. He had a rapid drug screen which was positive for oxycodone. The patient states that he is being prescribed oxycodone by his orthopedic doctor for his recent shoulder surgery and he has been taking it 2x/day starting on Monday at a dose of 10-325mg.  Prior to this episode, the patient reports that he was sober for 2 months after his last stay in rehab for alcohol use but relapsed several days ago due to stress, anxiety, and pain from his shoulder surgery at which point he began drinking 4-5 shots/day. Notably, patient was previously attending an IOP with [facility] but due to his recent surgery, he has been unable to attend. The patient was last seen in the ED in August when he reported drinking 15 shots of vodka/day for 1 week after being sober for 1 week following discharge from inpatient detox and subsequent rehabilitation. He was admitted to [address] [place] [address] from 7/19/23 - 7/22/23 for alcohol detox during which Psychiatry was consulted and he was started on 20mg of fluoxetine for his history of "trauma." His dose of fluoxetine was increased to 40mg while in rehab in the [facility] but he states that the medication has not had any effect on his overall mood or anxiety symptoms. He is also receiving a prescription for quetiapine 150mg from his rehab which he is using nightly for sleep. He has hydroxyzine 50mg and trazodone 100mg also through rehab as needed but he reports that he has not taken these medications. He is currently attending Intensive Outpatient Program through [facility] and reports that these sessions are helpful for him. He was previously seeing a therapist but is no longer seeing them because they moved out of network and he struggles to find one that is accepting new patients under his current insurance. He denies any past psychiatric hospitalizations or diagnosed psychiatric conditions but states that he was told that he "might have anxiety" at one point. His other medical conditions include hypertension and asthma. The patient reports that he began drinking at age 15 but denies history of blackouts, delirium tremens, or withdrawal seizures. He reports emotional trauma surrounding the deaths of family members as a child.  He experienced parental death as a child and subsequently moved to live with his grandparent who passed away when he was 15years old, after which he was living with his aunt and uncle. His uncle has since passed away but he reports having a good relationship with his aunt. He started smoking cigarettes at age 13 and smokes 1 pack per day. He denies e-cigarette use or other drug use including cocaine, PCP, methamphetamine, crack, or marijuana. He is currently taking oxycodone 10-325mg BID for a shoulder injury and states that he has been on it before with no issues coming off of the medication. He is not presently concerned with his ability to come off of the medication. The patient is currently living with his wife and 5 children who range in ages from approximately 13 months - 23years old. He reports a good relationship with family members only when he is not drinking but acknowledges his relationship with family suffers when he is drinking, which causes him significant stress. He works for [employer] and completed an Associate's Degree. He denies any other legal issues aside from his current DUI and a previous DUI from several years ago. He denies any family history of psychiatric illness or substance use disorders. Per chart review, the patient previously had several locked handguns in his possession but he currently denies access to firearms. He reports significant financial stress and anxiety related to financial issues providing for 5 children. Today, the patient states his mood to be "as good as it can be." He denies feelings of hopeless, helplessness, or worthlessness but feels guilty and "disappointed" in himself for what happened. He states that his sleep was poor before starting the quetiapine 150mg qHS and he previously had trouble initiating and maintaining sleep but this is no longer an issue due to the medication. He sleeps around 7 hours/night. He denies having little interest in doing things and states that he does not have time for hobbies.  He denies changes to his appetite or weight and states that when he is "not drinking," his concentration and memory are at baseline. He denies psychomotor agitation or retardation or current suicidal or homicidal ideation. He states that he recalls stating that he was suicidal with a specific plan to harm himself but no longer has these feelings. In regards to the plan, he states that he was being "manipulative" and now specifically denies having ever had a plan or means to harm himself. He denies access to firearms. He reports feeling "overwhelmed" approximately 1x/wk for about 5-10 minutes at a time during which he feels as though he is hyperventilating and has many worries at once but denies paranoia, apprehension, or autonomic symptoms. He denies symptoms of PTSD including hypervigilance, nightmares, or flashbacks. He denies past or present symptoms of martha, OCD, auditory or visual hallucinations, paranoia, ideas of reference, delusions, or eating disorder symptoms including binging, purging, or food restriction. Per collateral from patient's wife, Tres Pacheco, stated that she does not believe the patient is suicidal and is not currently at risk for harming himself or others. She believed that he was expressing suicidal thoughts "in the moment" because he "felt bad about what happened." She also confirmed that all firearms that were previously in the house have been "removed" from the house. Medical Review Of Systems:  Pertinent items are noted in HPI. Psychiatric ROS and PMHx     Psychiatric Review Of Systems:  Sleep: Denies  Loss of Interest/Anhedonia: no  Guilt/hopeless: Yes, guilt and "disappointment"  Low energy/anergy: no  Poor Concentration: no  Appetite changes: Denies  Weight changes: Denies  Somatic symptoms: no  Anxiety/panic: Yes, occasionally feels "overwhelmed" and "breathing faster" around 1x/wk for 5-10 mins at a time.   Martha: no  Self injurious behavior/risky behavior: no  Trauma: yes - experienced death of parents and other guardians/caretakers in childhood and adolescence   If yes: none    Historical Information     Past Psychiatric History:   Past Inpatient Psychiatric management:   None. Was seen by Psychiatry while on detox in July 2023, started on fluoxetine 20mg qd. Past Outpatient Psychiatric management:   Attends Intensive Outpatient Program through [facility]. Past Medication trials:   Fluoxetine 20mg was increased to 40mg, patient reports no relief of anxiety symptoms. Quetiapine 150mg qHS for sleep  Hydroxyzine 50mg and Trazodone 100mg PRN, reports not taking  Past Suicide attempts:   Denies  History of non-suicidal self injury:   Denies  Past Violent behavior:   Denies    Substance Abuse History:    Social History       Tobacco History       Smoking Status  Every Day Smoking Frequency  1.00 packs/day Smoking Tobacco Type  Cigarettes      Smokeless Tobacco Use  Never              Alcohol History       Alcohol Use Status  Yes Comment  15 shots of vodka/day              Drug Use       Drug Use Status  Never              Sexual Activity       Sexually Active  Yes Partners  Female              Activities of Daily Living    Not Asked                 Additional Substance Use Detail       Questions Responses    Problems Due to Past Use of Alcohol? Yes    Problems Due to Past Use of Substances?  No    Substance Use Assessment Denies substance use within the past 12 months    Alcohol Use Frequency Daily    Alcohol Drink of Choice beer/alcohol    1st Use of Alcohol 15    Last Use of Alcohol & Amount 7/19/2023  14 shots vodka    Longest Abstinence from Alcohol 2-3 days    Last reviewed by Claudia Lima DO on 11/14/2023          I have assessed this patient for substance use within the past 12 months     Family Psychiatric History:   Denies    Social History:  Education: college graduate - Associate's Degree  Learning Disabilities:  None  Marital history:   Living arrangement, social support: The patient lives in home with wife and children: how many 5, ages 17 mo - 23 yrs., Acknowledges financial stressors. Access to firearms: Denies  Occupational History: Works for [employer] Relationships: good relationship with spouse or significant other when he is not drinking. Strained relationship when he is drinking.   Other Pertinent History: Financial, Legal: current DUI, previous DUI several years ago, and Trauma - deaths of parents/guardians in childhood and adolescence as stated above      Traumatic History:   Abuse:  Denies  Other Traumatic Events: As stated above    Past Medical History:   Diagnosis Date    GERD (gastroesophageal reflux disease)     Hypertension     Rectal bleed        Meds/Allergies   all current active meds have been reviewed  No Known Allergies    Objective   Vital signs in last 24 hours:  Temp:  [98 °F (36.7 °C)-98.9 °F (37.2 °C)] 98.9 °F (37.2 °C)  HR:  [80-96] 80  Resp:  [14-20] 16  BP: (140-162)/() 143/85    No intake or output data in the 24 hours ending 11/15/23 1012    Mental Status Evaluation and Medical ROS     Mental Status Evaluation:  Appearance:  alert, intermittent eye contact, appears stated age, casually dressed in green paper scrubs, tattooed, appropriate grooming and hygiene, bald, and bearded   Behavior:  calm, cooperative, but guarded, notably wincing in pain from shoulder, frequent yawning, rhinorrhea present   Motor: no abnormal movements, restless and fidgety, and gait not assessed   Speech:  spontaneous, clear, normal rate, normal volume, coherent, and sparse at times   Mood:  "As good as it can be"   Affect:  mood-congruent, constricted, and appears anxious   Thought Process:  Organized, logical, goal-directed   Thought Content: no verbalized delusions or overt paranoia   Perceptual disturbances: no reported hallucinations, does not appear to be responding to internal stimuli at this time, appeared distracted at times   Risk Potential: No active or passive suicidal or homicidal ideation was verbalized during interview, Low potential for aggression based on previous behavior   Cognition: oriented to self and situation, appears to be of average intelligence, normal abstract reasoning, and cognition not formally tested   Insight:  Fair   Judgment: Poor     Muscle Strength and Tone: Not assessed   Gait/Station: Not assessed   Motor Activity: no abnormal movements       Laboratory results:  I have personally reviewed all pertinent laboratory/tests results. Imaging Studies: None  EKG: IMb=[id number] on 8/11/23    Risk of Harm to Self:   The following ratings are based on assessment at the time of the interview  Demographic risk factors include: , male  Historical Risk Factors include: history of anxiety, alcohol use, death of guardians as a child  Current Specific Risk Factors include: recent suicidal ideation, alcohol use, current DUI  Protective Factors: ability to make plans for the future, no current suicidal plan or intent, family support established, being a parent, being , sense of determination, adherence to outpatient program and medications  Weapons/Firearms: none and no firearms. The following steps have been taken to ensure weapons are properly secured: not applicable  Based on today's assessment, Elzie Bosworth presents the following risk of harm to self: minimal    Risk of Harm to Others: The following ratings are based on assessment at the time of the interview  Demographic Risk Factors include: male.   Historical Risk Factors include: alcohol abuse, prior arrest for DUI  Current Specific Risk Factors include: recent substance use, multiple stressors  Protective Factors: no current homicidal ideation, improved mood, compliant with medications, compliant with treatment, willing to continue psychiatric treatment, outpatient follow up established, no prior history of violence, supportive family, responsibilities and duties to others, being a parent, being , medical compliance  Weapons/Firearms: none and no firearms. The following steps have been taken to ensure weapons are properly secured: not applicable  Based on today's assessment, Lesia Dupree presents the following risk of harm to others: minimal    This note has been constructed in part using a voice recognition system. There may be translation, syntax,  or grammatical errors. If you have any questions, please contact the dictating provider. Ashli Thompson, MS3  Mariaa Newton.  Hunter Faulkner Anya  Psychiatry Resident, PGY-I

## 2023-11-15 NOTE — ED NOTES
[address], ( [phone]) Officer Ismael Tirado said that he can be released when we are done with the assessment.   They have what they need

## 2023-11-15 NOTE — ED NOTES
Pt's wife called to check on the status of pt. Pt informed this RN that its ok to update wife.  Wife updated - informed we are waiting for psychiatry to see pt and then once they make a decision on whether he needs to be admitted or can go home I will call and update wife     Lucio Thomas RN  11/15/23 4780

## 2023-11-15 NOTE — ED NOTES
Pt denies feeling suicidal, denies hearing or seeing things. Pt updated on status- informed we are waiting for psychiatry to see him. 1:1 inside room with pt.  Pt was out of room to bathroom - states he needed to have a BM and didn't want to use the room he was in to have a [address] RN  11/15/23 1929 Consent: The patient's consent was obtained including but not limited to risks of crusting, scabbing, blistering, scarring, darker or lighter pigmentary change, recurrence, incomplete removal and infection. Post-Care Instructions: I reviewed with the patient in detail post-care instructions. Patient is to wear sunprotection, and avoid picking at any of the treated lesions. Pt may apply Vaseline to crusted or scabbing areas. Detail Level: Detailed Render Post-Care Instructions In Note?: yes Render Note In Bullet Format When Appropriate: No Duration Of Freeze Thaw-Cycle (Seconds): 0 Number Of Freeze-Thaw Cycles: 1 freeze-thaw cycle

## 2023-11-15 NOTE — ED NOTES
Spoke with pts wife.  States she is going to come pick him up     Alessiagerry Mohan RN  11/15/23 8468

## 2023-11-15 NOTE — ED NOTES
Pt is a 37 y.o. male who was brought to the ED with   Chief Complaint   Patient presents with    Psychiatric Evaluation     Pt brought in by [facility] PD officer. Pt was arrested for DUI and while at shelter pt made statements about killing himself. Pt admits to (+)SI with plan and means but will not state what plan is "if I tell you what it is you'll take them away from me"     Intake and Safety risk Assessment not completed  Pt will be admitted to TBD    [address] introduced self and role to patient. Pt appeared to be extremely lethargic and unwilling to elaborate or current emotional state/disposition. Pt's answers to basic questions were guarded, refusal to make eye contact and general poor attitude observed. Pt denied SI and stated he didn't want to discuss anything until morning since, "no one was trying to see how he was doing or offered to come to the ED, so I might as well stay the night."      NOTE: Pt currently does not meet criteria for 302 nor does he admit to SI, HI or Hallucinations. Pt was admitted to ER with EXTBreath Alcohol 0.158 @ 1731    In addition, [facility] was informed that previous Attending, Dr. Kimmie Tran, DO, would like pt to be cleared by Psychiatry prior to discharge.

## 2023-11-15 NOTE — ED NOTES
Breathalyzer = .139  Crisis awaiting result < .080 prior to eval.  Pt apprised.        Qasim Saenz, RN  11/14/23 2010

## 2023-12-27 ENCOUNTER — OFFICE VISIT (OUTPATIENT)
Dept: FAMILY MEDICINE CLINIC | Facility: CLINIC | Age: 43
End: 2023-12-27
Payer: COMMERCIAL

## 2023-12-27 VITALS
HEART RATE: 72 BPM | HEIGHT: 67 IN | OXYGEN SATURATION: 94 % | DIASTOLIC BLOOD PRESSURE: 80 MMHG | RESPIRATION RATE: 16 BRPM | BODY MASS INDEX: 26.53 KG/M2 | WEIGHT: 169 LBS | TEMPERATURE: 98.6 F | SYSTOLIC BLOOD PRESSURE: 110 MMHG

## 2023-12-27 DIAGNOSIS — J20.5 ACUTE BRONCHITIS DUE TO RESPIRATORY SYNCYTIAL VIRUS (RSV): Primary | ICD-10-CM

## 2023-12-27 DIAGNOSIS — T14.91XA SUICIDE ATTEMPT (HCC): ICD-10-CM

## 2023-12-27 DIAGNOSIS — Z72.0 TOBACCO USE: ICD-10-CM

## 2023-12-27 PROCEDURE — 99213 OFFICE O/P EST LOW 20 MIN: CPT | Performed by: INTERNAL MEDICINE

## 2023-12-27 RX ORDER — QUETIAPINE FUMARATE 300 MG/1
150 TABLET, FILM COATED ORAL
COMMUNITY
Start: 2023-10-17 | End: 2024-01-05 | Stop reason: SDUPTHER

## 2023-12-27 RX ORDER — PREDNISONE 10 MG/1
TABLET ORAL
Qty: 30 TABLET | Refills: 0 | Status: SHIPPED | OUTPATIENT
Start: 2023-12-27

## 2023-12-27 RX ORDER — BUDESONIDE AND FORMOTEROL FUMARATE DIHYDRATE 160; 4.5 UG/1; UG/1
2 AEROSOL RESPIRATORY (INHALATION) 2 TIMES DAILY
Qty: 10.2 G | Refills: 5 | Status: SHIPPED | OUTPATIENT
Start: 2023-12-27

## 2023-12-27 RX ORDER — PSEUDOEPHED/ACETAMINOPH/DIPHEN 30MG-500MG
TABLET ORAL
COMMUNITY
Start: 2023-10-12

## 2023-12-27 RX ORDER — BENZONATATE 200 MG/1
200 CAPSULE ORAL 3 TIMES DAILY PRN
Qty: 20 CAPSULE | Refills: 0 | Status: SHIPPED | OUTPATIENT
Start: 2023-12-27

## 2023-12-27 NOTE — ASSESSMENT & PLAN NOTE
Patient has finished inpatient rehab and will be following up with [facility] and has an appointment with psychiatry January 2.

## 2023-12-27 NOTE — PROGRESS NOTES
"Assessment/Plan:           Problem List Items Addressed This Visit       Suicide attempt (HCC)     Patient has finished inpatient rehab and will be following up with [facility] and has an appointment with psychiatry January 2.          Other Visit Diagnoses       Acute bronchitis due to respiratory syncytial virus (RSV)    -  Primary    Relevant Medications    benzonatate (TESSALON) 200 MG capsule    predniSONE 10 mg tablet    budesonide-formoterol (SYMBICORT) 160-4.5 mcg/act inhaler    Tobacco use        Relevant Medications    benzonatate (TESSALON) 200 MG capsule    predniSONE 10 mg tablet    budesonide-formoterol (SYMBICORT) 160-4.5 mcg/act inhaler         Patient would update me about his progress in a couple of days.  See discussion above    Subjective:      Patient ID: David Bustamante is a 43 y.o. male.    HPI  Patient is here for an upper respiratory infection.  He was hospitalized [facility] and was diagnosed with RSV bronchitis.  Chest x-ray showed a pulm nodule which was followed up with a CT scan and no concerns were found.  Patient will be treated with Zithromax and then Augmentin without much relief.  He does have cough medication and has albuterol inhaler and has not making any progress.  He unfortunate continues to smoke half pack a day.  He reports other family members to be getting better.  He reports no fevers and chills.  I would hold off on giving another round of antibiotic but we will give her prednisone and steroid inhaler.  Cough medications will be called in.  Again patient discouraged from using tobacco.  The following portions of the patient's history were reviewed and updated as appropriate: allergies, current medications, past medical history, past social history, and problem list.    Review of Systems      Objective:      /80   Pulse 72   Temp 98.6 °F (37 °C)   Resp 16   Ht 5' 7\" (1.702 m)   Wt 76.7 kg (169 lb)   SpO2 94%   BMI 26.47 kg/m²          Physical " Exam  Constitutional:       Comments: Sounds congested   Pulmonary:      Effort: Pulmonary effort is normal. No respiratory distress.      Breath sounds: Normal breath sounds. No wheezing or rales.      Comments: Dry deep cough   decreased breath sounds

## 2024-01-05 DIAGNOSIS — F33.1 MODERATE EPISODE OF RECURRENT MAJOR DEPRESSIVE DISORDER (HCC): Primary | ICD-10-CM

## 2024-01-05 RX ORDER — QUETIAPINE FUMARATE 150 MG/1
150 TABLET, FILM COATED ORAL
Qty: 30 TABLET | Refills: 0 | Status: SHIPPED | OUTPATIENT
Start: 2024-01-05

## 2024-01-05 RX ORDER — TRAZODONE HYDROCHLORIDE 100 MG/1
100 TABLET ORAL
Qty: 30 TABLET | Refills: 0 | Status: SHIPPED | OUTPATIENT
Start: 2024-01-05

## 2024-02-09 ENCOUNTER — TELEPHONE (OUTPATIENT)
Age: 44
End: 2024-02-09

## 2024-02-09 DIAGNOSIS — B37.0 THRUSH: ICD-10-CM

## 2024-02-09 NOTE — TELEPHONE ENCOUNTER
Patient called and stated he has Thrush again from his inhaler.   He wanted to see if his PCP can call in the same prescription.      Please advise either way

## 2024-02-15 ENCOUNTER — TELEPHONE (OUTPATIENT)
Age: 44
End: 2024-02-15

## 2024-02-15 DIAGNOSIS — I10 PRIMARY HYPERTENSION: Primary | ICD-10-CM

## 2024-02-15 DIAGNOSIS — Z13.29 THYROID DISORDER SCREENING: ICD-10-CM

## 2024-02-15 DIAGNOSIS — Z13.1 SCREENING FOR DIABETES MELLITUS: ICD-10-CM

## 2024-02-15 DIAGNOSIS — Z12.5 PROSTATE CANCER SCREENING: ICD-10-CM

## 2024-02-15 DIAGNOSIS — R68.82 LIBIDO, DECREASED: ICD-10-CM

## 2024-02-15 DIAGNOSIS — Z13.220 LIPID SCREENING: ICD-10-CM

## 2024-02-15 DIAGNOSIS — E55.9 VITAMIN D DEFICIENCY: ICD-10-CM

## 2024-02-15 NOTE — TELEPHONE ENCOUNTER
Patient would like a Testosterone test added to lab work, would like a message in [employer]hart when placed so he  can get his full workup and the test. done.

## 2024-02-16 ENCOUNTER — APPOINTMENT (OUTPATIENT)
Dept: LAB | Facility: MEDICAL CENTER | Age: 44
End: 2024-02-16
Payer: COMMERCIAL

## 2024-02-16 DIAGNOSIS — Z12.5 PROSTATE CANCER SCREENING: ICD-10-CM

## 2024-02-16 DIAGNOSIS — F10.90 CHRONIC ALCOHOL USE: ICD-10-CM

## 2024-02-16 LAB
AMYLASE SERPL-CCNC: 60 IU/L (ref 29–103)
LIPASE SERPL-CCNC: 27 U/L (ref 11–82)

## 2024-02-16 PROCEDURE — 83690 ASSAY OF LIPASE: CPT

## 2024-02-16 PROCEDURE — 82150 ASSAY OF AMYLASE: CPT

## 2024-02-16 PROCEDURE — 81001 URINALYSIS AUTO W/SCOPE: CPT

## 2024-02-17 ENCOUNTER — APPOINTMENT (OUTPATIENT)
Dept: LAB | Facility: MEDICAL CENTER | Age: 44
End: 2024-02-17
Payer: COMMERCIAL

## 2024-02-17 PROCEDURE — 84410 TESTOSTERONE BIOAVAILABLE: CPT | Performed by: INTERNAL MEDICINE

## 2024-02-17 PROCEDURE — 36415 COLL VENOUS BLD VENIPUNCTURE: CPT | Performed by: INTERNAL MEDICINE

## 2024-02-18 LAB
AMORPH URATE CRY URNS QL MICRO: ABNORMAL
BACTERIA UR QL AUTO: ABNORMAL /HPF
BILIRUB UR QL STRIP: NEGATIVE
CLARITY UR: ABNORMAL
COLOR UR: ABNORMAL
GLUCOSE UR STRIP-MCNC: NEGATIVE MG/DL
HGB UR QL STRIP.AUTO: NEGATIVE
KETONES UR STRIP-MCNC: NEGATIVE MG/DL
LEUKOCYTE ESTERASE UR QL STRIP: NEGATIVE
MUCOUS THREADS UR QL AUTO: ABNORMAL
NITRITE UR QL STRIP: NEGATIVE
NON-SQ EPI CELLS URNS QL MICRO: ABNORMAL /HPF
PH UR STRIP.AUTO: 6.5 [PH]
PROT UR STRIP-MCNC: ABNORMAL MG/DL
RBC #/AREA URNS AUTO: ABNORMAL /HPF
SP GR UR STRIP.AUTO: 1.02 (ref 1–1.03)
UROBILINOGEN UR STRIP-ACNC: <2 MG/DL
WBC #/AREA URNS AUTO: ABNORMAL /HPF

## 2024-02-21 ENCOUNTER — OFFICE VISIT (OUTPATIENT)
Dept: URGENT CARE | Facility: MEDICAL CENTER | Age: 44
End: 2024-02-21
Payer: COMMERCIAL

## 2024-02-21 VITALS
DIASTOLIC BLOOD PRESSURE: 68 MMHG | WEIGHT: 187.4 LBS | OXYGEN SATURATION: 99 % | SYSTOLIC BLOOD PRESSURE: 133 MMHG | TEMPERATURE: 97.2 F | BODY MASS INDEX: 29.41 KG/M2 | RESPIRATION RATE: 89 BRPM | HEIGHT: 67 IN | HEART RATE: 89 BPM

## 2024-02-21 DIAGNOSIS — R42 VERTIGO: Primary | ICD-10-CM

## 2024-02-21 DIAGNOSIS — J01.90 ACUTE SINUSITIS, RECURRENCE NOT SPECIFIED, UNSPECIFIED LOCATION: ICD-10-CM

## 2024-02-21 LAB
DEPRECATED TESTOST FREE FR SERPL: ABNORMAL NG/DL (ref 40–250)
TESTOST SERPL-MCNC: >1500 NG/DL (ref 264–916)
TESTOSTERONE.FREE+WB MFR SERPL: 81.3 % (ref 9–46)

## 2024-02-21 PROCEDURE — 99213 OFFICE O/P EST LOW 20 MIN: CPT

## 2024-02-21 RX ORDER — MECLIZINE HYDROCHLORIDE 25 MG/1
25 TABLET ORAL 3 TIMES DAILY PRN
Qty: 30 TABLET | Refills: 0 | Status: SHIPPED | OUTPATIENT
Start: 2024-02-21

## 2024-02-21 RX ORDER — FLUTICASONE PROPIONATE 50 MCG
1 SPRAY, SUSPENSION (ML) NASAL DAILY
Qty: 9.9 ML | Refills: 0 | Status: SHIPPED | OUTPATIENT
Start: 2024-02-21

## 2024-02-21 NOTE — PROGRESS NOTES
[facility]        NAME: David Bustamante is a 43 y.o. male  : 1980    MRN: [id number]  DATE: 2024  TIME: 10:08 AM    Assessment and Plan   Vertigo [R42]  1. Vertigo  meclizine (ANTIVERT) 25 mg tablet    Ambulatory Referral to Physical Therapy      2. Acute sinusitis, recurrence not specified, unspecified location  fluticasone (FLONASE) 50 mcg/act nasal spray        Presentation consistent with sinusitis with potential component of vertigo. Neurologic examination intact. Likely viral sinusitis - symptoms have only been present for 2 days, no signs of bacterial infection on examination. Prescribed meclizine for vertigo symptoms, referral to PT given. Prescribed Flonase for sinus symptoms, advised other symptomatic treatments.    Patient Instructions     Prescribed meclizine to utilize as needed for dizziness episode. Referral to PT placed.  Your symptoms are likely due to a viral sinusitis. The mainstay of treatment is for symptom relief, see below for recommended medications.  Flonase prescribed, take as directed.  Fever/Body Aches: We recommend you take 600mg ibuprofen every 6 hours or tylenol 650mg every 6 hours as needed for fever. If needed, you can alternate these medications so that you take one medication every 3 hours. For instance, at noon take ibuprofen, then at 3pm take tylenol, then at 6pm take ibuprofen.   Cough: Delsym, an over the counter cough medication may be used every 12 hours as needed.  Mucinex XR (guafenisen) 600 mg tablets may be used to thin out the mucous to make it easier to cough up.  You may take 1-2 tablets twice per day as needed. Utilizing a vaporizer/humidifier may help, as well as increasing fluids.  Sore Throat: Salt water gargles with 1 teaspoon of salt dissolved in 6-8 oz of water as needed can help with a sore throat, as can honey, drinking plenty of liquids, eating soft foods. If severe, can utilize OTC chloraseptic spray.  Nasal Congestion: Over the  counter allergy medication like Claritin, Allegra or Zyrtec can help with nasal congestion and post nasal drip.  Over the counter saline or steroid nasal sprays like Flonase can help with nasal congestion and post nasal drip as well. Over the counter decongestants such as Sudafed may also help.  Upset Stomach: Drink plenty of fluids. May utilize Gatorade, Pedialyte, or other electrolyte solutions to supplement. Eat bland foods (ex: BRAT - bananas, rice, applesauce, toast) and slowly advance to other foods as tolerated.  Please note, if you have heart issues or high blood pressure, the cough/cold medication of choice is Coricidin. Talk to your doctor before trying any new medications.    Follow up with PCP in 3-5 days.  Proceed to  ER if symptoms worsen.    Chief Complaint     Chief Complaint   Patient presents with    Dizziness     Pt c/o dizziness when initially lying down and when getting up for the past 2 days.  He states his head spins for a bit when initially lying down but then stops - and hte same is true upon standing.  When ambulating, he denies dizziness.  States he has started with sinus congestion as well         History of Present Illness       Patient notes 2 days ago when waking up he noticed he felt dizziness. He states the dizziness mainly occurs with position changes - when going to lay down or sitting up after lying down. He notes he also started feeling congested around the same time the dizziness started. Denies sensation of ears feeling clogged, ear pain, muffled hearing. He has tried over the counter decongestant medication without significant relief.        Review of Systems   Review of Systems   Constitutional:  Negative for appetite change, chills and fever.   HENT:  Positive for congestion, postnasal drip, sinus pressure and sneezing. Negative for drooling, ear discharge, ear pain, rhinorrhea, sinus pain and sore throat.    Eyes:  Negative for photophobia, pain, discharge, redness, itching  and visual disturbance.   Respiratory:  Positive for cough (slight). Negative for chest tightness, shortness of breath and wheezing.    Gastrointestinal:  Negative for abdominal pain, diarrhea, nausea and vomiting.   Musculoskeletal:  Negative for myalgias.   Neurological:  Positive for dizziness. Negative for syncope, facial asymmetry, speech difficulty and headaches.   Psychiatric/Behavioral:  Negative for confusion.          Current Medications       Current Outpatient Medications:     albuterol (Ventolin HFA) 90 mcg/act inhaler, Inhale 2 puffs every 6 (six) hours as needed for wheezing, Disp: 18 g, Rfl: 0    amLODIPine (NORVASC) 10 mg tablet, Take 1 tablet (10 mg total) by mouth daily, Disp: 30 tablet, Rfl: 1    Ascorbic Acid (Vitamin C ER) 500 MG CPCR, , Disp: , Rfl:     budesonide-formoterol (SYMBICORT) 160-4.5 mcg/act inhaler, Inhale 2 puffs 2 (two) times a day Rinse mouth after use., Disp: 10.2 g, Rfl: 5    FLUoxetine (PROzac) 40 MG capsule, , Disp: , Rfl:     fluticasone (FLONASE) 50 mcg/act nasal spray, 1 spray into each nostril daily, Disp: 9.9 mL, Rfl: 0    losartan (COZAAR) 25 mg tablet, Take 1 tablet (25 mg total) by mouth daily, Disp: 30 tablet, Rfl: 3    meclizine (ANTIVERT) 25 mg tablet, Take 1 tablet (25 mg total) by mouth 3 (three) times a day as needed for dizziness, Disp: 30 tablet, Rfl: 0    Nutritional Supplements (Ensure Nutrition Shake) LIQD, , Disp: , Rfl:     Omeprazole 20 MG TBEC, , Disp: , Rfl:     QUEtiapine 150 MG TABS, Take 150 mg by mouth daily at bedtime, Disp: 30 tablet, Rfl: 0    traZODone (DESYREL) 100 mg tablet, Take 1 tablet (100 mg total) by mouth daily at bedtime, Disp: 30 tablet, Rfl: 0    Acetaminophen Extra Strength 500 MG TABS, , Disp: , Rfl:     benzonatate (TESSALON) 200 MG capsule, Take 1 capsule (200 mg total) by mouth 3 (three) times a day as needed for cough (Patient not taking: Reported on 2/21/2024), Disp: 20 capsule, Rfl: 0    cloNIDine (CATAPRES) 0.1 mg tablet,  ", Disp: , Rfl:     cyanocobalamin 1,000 mcg/mL, , Disp: , Rfl:     folic acid (FOLVITE) 1 mg tablet, , Disp: , Rfl:     hydrOXYzine HCL (ATARAX) 50 mg tablet, Take 1 tablet (50 mg total) by mouth every 6 (six) hours as needed for anxiety (Patient not taking: Reported on 12/27/2023), Disp: 30 tablet, Rfl: 0    hydrOXYzine pamoate (VISTARIL) 50 mg capsule, , Disp: , Rfl:     methocarbamol (ROBAXIN) 500 mg tablet, , Disp: , Rfl:     predniSONE 10 mg tablet, 20 mg 1 p.o. daily for 5 days and then 10 mg 1 p.o. daily for 5 days (Patient not taking: Reported on 2/21/2024), Disp: 30 tablet, Rfl: 0    Thiamine Mononitrate (VITAMIN B1) 100 mg tablet, Take 1 tablet (100 mg total) by mouth daily (Patient not taking: Reported on 2/21/2024), Disp: 90 tablet, Rfl: 1    Current Allergies     Allergies as of 02/21/2024    (No Known Allergies)            The following portions of the patient's history were reviewed and updated as appropriate: allergies, current medications, past family history, past medical history, past social history, past surgical history and problem list.     Past Medical History:   Diagnosis Date    GERD (gastroesophageal reflux disease)     Hypertension     Rectal bleed        Past Surgical History:   Procedure Laterality Date    EYE SURGERY      SHOULDER SURGERY Right 12/08/2022       Family History   Problem Relation Age of Onset    Hypertension Father     Colon cancer Paternal Aunt          Medications have been verified.        Objective   /68   Pulse 89   Temp (!) 97.2 °F (36.2 °C) (Tympanic)   Resp (!) 89   Ht 5' 7\" (1.702 m)   Wt 85 kg (187 lb 6.4 oz)   SpO2 99%   BMI 29.35 kg/m²        Physical Exam     Physical Exam  Vitals and nursing note reviewed.   Constitutional:       General: He is not in acute distress.     Appearance: Normal appearance. He is not ill-appearing.   HENT:      Right Ear: Tympanic membrane, ear canal and external ear normal.      Left Ear: Tympanic membrane, ear canal " and external ear normal.      Nose: Congestion present. No rhinorrhea.      Mouth/Throat:      Mouth: Mucous membranes are moist.      Pharynx: No oropharyngeal exudate or posterior oropharyngeal erythema.   Eyes:      General: Vision grossly intact. Gaze aligned appropriately.         Right eye: No discharge.         Left eye: No discharge.      Extraocular Movements: Extraocular movements intact.      Right eye: No nystagmus.      Left eye: No nystagmus.      Pupils: Pupils are equal, round, and reactive to light.   Cardiovascular:      Rate and Rhythm: Normal rate and regular rhythm.      Pulses: Normal pulses.      Heart sounds: Normal heart sounds.   Pulmonary:      Effort: Pulmonary effort is normal. No respiratory distress.      Breath sounds: Normal breath sounds. No wheezing, rhonchi or rales.   Musculoskeletal:      Cervical back: Neck supple.   Lymphadenopathy:      Cervical: No cervical adenopathy.   Skin:     General: Skin is warm and dry.   Neurological:      Mental Status: He is alert and oriented to person, place, and time.      Cranial Nerves: Cranial nerves 2-12 are intact.      Sensory: Sensation is intact.      Motor: Motor function is intact.      Coordination: Coordination is intact. Romberg sign negative. Finger-Nose-Finger Test normal.      Gait: Gait is intact.

## 2024-02-21 NOTE — PATIENT INSTRUCTIONS
Prescribed meclizine to utilize as needed for dizziness episode. Referral to PT placed.  Your symptoms are likely due to a viral sinusitis. The mainstay of treatment is for symptom relief, see below for recommended medications.  Flonase prescribed, take as directed.  Fever/Body Aches: We recommend you take 600mg ibuprofen every 6 hours or tylenol 650mg every 6 hours as needed for fever. If needed, you can alternate these medications so that you take one medication every 3 hours. For instance, at noon take ibuprofen, then at 3pm take tylenol, then at 6pm take ibuprofen.   Cough: Delsym, an over the counter cough medication may be used every 12 hours as needed.  Mucinex XR (guafenisen) 600 mg tablets may be used to thin out the mucous to make it easier to cough up.  You may take 1-2 tablets twice per day as needed. Utilizing a vaporizer/humidifier may help, as well as increasing fluids.  Sore Throat: Salt water gargles with 1 teaspoon of salt dissolved in 6-8 oz of water as needed can help with a sore throat, as can honey, drinking plenty of liquids, eating soft foods. If severe, can utilize OTC chloraseptic spray.  Nasal Congestion: Over the counter allergy medication like Claritin, Allegra or Zyrtec can help with nasal congestion and post nasal drip.  Over the counter saline or steroid nasal sprays like Flonase can help with nasal congestion and post nasal drip as well. Over the counter decongestants such as Sudafed may also help.  Upset Stomach: Drink plenty of fluids. May utilize Gatorade, Pedialyte, or other electrolyte solutions to supplement. Eat bland foods (ex: BRAT - bananas, rice, applesauce, toast) and slowly advance to other foods as tolerated.  Please note, if you have heart issues or high blood pressure, the cough/cold medication of choice is Coricidin. Talk to your doctor before trying any new medications.    Follow up with PCP in 3-5 days.  Proceed to  ER if symptoms worsen.    Vertigo   AMBULATORY  CARE:   Vertigo  is a condition that causes you to feel dizzy. You may feel that you or everything around you is moving or spinning. You may also feel like you are being pulled down or toward your side.   Common symptoms that may happen with vertigo:   Nausea or vomiting    Trouble with your balance    Sensitivity to light or sound    Weakness, slurred speech, problems seeing or moving, or increased sleepiness    Facial weakness and headache    Hearing loss, ear fullness or pain, or hearing ringing sounds    Fast, uncontrolled movement of your eyes    Seek care immediately if:   You have a headache and a stiff neck.    You have shaking chills and a fever.     You vomit over and over with no relief.     You have blood, pus, or fluid coming out of your ears.    You are confused.    Contact your healthcare provider if:   Your symptoms do not get better with treatment.     You have questions about your condition or care.    Treatment for vertigo  may include resting in bed or avoid certain activities for a time. You may need to decrease or stop taking medicines that are causing your vertigo. Medicines may also be prescribed to help relieve your symptoms.   Manage your symptoms:   Do not drive , walk without help, or operate heavy machinery when you are dizzy.     Move slowly  when you move from one position to another position. Get up slowly from sitting or lying down. Sit or lie down right away if you feel dizzy.    Drink plenty of liquids.  Liquids help prevent dehydration. Ask how much liquid to drink each day and which liquids are best for you.    Vestibular and balance rehabilitation therapy (VBRT)  is used to teach you exercises to improve your balance and strength. These exercises may help decrease your vertigo and improve your balance. Ask for more information about this therapy.    Follow up with your doctor as directed:  Write down your questions so you remember to ask them during your visits.  © Copyright  [employer] 2023 Information is for End User's use only and may not be sold, redistributed or otherwise used for commercial purposes.  The above information is an  only. It is not intended as medical advice for individual conditions or treatments. Talk to your doctor, nurse or pharmacist before following any medical regimen to see if it is safe and effective for you.      Sinusitis   AMBULATORY CARE:   Sinusitis  is inflammation or infection of your sinuses. Sinusitis is most often caused by a virus. Acute sinusitis may last up to 12 weeks. Chronic sinusitis lasts longer than 12 weeks. Recurrent sinusitis means you have 4 or more infections in 1 year.        Common signs and symptoms:   Fever    Pain, pressure, redness, or swelling around the forehead, cheeks, or eyes    Thick yellow or green discharge from your nose    Tenderness when you touch your face over your sinuses    Dry cough that happens mostly at night or when you lie down    Headache and face pain that is worse when you lean forward    Tooth pain, or pain when you chew    Seek care immediately if:   You have trouble breathing or wheezing that is getting worse.    You have a stiff neck, a fever, or a bad headache.     You cannot open your eye.     Your eyeball bulges out or you cannot move your eye.     You are more sleepy than normal, or you notice changes in your ability to think, move, or talk.    You have swelling of your forehead or scalp.    Call your doctor if:   You have vision changes, such as double vision.    Your eye and eyelid are red, swollen, and painful.     Your symptoms do not improve or go away after 10 days.    You have nausea and are vomiting.    Your nose is bleeding.    You have questions or concerns about your condition or care.    Medicines:  Your symptoms may go away on their own. Your healthcare provider may recommend watchful waiting for up to 10 days before starting antibiotics. You may need any of the  following:  Acetaminophen  decreases pain and fever. It is available without a doctor's order. Ask how much to take and how often to take it. Follow directions. Read the labels of all other medicines you are using to see if they also contain acetaminophen, or ask your doctor or pharmacist. Acetaminophen can cause liver damage if not taken correctly.    NSAIDs , such as ibuprofen, help decrease swelling, pain, and fever. This medicine is available with or without a doctor's order. NSAIDs can cause stomach bleeding or kidney problems in certain people. If you take blood thinner medicine, always ask your healthcare provider if NSAIDs are safe for you. Always read the medicine label and follow directions.    Nasal steroid sprays  may help decrease inflammation in your nose and sinuses.    Decongestants  help reduce swelling and drain mucus in the nose and sinuses. They may help you breathe easier.     Antihistamines  help dry mucus in the nose and relieve sneezing.     Antibiotics  help treat or prevent a bacterial infection.    Self-care:   Rinse your sinuses as directed.  Use a sinus rinse device to rinse your nasal passages with a saline (salt water) solution or distilled water. Do not use tap water. This will help thin the mucus in your nose and rinse away pollen and dirt. It will also help reduce swelling so you can breathe normally.    Use a humidifier  to increase air moisture in your home. This may make it easier for you to breathe and help decrease your cough.     Sleep with your head elevated.  Place an extra pillow under your head before you go to sleep to help your sinuses drain.     Drink liquids as directed.  Ask your healthcare provider how much liquid to drink each day and which liquids are best for you. Liquids will thin the mucus in your nose and help it drain. Avoid drinks that contain alcohol or caffeine.     Do not smoke, and avoid secondhand smoke.  Nicotine and other chemicals in cigarettes and  cigars can make your symptoms worse. Ask your healthcare provider for information if you currently smoke and need help to quit. E-cigarettes or smokeless tobacco still contain nicotine. Talk to your healthcare provider before you use these products.    Prevent the spread of germs:   Wash your hands often with soap and water.  Wash your hands after you use the bathroom, change a child's diaper, or sneeze. Wash your hands before you prepare or eat food.         Stay away from people who are sick.  Some germs spread easily and quickly through contact.    Follow up with your doctor as directed:  You may be referred to an ear, nose, and throat specialist. Write down your questions so you remember to ask them during your visits.   © Copyright [employer] 2023 Information is for End User's use only and may not be sold, redistributed or otherwise used for commercial purposes.  The above information is an  only. It is not intended as medical advice for individual conditions or treatments. Talk to your doctor, nurse or pharmacist before following any medical regimen to see if it is safe and effective for you.

## 2024-03-04 PROBLEM — T14.91XA SUICIDE ATTEMPT (HCC): Status: RESOLVED | Noted: 2023-12-27 | Resolved: 2024-03-04

## 2024-05-30 ENCOUNTER — TELEPHONE (OUTPATIENT)
Age: 44
End: 2024-05-30

## 2024-05-30 DIAGNOSIS — R21 RASH OF FACE: Primary | ICD-10-CM

## 2024-05-30 NOTE — TELEPHONE ENCOUNTER
Patient is requesting an insurance referral for the following specialty:      Test Name / Order Name: spot on face/skin check    DX Code:     Date Of Service: 6/4/24    Location/Facility Name/Address/Phone #: [facility], [place] [address]; phone [phone]    Location / Facility NPI: [id number]    Best Phone # To Reach The Patient:  [phone]

## 2024-06-04 ENCOUNTER — TELEPHONE (OUTPATIENT)
Age: 44
End: 2024-06-04

## 2024-06-04 NOTE — TELEPHONE ENCOUNTER
Pt's wife called to check status of authorization for dermatology.  Gave her the certification number.

## 2024-09-05 ENCOUNTER — OFFICE VISIT (OUTPATIENT)
Dept: FAMILY MEDICINE CLINIC | Facility: CLINIC | Age: 44
End: 2024-09-05
Payer: COMMERCIAL

## 2024-09-05 ENCOUNTER — TELEPHONE (OUTPATIENT)
Age: 44
End: 2024-09-05

## 2024-09-05 VITALS
TEMPERATURE: 98.8 F | BODY MASS INDEX: 28.72 KG/M2 | HEIGHT: 67 IN | OXYGEN SATURATION: 98 % | HEART RATE: 81 BPM | DIASTOLIC BLOOD PRESSURE: 90 MMHG | SYSTOLIC BLOOD PRESSURE: 130 MMHG | WEIGHT: 183 LBS

## 2024-09-05 DIAGNOSIS — Z72.0 TOBACCO USE: ICD-10-CM

## 2024-09-05 DIAGNOSIS — R06.09 DYSPNEA ON EXERTION: ICD-10-CM

## 2024-09-05 DIAGNOSIS — Z00.00 ANNUAL PHYSICAL EXAM: Primary | ICD-10-CM

## 2024-09-05 DIAGNOSIS — R06.83 LOUD SNORING: ICD-10-CM

## 2024-09-05 DIAGNOSIS — R05.3 CHRONIC COUGH: ICD-10-CM

## 2024-09-05 DIAGNOSIS — F33.1 MODERATE EPISODE OF RECURRENT MAJOR DEPRESSIVE DISORDER (HCC): ICD-10-CM

## 2024-09-05 DIAGNOSIS — R10.13 DYSPEPSIA: ICD-10-CM

## 2024-09-05 PROCEDURE — 99396 PREV VISIT EST AGE 40-64: CPT | Performed by: INTERNAL MEDICINE

## 2024-09-05 RX ORDER — ALBUTEROL SULFATE 90 UG/1
2 AEROSOL, METERED RESPIRATORY (INHALATION) EVERY 6 HOURS PRN
Qty: 18 G | Refills: 0 | Status: SHIPPED | OUTPATIENT
Start: 2024-09-05

## 2024-09-05 RX ORDER — FAMOTIDINE 40 MG/1
40 TABLET, FILM COATED ORAL DAILY
Qty: 30 TABLET | Refills: 3 | Status: SHIPPED | OUTPATIENT
Start: 2024-09-05 | End: 2025-08-31

## 2024-09-05 RX ORDER — TRAZODONE HYDROCHLORIDE 100 MG/1
TABLET ORAL
Qty: 45 TABLET | Refills: 3 | Status: SHIPPED | OUTPATIENT
Start: 2024-09-05

## 2024-09-05 RX ORDER — PANTOPRAZOLE SODIUM 40 MG/1
40 TABLET, DELAYED RELEASE ORAL
Qty: 30 TABLET | Refills: 1 | Status: SHIPPED | OUTPATIENT
Start: 2024-09-05 | End: 2025-03-04

## 2024-09-05 NOTE — PROGRESS NOTES
Adult Annual Physical  Name: David Bustamante      : 1980      MRN: [id number]  Encounter Provider: Jamila Hill MD  Encounter Date: 2024   Encounter department: [facility]    Assessment & Plan   1. Annual physical exam  2. Loud snoring  -     Ambulatory Referral to Sleep Medicine; Future  3. Tobacco use  -     CT chest wo contrast; Future; Expected date: 2024  -     Ambulatory Referral to Gastroenterology; Future  4. Dyspnea on exertion  -     CT chest wo contrast; Future; Expected date: 2024  5. Dyspepsia  -     pantoprazole (PROTONIX) 40 mg tablet; Take 1 tablet (40 mg total) by mouth daily before breakfast  -     famotidine (PEPCID) 40 MG tablet; Take 1 tablet (40 mg total) by mouth daily  -     Ambulatory Referral to Gastroenterology; Future  6. Chronic cough  -     albuterol (Ventolin HFA) 90 mcg/act inhaler; Inhale 2 puffs every 6 (six) hours as needed for wheezing  7. Moderate episode of recurrent major depressive disorder (HCC)  -     traZODone (DESYREL) 100 mg tablet; Take 1-1/2 pill at night    Immunizations and preventive care screenings were discussed with patient today. Appropriate education was printed on patient's after visit summary.        Counseling:  See below  Patient is here for annual physical family has been getting more short of breath on exertion.  Patient had stopped taking amlodipine and.  His blood pressure is quite well without any medication.  He has not been drinking for 9 months now.  He is still out of work but working out regularly in the gym and attending alcoholic Anonymous.  He did admit that he was taking testosterone which caused his last testosterone levels to be elevated.  He has stopped doing so.  He does complain of dyspepsia quite significant.  I will start him on pantoprazole as well as Pepcid that he is taking already and refer him to see GI given history of alcoholism as well as tobacco use which she is still  doing at this point.  Patient has been complaining of shortness of breath and cough and wheezing.  A CT scan would be ordered.  He does use albuterol inhaler which I will be renewing as well as Symbicort.  We had discussed sleep study in the past and patient wants to do so.  This will be ordered.             History of Present Illness     Adult Annual Physical  Review of Systems  Pertinent Medical History       Past Medical History   Past Medical History:   Diagnosis Date    GERD (gastroesophageal reflux disease)     Hypertension     Rectal bleed     Suicide attempt (HCC) 12/27/2023     Past Surgical History:   Procedure Laterality Date    EYE SURGERY      SHOULDER SURGERY Right 12/08/2022     Family History   Problem Relation Age of Onset    Hypertension Father     Colon cancer Paternal Aunt      Current Outpatient Medications on File Prior to Visit   Medication Sig Dispense Refill    budesonide-formoterol (SYMBICORT) 160-4.5 mcg/act inhaler Inhale 2 puffs 2 (two) times a day Rinse mouth after use. 10.2 g 5    [employer] Melatonin Quick-Dissolve 5 MG TBDP       FLUoxetine (PROzac) 40 MG capsule       fluticasone (FLONASE) 50 mcg/act nasal spray 1 spray into each nostril daily 9.9 mL 0    multivitamin (THERAGRAN) TABS Take 1 tablet by mouth if needed      Nutritional Supplements (Ensure Nutrition Shake) LIQD       QUEtiapine 150 MG TABS Take 150 mg by mouth daily at bedtime 30 tablet 0    Thiamine Mononitrate (VITAMIN B1) 100 mg tablet Take 1 tablet (100 mg total) by mouth daily 90 tablet 1    [DISCONTINUED] albuterol (Ventolin HFA) 90 mcg/act inhaler Inhale 2 puffs every 6 (six) hours as needed for wheezing (Patient taking differently: Inhale 2 puffs if needed for wheezing) 18 g 0    [DISCONTINUED] amLODIPine (NORVASC) 10 mg tablet Take 1 tablet (10 mg total) by mouth daily 30 tablet 1    [DISCONTINUED] losartan (COZAAR) 25 mg tablet Take 1 tablet (25 mg total) by mouth daily 30 tablet 3    [DISCONTINUED] Omeprazole 20 MG  TBEC       [DISCONTINUED] traZODone (DESYREL) 100 mg tablet Take 1 tablet (100 mg total) by mouth daily at bedtime 30 tablet 0    Acetaminophen Extra Strength 500 MG TABS  (Patient not taking: Reported on 12/27/2023)      cloNIDine (CATAPRES) 0.1 mg tablet  (Patient not taking: Reported on 12/27/2023)      cyanocobalamin 1,000 mcg/mL  (Patient not taking: Reported on 2/21/2024)      folic acid (FOLVITE) 1 mg tablet  (Patient not taking: Reported on 12/27/2023)      hydrOXYzine HCL (ATARAX) 50 mg tablet Take 1 tablet (50 mg total) by mouth every 6 (six) hours as needed for anxiety (Patient not taking: Reported on 12/27/2023) 30 tablet 0    hydrOXYzine pamoate (VISTARIL) 50 mg capsule  (Patient not taking: Reported on 2/21/2024)      meclizine (ANTIVERT) 25 mg tablet Take 1 tablet (25 mg total) by mouth 3 (three) times a day as needed for dizziness (Patient not taking: Reported on 3/4/2024) 30 tablet 0    methocarbamol (ROBAXIN) 500 mg tablet  (Patient not taking: Reported on 2/21/2024)      ondansetron (ZOFRAN-ODT) 4 mg disintegrating tablet  (Patient not taking: Reported on 3/4/2024)      predniSONE 10 mg tablet 20 mg 1 p.o. daily for 5 days and then 10 mg 1 p.o. daily for 5 days (Patient not taking: Reported on 2/21/2024) 30 tablet 0    [DISCONTINUED] Ascorbic Acid (Vitamin C ER) 500 MG CPCR  (Patient not taking: Reported on 3/4/2024)      [DISCONTINUED] azithromycin (ZITHROMAX) 250 mg tablet  (Patient not taking: Reported on 3/4/2024)      [DISCONTINUED] benzonatate (TESSALON) 200 MG capsule Take 1 capsule (200 mg total) by mouth 3 (three) times a day as needed for cough (Patient not taking: Reported on 9/5/2024) 20 capsule 0     No current facility-administered medications on file prior to visit.   No Known Allergies   Current Outpatient Medications on File Prior to Visit   Medication Sig Dispense Refill    budesonide-formoterol (SYMBICORT) 160-4.5 mcg/act inhaler Inhale 2 puffs 2 (two) times a day Rinse mouth  after use. 10.2 g 5    [employer] Melatonin Quick-Dissolve 5 MG TBDP       FLUoxetine (PROzac) 40 MG capsule       fluticasone (FLONASE) 50 mcg/act nasal spray 1 spray into each nostril daily 9.9 mL 0    multivitamin (THERAGRAN) TABS Take 1 tablet by mouth if needed      Nutritional Supplements (Ensure Nutrition Shake) LIQD       QUEtiapine 150 MG TABS Take 150 mg by mouth daily at bedtime 30 tablet 0    Thiamine Mononitrate (VITAMIN B1) 100 mg tablet Take 1 tablet (100 mg total) by mouth daily 90 tablet 1    [DISCONTINUED] albuterol (Ventolin HFA) 90 mcg/act inhaler Inhale 2 puffs every 6 (six) hours as needed for wheezing (Patient taking differently: Inhale 2 puffs if needed for wheezing) 18 g 0    [DISCONTINUED] amLODIPine (NORVASC) 10 mg tablet Take 1 tablet (10 mg total) by mouth daily 30 tablet 1    [DISCONTINUED] losartan (COZAAR) 25 mg tablet Take 1 tablet (25 mg total) by mouth daily 30 tablet 3    [DISCONTINUED] Omeprazole 20 MG TBEC       [DISCONTINUED] traZODone (DESYREL) 100 mg tablet Take 1 tablet (100 mg total) by mouth daily at bedtime 30 tablet 0    Acetaminophen Extra Strength 500 MG TABS  (Patient not taking: Reported on 12/27/2023)      cloNIDine (CATAPRES) 0.1 mg tablet  (Patient not taking: Reported on 12/27/2023)      cyanocobalamin 1,000 mcg/mL  (Patient not taking: Reported on 2/21/2024)      folic acid (FOLVITE) 1 mg tablet  (Patient not taking: Reported on 12/27/2023)      hydrOXYzine HCL (ATARAX) 50 mg tablet Take 1 tablet (50 mg total) by mouth every 6 (six) hours as needed for anxiety (Patient not taking: Reported on 12/27/2023) 30 tablet 0    hydrOXYzine pamoate (VISTARIL) 50 mg capsule  (Patient not taking: Reported on 2/21/2024)      meclizine (ANTIVERT) 25 mg tablet Take 1 tablet (25 mg total) by mouth 3 (three) times a day as needed for dizziness (Patient not taking: Reported on 3/4/2024) 30 tablet 0    methocarbamol (ROBAXIN) 500 mg tablet  (Patient not taking: Reported on 2/21/2024)    "   ondansetron (ZOFRAN-ODT) 4 mg disintegrating tablet  (Patient not taking: Reported on 3/4/2024)      predniSONE 10 mg tablet 20 mg 1 p.o. daily for 5 days and then 10 mg 1 p.o. daily for 5 days (Patient not taking: Reported on 2/21/2024) 30 tablet 0    [DISCONTINUED] Ascorbic Acid (Vitamin C ER) 500 MG CPCR  (Patient not taking: Reported on 3/4/2024)      [DISCONTINUED] azithromycin (ZITHROMAX) 250 mg tablet  (Patient not taking: Reported on 3/4/2024)      [DISCONTINUED] benzonatate (TESSALON) 200 MG capsule Take 1 capsule (200 mg total) by mouth 3 (three) times a day as needed for cough (Patient not taking: Reported on 9/5/2024) 20 capsule 0     No current facility-administered medications on file prior to visit.      Social History     Tobacco Use    Smoking status: Every Day     Current packs/day: 1.00     Types: Cigarettes    Smokeless tobacco: Never   Vaping Use    Vaping status: Never Used   Substance and Sexual Activity    Alcohol use: Yes     Comment: 15 shots of vodka/day    Drug use: Never    Sexual activity: Yes     Partners: Female       Objective     /90 (BP Location: Right arm, Patient Position: Sitting, Cuff Size: Large)   Pulse 81   Temp 98.8 °F (37.1 °C) (Tympanic)   Ht 5' 7\" (1.702 m)   Wt 83 kg (183 lb)   SpO2 98%   BMI 28.66 kg/m²     Physical Exam  Vitals and nursing note reviewed.   Constitutional:       General: He is not in acute distress.     Appearance: He is well-developed.   HENT:      Head: Normocephalic and atraumatic.      Comments: Narrow oropharynx  Eyes:      Conjunctiva/sclera: Conjunctivae normal.   Neck:      Vascular: No carotid bruit.   Cardiovascular:      Rate and Rhythm: Normal rate and regular rhythm.      Heart sounds: Normal heart sounds. No murmur heard.  Pulmonary:      Effort: Pulmonary effort is normal. No respiratory distress.      Breath sounds: Normal breath sounds. No wheezing or rales.   Abdominal:      General: There is no distension.      " Palpations: Abdomen is soft. There is no mass.      Tenderness: There is no abdominal tenderness. There is no guarding.   Musculoskeletal:         General: No swelling.      Cervical back: Neck supple.      Right lower leg: No edema.      Left lower leg: No edema.   Lymphadenopathy:      Cervical: No cervical adenopathy.   Skin:     General: Skin is warm and dry.      Capillary Refill: Capillary refill takes less than 2 seconds.   Neurological:      Mental Status: He is alert.   Psychiatric:         Mood and Affect: Mood normal.         Behavior: Behavior normal.       Administrative Statements   I have spent a total time of 30 minutes in caring for this patient on the day of the visit/encounter including Diagnostic results, Prognosis, Risks and benefits of tx options, Instructions for management, Patient and family education, Importance of tx compliance, Risk factor reductions, Impressions, Counseling / Coordination of care, Documenting in the medical record, Reviewing / ordering tests, medicine, procedures  , and Obtaining or reviewing history  .

## 2024-09-05 NOTE — PATIENT INSTRUCTIONS
"Patient Education     Routine physical for adults   The Basics   Written by the doctors and editors at [facility]   What is a physical? -- A physical is a routine visit, or \"check-up,\" with your doctor. You might also hear it called a \"wellness visit\" or \"preventive visit.\"  During each visit, the doctor will:   Ask about your physical and mental health   Ask about your habits, behaviors, and lifestyle   Do an exam   Give you vaccines if needed   Talk to you about any medicines you take   Give advice about your health   Answer your questions  Getting regular check-ups is an important part of taking care of your health. It can help your doctor find and treat any problems you have. But it's also important for preventing health problems.  A routine physical is different from a \"sick visit.\" A sick visit is when you see a doctor because of a health concern or problem. Since physicals are scheduled ahead of time, you can think about what you want to ask the doctor.  How often should I get a physical? -- It depends on your age and health. In general, for people age 21 years and older:   If you are younger than 50 years, you might be able to get a physical every 3 years.   If you are 50 years or older, your doctor might recommend a physical every year.  If you have an ongoing health condition, like diabetes or high blood pressure, your doctor will probably want to see you more often.  What happens during a physical? -- In general, each visit will include:   Physical exam - The doctor or nurse will check your height, weight, heart rate, and blood pressure. They will also look at your eyes and ears. They will ask about how you are feeling and whether you have any symptoms that bother you.   Medicines - It's a good idea to bring a list of all the medicines you take to each doctor visit. Your doctor will talk to you about your medicines and answer any questions. Tell them if you are having any side effects that bother you. You " "should also tell them if you are having trouble paying for any of your medicines.   Habits and behaviors - This includes:   Your diet   Your exercise habits   Whether you smoke, drink alcohol, or use drugs   Whether you are sexually active   Whether you feel safe at home  Your doctor will talk to you about things you can do to improve your health and lower your risk of health problems. They will also offer help and support. For example, if you want to quit smoking, they can give you advice and might prescribe medicines. If you want to improve your diet or get more physical activity, they can help you with this, too.   Lab tests, if needed - The tests you get will depend on your age and situation. For example, your doctor might want to check your:   Cholesterol   Blood sugar   Iron level   Vaccines - The recommended vaccines will depend on your age, health, and what vaccines you already had. Vaccines are very important because they can prevent certain serious or deadly infections.   Discussion of screening - \"Screening\" means checking for diseases or other health problems before they cause symptoms. Your doctor can recommend screening based on your age, risk, and preferences. This might include tests to check for:   Cancer, such as breast, prostate, cervical, ovarian, colorectal, prostate, lung, or skin cancer   Sexually transmitted infections, such as chlamydia and gonorrhea   Mental health conditions like depression and anxiety  Your doctor will talk to you about the different types of screening tests. They can help you decide which screenings to have. They can also explain what the results might mean.   Answering questions - The physical is a good time to ask the doctor or nurse questions about your health. If needed, they can refer you to other doctors or specialists, too.  Adults older than 65 years often need other care, too. As you get older, your doctor will talk to you about:   How to prevent falling at " home   Hearing or vision tests   Memory testing   How to take your medicines safely   Making sure that you have the help and support you need at home  All topics are updated as new evidence becomes available and our peer review process is complete.  This topic retrieved from [employer] on: May 02, 2024.  Topic 809023 Version 1.0  Release: 32.4.3 - C32.122  © 2024 UpToDate, Inc. and/or its affiliates. All rights reserved.  Consumer Information Use and Disclaimer   Disclaimer: This generalized information is a limited summary of diagnosis, treatment, and/or medication information. It is not meant to be comprehensive and should be used as a tool to help the user understand and/or assess potential diagnostic and treatment options. It does NOT include all information about conditions, treatments, medications, side effects, or risks that may apply to a specific patient. It is not intended to be medical advice or a substitute for the medical advice, diagnosis, or treatment of a health care provider based on the health care provider's examination and assessment of a patient's specific and unique circumstances. Patients must speak with a health care provider for complete information about their health, medical questions, and treatment options, including any risks or benefits regarding use of medications. This information does not endorse any treatments or medications as safe, effective, or approved for treating a specific patient. UpToDate, Inc. and its affiliates disclaim any warranty or liability relating to this information or the use thereof.The use of this information is governed by the Terms of Use, available at https://www.woltersDevariouwer.com/en/know/clinical-effectiveness-terms. 2024© UpToDate, Inc. and its affiliates and/or licensors. All rights reserved.  Copyright   © 2024 UpToDate, Inc. and/or its affiliates. All rights reserved.    Patient Education     Routine physical for adults   The Basics   Written by the  "doctors and editors at [facility]   What is a physical? -- A physical is a routine visit, or \"check-up,\" with your doctor. You might also hear it called a \"wellness visit\" or \"preventive visit.\"  During each visit, the doctor will:   Ask about your physical and mental health   Ask about your habits, behaviors, and lifestyle   Do an exam   Give you vaccines if needed   Talk to you about any medicines you take   Give advice about your health   Answer your questions  Getting regular check-ups is an important part of taking care of your health. It can help your doctor find and treat any problems you have. But it's also important for preventing health problems.  A routine physical is different from a \"sick visit.\" A sick visit is when you see a doctor because of a health concern or problem. Since physicals are scheduled ahead of time, you can think about what you want to ask the doctor.  How often should I get a physical? -- It depends on your age and health. In general, for people age 21 years and older:   If you are younger than 50 years, you might be able to get a physical every 3 years.   If you are 50 years or older, your doctor might recommend a physical every year.  If you have an ongoing health condition, like diabetes or high blood pressure, your doctor will probably want to see you more often.  What happens during a physical? -- In general, each visit will include:   Physical exam - The doctor or nurse will check your height, weight, heart rate, and blood pressure. They will also look at your eyes and ears. They will ask about how you are feeling and whether you have any symptoms that bother you.   Medicines - It's a good idea to bring a list of all the medicines you take to each doctor visit. Your doctor will talk to you about your medicines and answer any questions. Tell them if you are having any side effects that bother you. You should also tell them if you are having trouble paying for any of your " "medicines.   Habits and behaviors - This includes:   Your diet   Your exercise habits   Whether you smoke, drink alcohol, or use drugs   Whether you are sexually active   Whether you feel safe at home  Your doctor will talk to you about things you can do to improve your health and lower your risk of health problems. They will also offer help and support. For example, if you want to quit smoking, they can give you advice and might prescribe medicines. If you want to improve your diet or get more physical activity, they can help you with this, too.   Lab tests, if needed - The tests you get will depend on your age and situation. For example, your doctor might want to check your:   Cholesterol   Blood sugar   Iron level   Vaccines - The recommended vaccines will depend on your age, health, and what vaccines you already had. Vaccines are very important because they can prevent certain serious or deadly infections.   Discussion of screening - \"Screening\" means checking for diseases or other health problems before they cause symptoms. Your doctor can recommend screening based on your age, risk, and preferences. This might include tests to check for:   Cancer, such as breast, prostate, cervical, ovarian, colorectal, prostate, lung, or skin cancer   Sexually transmitted infections, such as chlamydia and gonorrhea   Mental health conditions like depression and anxiety  Your doctor will talk to you about the different types of screening tests. They can help you decide which screenings to have. They can also explain what the results might mean.   Answering questions - The physical is a good time to ask the doctor or nurse questions about your health. If needed, they can refer you to other doctors or specialists, too.  Adults older than 65 years often need other care, too. As you get older, your doctor will talk to you about:   How to prevent falling at home   Hearing or vision tests   Memory testing   How to take your " medicines safely   Making sure that you have the help and support you need at home  All topics are updated as new evidence becomes available and our peer review process is complete.  This topic retrieved from [employer] on: May 02, 2024.  Topic 506537 Version 1.0  Release: 32.4.3 - C32.122  © 2024 UpToDate, Inc. and/or its affiliates. All rights reserved.  Consumer Information Use and Disclaimer   Disclaimer: This generalized information is a limited summary of diagnosis, treatment, and/or medication information. It is not meant to be comprehensive and should be used as a tool to help the user understand and/or assess potential diagnostic and treatment options. It does NOT include all information about conditions, treatments, medications, side effects, or risks that may apply to a specific patient. It is not intended to be medical advice or a substitute for the medical advice, diagnosis, or treatment of a health care provider based on the health care provider's examination and assessment of a patient's specific and unique circumstances. Patients must speak with a health care provider for complete information about their health, medical questions, and treatment options, including any risks or benefits regarding use of medications. This information does not endorse any treatments or medications as safe, effective, or approved for treating a specific patient. UpToDate, Inc. and its affiliates disclaim any warranty or liability relating to this information or the use thereof.The use of this information is governed by the Terms of Use, available at https://www.woltersRAZ Mobileuwer.com/en/know/clinical-effectiveness-terms. 2024© UpToDate, Inc. and its affiliates and/or licensors. All rights reserved.  Copyright   © 2024 UpToDate, Inc. and/or its affiliates. All rights reserved.

## 2024-09-05 NOTE — PROGRESS NOTES
"Assessment/Plan:    No problem-specific Assessment & Plan notes found for this encounter.       {Assess/PlanSmartLinks:52899}      Subjective:      Patient ID: David Bustamante is a 44 y.o. male.    HPI  Blood pressure good without any medication  No alcohol for 9 months  Dyspepsia start pantoprazole continue with Zantac GI consultation  Sleep study  Wheezing and shortness of breath CT chest  {[employer] ambulatory SmartLinks:[id number]}    Review of Systems      Objective:      /90 (BP Location: Right arm, Patient Position: Sitting, Cuff Size: Large)   Pulse 81   Temp 98.8 °F (37.1 °C) (Tympanic)   Ht 5' 7\" (1.702 m)   Wt 83 kg (183 lb)   SpO2 98%   BMI 28.66 kg/m²          Physical Exam            "

## 2024-09-13 ENCOUNTER — TELEPHONE (OUTPATIENT)
Age: 44
End: 2024-09-13

## 2024-09-13 ENCOUNTER — HOSPITAL ENCOUNTER (OUTPATIENT)
Dept: RADIOLOGY | Facility: IMAGING CENTER | Age: 44
End: 2024-09-13
Payer: COMMERCIAL

## 2024-09-13 DIAGNOSIS — R06.09 DYSPNEA ON EXERTION: ICD-10-CM

## 2024-09-13 DIAGNOSIS — Z72.0 TOBACCO USE: ICD-10-CM

## 2024-09-13 PROCEDURE — 71250 CT THORAX DX C-: CPT

## 2024-09-13 NOTE — TELEPHONE ENCOUNTER
Pts wife called back in to see if [facility]

## 2024-09-13 NOTE — TELEPHONE ENCOUNTER
Pts spouse (on Consent) called to get next available dates for at home sleep study. Pavithra stated that [facility] can't get pt in until Jan.     I reached out to Jennifer who stated for [facility] [place], [place] we have openings in OCT. [facility] has openings in Nov.      Advised pts spouse of the openings and the possible time frame of process (7-14 business days). Advised depending on responses from insurance and if all documents needed from referring provider are sent in.    Advised once processed the patient will get a call from the office to schedule. Also provided our fax# [phone].    Pavithra understood and will call pts provider for referral.

## 2024-09-16 ENCOUNTER — TELEPHONE (OUTPATIENT)
Dept: FAMILY MEDICINE CLINIC | Facility: CLINIC | Age: 44
End: 2024-09-16

## 2024-09-28 DIAGNOSIS — R05.3 CHRONIC COUGH: ICD-10-CM

## 2024-09-28 RX ORDER — ALBUTEROL SULFATE 90 UG/1
INHALANT RESPIRATORY (INHALATION)
Qty: 18 G | Refills: 1 | Status: SHIPPED | OUTPATIENT
Start: 2024-09-28

## 2024-10-01 ENCOUNTER — OFFICE VISIT (OUTPATIENT)
Dept: GASTROENTEROLOGY | Facility: MEDICAL CENTER | Age: 44
End: 2024-10-01
Payer: COMMERCIAL

## 2024-10-01 VITALS
DIASTOLIC BLOOD PRESSURE: 86 MMHG | SYSTOLIC BLOOD PRESSURE: 134 MMHG | TEMPERATURE: 98.5 F | WEIGHT: 180.8 LBS | BODY MASS INDEX: 28.32 KG/M2 | HEART RATE: 85 BPM

## 2024-10-01 DIAGNOSIS — R10.13 DYSPEPSIA: ICD-10-CM

## 2024-10-01 DIAGNOSIS — Z12.11 COLON CANCER SCREENING: ICD-10-CM

## 2024-10-01 DIAGNOSIS — K21.9 GASTROESOPHAGEAL REFLUX DISEASE, UNSPECIFIED WHETHER ESOPHAGITIS PRESENT: Primary | ICD-10-CM

## 2024-10-01 DIAGNOSIS — K64.9 HEMORRHOIDS, UNSPECIFIED HEMORRHOID TYPE: ICD-10-CM

## 2024-10-01 DIAGNOSIS — Z72.0 TOBACCO USE: ICD-10-CM

## 2024-10-01 PROCEDURE — 99243 OFF/OP CNSLTJ NEW/EST LOW 30: CPT | Performed by: INTERNAL MEDICINE

## 2024-10-01 NOTE — PROGRESS NOTES
[facility] - Outpatient Consultation  David Bustamante 44 y.o. male MRN: [id number]  Encounter: [id number]          ASSESSMENT AND PLAN:      1. Tobacco use  2. Dyspepsia  3. Gastroesophageal reflux disease, unspecified whether esophagitis present  Patient responded to PPI treatment instantly.  He no longer has ongoing symptoms.  He has been on PPI for 1 month.  Patient was counseled on correct use of PPI.  There is no alarming symptoms to indicating immediate endoscopic investigation.  Patient should be followed up every year for acid reflux.  He was counseled on the importance of lifestyle modification including weight loss, avoiding lying down within 4 hours of eating, avoiding food that can trigger the symptoms.    4. Colon cancer screening  Colonoscopy to be scheduled in 2025.    5. Hemorrhoids, unspecified hemorrhoid type  Patient denies rectal bleeding.  Recent hemoglobin was normal.  Symptoms are more consistent with nonbleeding hemorrhoids.  Patient was counseled on importance of high-fiber diet and increased water intake and warm water sitz bath.    ______________________________________________________________________    HPI: 44-year-old man referred for evaluation of acid reflux and hemorrhoids.  Patient reported he has been having acid reflux in the past 1 year.  He reported postprandial heartburn and nocturnal symptoms when he eats late.  He denies dysphagia.  He denies nausea or vomiting.  He recently was prescribed with PPI daily and his symptoms resolved completely.  He is an active smoker.  Denies family history of esophageal cancer or colon cancer.  He reported regular formed bowel movement.  But sometimes when he strains he feels hemorrhoids coming out.  But it will return to normal position when he sits.  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  He stands most of his times.      REVIEW OF SYSTEMS:    CONSTITUTIONAL: Denies any fever, chills, rigors, and weight  loss.  HEENT: No earache or tinnitus. Denies hearing loss or visual disturbances.  CARDIOVASCULAR: No chest pain or palpitations.   RESPIRATORY: Denies any cough, hemoptysis, shortness of breath or dyspnea on exertion.  GASTROINTESTINAL: As noted in the History of Present Illness.   GENITOURINARY: No problems with urination. Denies any hematuria or dysuria.  NEUROLOGIC: No dizziness or vertigo, denies headaches.   MUSCULOSKELETAL: Denies any muscle or joint pain.   SKIN: Denies skin rashes or itching.   ENDOCRINE: Denies excessive thirst. Denies intolerance to heat or cold.  PSYCHOSOCIAL: Denies depression or anxiety. Denies any recent memory loss.       Historical Information   Past Medical History:   Diagnosis Date    GERD (gastroesophageal reflux disease)     Hypertension     Rectal bleed     Suicide attempt (HCC) 12/27/2023     Past Surgical History:   Procedure Laterality Date    EYE SURGERY      SHOULDER SURGERY Right 12/08/2022     Social History   Social History     Substance and Sexual Activity   Alcohol Use Yes    Comment: 15 shots of vodka/day     Social History     Substance and Sexual Activity   Drug Use Never     Social History     Tobacco Use   Smoking Status Every Day    Current packs/day: 1.00    Types: Cigarettes   Smokeless Tobacco Never     Family History   Problem Relation Age of Onset    Hypertension Father     Colon cancer Paternal Aunt        Meds/Allergies       Current Outpatient Medications:     albuterol (PROVENTIL HFA,VENTOLIN HFA) 90 mcg/act inhaler    budesonide-formoterol (SYMBICORT) 160-4.5 mcg/act inhaler    [employer] Melatonin Quick-Dissolve 5 MG TBDP    FLUoxetine (PROzac) 40 MG capsule    fluticasone (FLONASE) 50 mcg/act nasal spray    multivitamin (THERAGRAN) TABS    Nutritional Supplements (Ensure Nutrition Shake) LIQD    pantoprazole (PROTONIX) 40 mg tablet    QUEtiapine 150 MG TABS    Thiamine Mononitrate (VITAMIN B1) 100 mg tablet    traZODone (DESYREL) 100 mg tablet     Acetaminophen Extra Strength 500 MG TABS    cloNIDine (CATAPRES) 0.1 mg tablet    cyanocobalamin 1,000 mcg/mL    famotidine (PEPCID) 40 MG tablet    folic acid (FOLVITE) 1 mg tablet    hydrOXYzine HCL (ATARAX) 50 mg tablet    hydrOXYzine pamoate (VISTARIL) 50 mg capsule    meclizine (ANTIVERT) 25 mg tablet    methocarbamol (ROBAXIN) 500 mg tablet    ondansetron (ZOFRAN-ODT) 4 mg disintegrating tablet    predniSONE 10 mg tablet    No Known Allergies        Objective     Blood pressure 134/86, pulse 85, temperature 98.5 °F (36.9 °C), weight 82 kg (180 lb 12.8 oz). Body mass index is 28.32 kg/m².        PHYSICAL EXAM:      General Appearance:   Alert, cooperative, no distress   HEENT:   Normocephalic, atraumatic, anicteric.     Neck:  Supple, symmetrical, trachea midline   Lungs:   Clear to auscultation bilaterally; no rales, rhonchi or wheezing; respirations unlabored    Heart::   Regular rate and rhythm; no murmur, rub, or gallop.   Abdomen:   Soft, non-tender, non-distended; normal bowel sounds; no masses, no organomegaly    Genitalia:   Deferred    Rectal:   Deferred    Extremities:  No cyanosis, clubbing or edema    Pulses:  2+ and symmetric    Skin:  No jaundice, rashes, or lesions    Lymph nodes:  No palpable cervical lymphadenopathy        Lab Results:   No visits with results within 1 Day(s) from this visit.   Latest known visit with results is:   Appointment on 02/16/2024   Component Date Value    Lipase 02/16/2024 27     Amylase 02/16/2024 60     Color, UA 02/16/2024 Orange     Clarity, UA 02/16/2024 Extra Turbid     Specific Gravity, UA 02/16/2024 1.023     pH, UA 02/16/2024 6.5     Leukocytes, UA 02/16/2024 Negative     Nitrite, UA 02/16/2024 Negative     Protein, UA 02/16/2024 50 (1+) (A)     Glucose, UA 02/16/2024 Negative     Ketones, UA 02/16/2024 Negative     Urobilinogen, UA 02/16/2024 <2.0     Bilirubin, UA 02/16/2024 Negative     Occult Blood, UA 02/16/2024 Negative     RBC, UA 02/16/2024 None Seen      WBC, UA 02/16/2024 None Seen     Epithelial Cells 02/16/2024 None Seen     Bacteria, UA 02/16/2024 None Seen     MUCUS THREADS 02/16/2024 None Seen     Amorphous Crystals, UA 02/16/2024 Innumerable          Radiology Results:   CT chest wo contrast    Result Date: 9/16/2024  Narrative: CT CHEST WITHOUT IV CONTRAST INDICATION: Z72.0: Tobacco use R06.09: Other forms of dyspnea. COMPARISON: None. TECHNIQUE: CT examination of the chest was performed without intravenous contrast. Multiplanar 2D reformatted images were created from the source data. This examination, like all CT scans performed in the [facility], was performed utilizing techniques to minimize radiation dose exposure, including the use of iterative reconstruction and automated exposure control. Radiation dose length product (DLP) for this visit: 275 mGy-cm FINDINGS: LUNGS: Lungs are clear. There is no tracheal or endobronchial lesion. PLEURA: Unremarkable. HEART/GREAT VESSELS: Coronary artery calcification. No thoracic aortic aneurysm. MEDIASTINUM AND VAUGHN: Unremarkable. CHEST WALL AND LOWER NECK: Unremarkable. VISUALIZED STRUCTURES IN THE UPPER ABDOMEN: Hypodense lesions of the liver are too small to characterize and may represent cysts. Hypodense lesions of bilateral kidneys likely represent cysts. OSSEOUS STRUCTURES: No acute fracture or destructive osseous lesion.     Impression: No acute intrathoracic findings. Workstation performed: [id number]

## 2024-10-03 ENCOUNTER — OFFICE VISIT (OUTPATIENT)
Age: 44
End: 2024-10-03
Payer: COMMERCIAL

## 2024-10-03 VITALS
BODY MASS INDEX: 28.25 KG/M2 | DIASTOLIC BLOOD PRESSURE: 78 MMHG | SYSTOLIC BLOOD PRESSURE: 120 MMHG | OXYGEN SATURATION: 98 % | WEIGHT: 180 LBS | HEART RATE: 88 BPM | HEIGHT: 67 IN | TEMPERATURE: 97.6 F

## 2024-10-03 DIAGNOSIS — Z82.71 FAMILY HISTORY OF POLYCYSTIC KIDNEY DISEASE: ICD-10-CM

## 2024-10-03 DIAGNOSIS — J20.9 ACUTE BRONCHITIS, UNSPECIFIED ORGANISM: Primary | ICD-10-CM

## 2024-10-03 DIAGNOSIS — Z72.0 TOBACCO USE: ICD-10-CM

## 2024-10-03 PROCEDURE — 87636 SARSCOV2 & INF A&B AMP PRB: CPT | Performed by: INTERNAL MEDICINE

## 2024-10-03 PROCEDURE — 99213 OFFICE O/P EST LOW 20 MIN: CPT | Performed by: INTERNAL MEDICINE

## 2024-10-03 RX ORDER — BUDESONIDE AND FORMOTEROL FUMARATE DIHYDRATE 160; 4.5 UG/1; UG/1
2 AEROSOL RESPIRATORY (INHALATION) 2 TIMES DAILY
Qty: 10.2 G | Refills: 5 | Status: SHIPPED | OUTPATIENT
Start: 2024-10-03 | End: 2024-10-04 | Stop reason: SDUPTHER

## 2024-10-03 RX ORDER — AZITHROMYCIN 250 MG/1
TABLET, FILM COATED ORAL
Qty: 6 TABLET | Refills: 0 | Status: SHIPPED | OUTPATIENT
Start: 2024-10-03 | End: 2024-10-07

## 2024-10-03 RX ORDER — BENZONATATE 200 MG/1
200 CAPSULE ORAL 3 TIMES DAILY PRN
Qty: 20 CAPSULE | Refills: 0 | Status: SHIPPED | OUTPATIENT
Start: 2024-10-03

## 2024-10-03 NOTE — PROGRESS NOTES
Assessment/Plan:           Problem List Items Addressed This Visit    None  Visit Diagnoses       Acute bronchitis, unspecified organism    -  Primary    Relevant Medications    budesonide-formoterol (SYMBICORT) 160-4.5 mcg/act inhaler    azithromycin (ZITHROMAX) 250 mg tablet    benzonatate (TESSALON) 200 MG capsule    Tobacco use        Relevant Medications    budesonide-formoterol (SYMBICORT) 160-4.5 mcg/act inhaler    Family history of polycystic kidney disease        Relevant Orders     kidney and bladder    Ambulatory Referral to Genetics              Subjective:      Patient ID: David Bustamante is a 44 y.o. male.    Cough  Associated symptoms include shortness of breath.   Fatigue  Associated symptoms include coughing and fatigue.   Shortness of Breath  Associated symptoms include coughing and fatigue.     Patient is here for an acute visit complaining of upper respite infections been going on for 3 days.  Started with sore throat and bodyaches and pains temperature and now cough started productive feeling tightness.  He ran out of his Symbicort inhaler.  He continues to smoke.  2 of his kids are also sick.  Will be checked for COVID and flu.  I will start him on antibiotic.  He does have albuterol inhaler he will start with Symbicort inhaler.  He will update me about his progress tomorrow and if his symptoms would not get better over the weekend he will go to the hospital.    He also brings to my attention a 16-year-old daughter  was diagnosed with polycystic kidney disease.  I will order a kidney bladder ultrasound as well as refer him to .  He updates me that he is waiting for home sleep study ordered by [facility].    The following portions of the patient's history were reviewed and updated as appropriate: allergies, current medications, past family history, past medical history, past social history, and problem list.    Review of Systems   Constitutional:  Positive for fatigue.  "  Respiratory:  Positive for cough and shortness of breath.          Objective:      /78 (BP Location: Left arm, Patient Position: Sitting, Cuff Size: Adult)   Pulse 88   Temp 97.6 °F (36.4 °C) (Tympanic)   Ht 5' 7\" (1.702 m)   Wt 81.6 kg (180 lb)   SpO2 98%   BMI 28.19 kg/m²          Physical Exam  Constitutional:       Comments: Sounds congested   HENT:      Mouth/Throat:      Pharynx: Posterior oropharyngeal erythema present. No oropharyngeal exudate.   Cardiovascular:      Rate and Rhythm: Normal rate and regular rhythm.   Pulmonary:      Effort: No respiratory distress.      Breath sounds: No wheezing or rales.      Comments: Decreased breath sounds no wheeze or rhonchi no egophony  Neurological:      Mental Status: He is alert.                 "

## 2024-10-04 ENCOUNTER — TELEPHONE (OUTPATIENT)
Age: 44
End: 2024-10-04

## 2024-10-04 DIAGNOSIS — Z72.0 TOBACCO USE: ICD-10-CM

## 2024-10-04 DIAGNOSIS — J20.5 ACUTE BRONCHITIS DUE TO RESPIRATORY SYNCYTIAL VIRUS (RSV): ICD-10-CM

## 2024-10-04 LAB
FLUAV RNA RESP QL NAA+PROBE: NEGATIVE
FLUBV RNA RESP QL NAA+PROBE: NEGATIVE
SARS-COV-2 RNA RESP QL NAA+PROBE: NEGATIVE

## 2024-10-04 RX ORDER — BUDESONIDE AND FORMOTEROL FUMARATE DIHYDRATE 160; 4.5 UG/1; UG/1
2 AEROSOL RESPIRATORY (INHALATION) 2 TIMES DAILY
Qty: 10.2 G | Refills: 5 | Status: SHIPPED | OUTPATIENT
Start: 2024-10-04

## 2024-10-04 RX ORDER — PREDNISONE 20 MG/1
20 TABLET ORAL DAILY
Qty: 5 TABLET | Refills: 0 | Status: SHIPPED | OUTPATIENT
Start: 2024-10-04

## 2024-10-04 NOTE — TELEPHONE ENCOUNTER
Reason for call:   [x] Refill   [] Prior Auth  [] Other:     Office:   [x] PCP/Provider - KEITH Hill  [] Specialty/Provider -     Medication:   Symbicort 160-4.5.      Pharmacy:   [address]      Does the patient have enough for 3 days?   [] Yes   [x] No - Send as HP to POD

## 2024-10-04 NOTE — TELEPHONE ENCOUNTER
----- Message from Jamila Hill MD sent at 10/4/2024  1:02 PM EDT -----  COVID and flu is negative.  Calling in prednisone

## 2024-10-15 DIAGNOSIS — R10.13 DYSPEPSIA: ICD-10-CM

## 2024-10-16 RX ORDER — PANTOPRAZOLE SODIUM 40 MG/1
40 TABLET, DELAYED RELEASE ORAL
Qty: 30 TABLET | Refills: 2 | Status: SHIPPED | OUTPATIENT
Start: 2024-10-16

## 2024-10-30 ENCOUNTER — TRANSCRIBE ORDERS (OUTPATIENT)
Dept: SLEEP CENTER | Facility: CLINIC | Age: 44
End: 2024-10-30

## 2024-10-30 DIAGNOSIS — R06.83 SNORING: Primary | ICD-10-CM

## 2024-12-10 DIAGNOSIS — F33.1 MODERATE EPISODE OF RECURRENT MAJOR DEPRESSIVE DISORDER (HCC): ICD-10-CM

## 2024-12-10 RX ORDER — TRAZODONE HYDROCHLORIDE 100 MG/1
TABLET ORAL
Qty: 45 TABLET | Refills: 0 | Status: SHIPPED | OUTPATIENT
Start: 2024-12-10

## 2024-12-10 NOTE — TELEPHONE ENCOUNTER
Reason for call:   [x] Refill   [] Prior Auth  [] Other:     Office:   [x] PCP/Provider -   [] Specialty/Provider -     Medication: Trazadone     Dose/Frequency: 100 mg tablet. Take 1-1/2 pills at night.    Quantity: 45    Pharmacy:   [facility] #[id number] [address] [phone]     Does the patient have enough for 3 days?   [] Yes   [x] No - Send as HP to POD

## 2024-12-19 ENCOUNTER — OFFICE VISIT (OUTPATIENT)
Age: 44
End: 2024-12-19
Payer: COMMERCIAL

## 2024-12-19 ENCOUNTER — NURSE TRIAGE (OUTPATIENT)
Age: 44
End: 2024-12-19

## 2024-12-19 VITALS
HEIGHT: 67 IN | DIASTOLIC BLOOD PRESSURE: 62 MMHG | RESPIRATION RATE: 16 BRPM | TEMPERATURE: 99.2 F | WEIGHT: 184 LBS | HEART RATE: 81 BPM | OXYGEN SATURATION: 98 % | BODY MASS INDEX: 28.88 KG/M2 | SYSTOLIC BLOOD PRESSURE: 104 MMHG

## 2024-12-19 DIAGNOSIS — J20.9 ACUTE BRONCHITIS, UNSPECIFIED ORGANISM: Primary | ICD-10-CM

## 2024-12-19 PROCEDURE — 99214 OFFICE O/P EST MOD 30 MIN: CPT | Performed by: PHYSICIAN ASSISTANT

## 2024-12-19 PROCEDURE — 99406 BEHAV CHNG SMOKING 3-10 MIN: CPT | Performed by: PHYSICIAN ASSISTANT

## 2024-12-19 RX ORDER — PREDNISONE 10 MG/1
TABLET ORAL
Qty: 30 TABLET | Refills: 0 | Status: SHIPPED | OUTPATIENT
Start: 2024-12-19

## 2024-12-19 NOTE — PROGRESS NOTES
Name: David Bustamante      : 1980      MRN: [id number]  Encounter Provider: Rosana Del Cid PA-C  Encounter Date: 2024   Encounter department: [facility]  :  Assessment & Plan  Acute bronchitis, unspecified organism  -Hold Mucinex D  - Start generic Sudafed 30 mg 2 tablets every 6 hours as needed  - Start Mucinex dm generic as package directs  - Prednisone taper as directed with food, no alcohol.  He states he does not drink alcohol  - Increase clear liquids  - Afrin nasal decongestant for 3 to 5 days  -Encourage quitting smoking.  I did give him the website PA quit now  - I did recommend follow-up if there is no improvement over the next 2 to 3 days, go to the ER if symptoms increase.  I did encourage him to follow-up with Dr. Hill for a pulmonary function test when he is feeling better along with a possible pulmonary referral.  Orders:    predniSONE 10 mg tablet; Take 5 tablets for 2 days then decrease by 1 tablet every 2 days until you complete the course with 1 tablet for 2 days    Tobacco Cessation Counseling: Tobacco cessation counseling and education was provided. The patient is sincerely urged to quit consumption of tobacco. He is not ready to quit tobacco. The numerous health risks of tobacco consumption were discussed. If he decides to quit, there are a number of helpful adjunctive aids, and he can see me to discuss nicotine replacement therapy, chantix, or bupropion anytime in the future.. I spent 5 minutes on Tobacco Cessation counseling during today's visit.    M*Z[employer] software was used to dictate this note. It may contain errors with dictating incorrect words/spelling. Please contact provider directly for any questions.          History of Present Illness     Patient presents today for acute visit for an evaluation of upper respiratory symptoms that started about 4 days ago.  He states his 3-year-old daughter was diagnosed with pneumonia approximately 3 days ago.  He  "denies any fever but he states he usually does not get fevers.  He denies any shortness of breath.  He does have nasal congestion and a cough.  He does take Mucinex D.  He does smoke 1 pack of cigarettes daily.  He states as far as he knows he has never had asthma in the past.  He does utilize Symbicort 160 mg twice daily as directed but he states he does not feel as though this medication is benefiting him even when he is not ill.  He has never seen pulmonary.  He denies any shortness of breath.      Review of Systems   Constitutional:  Negative for chills and fever.   HENT:  Positive for congestion, postnasal drip and rhinorrhea.    Respiratory:  Positive for cough. Negative for shortness of breath and wheezing.        Objective   /62 (BP Location: Left arm, Patient Position: Sitting, Cuff Size: Large)   Pulse 81   Temp 99.2 °F (37.3 °C) (Temporal)   Resp 16   Ht 5' 7\" (1.702 m)   Wt 83.5 kg (184 lb)   SpO2 98%   BMI 28.82 kg/m²      Physical Exam  Vitals reviewed.   Constitutional:       General: He is not in acute distress.     Appearance: Normal appearance. He is not ill-appearing, toxic-appearing or diaphoretic.   HENT:      Head: Normocephalic and atraumatic.   Cardiovascular:      Rate and Rhythm: Normal rate and regular rhythm.      Heart sounds: Normal heart sounds. No murmur heard.  Pulmonary:      Effort: Pulmonary effort is normal. No respiratory distress.      Breath sounds: Rhonchi (He does have diffuse rhonchi) present. No wheezing or rales.   Musculoskeletal:      Cervical back: Neck supple.   Neurological:      General: No focal deficit present.      Mental Status: He is alert.   Psychiatric:         Mood and Affect: Mood normal.         Behavior: Behavior normal.         Thought Content: Thought content normal.         "

## 2024-12-19 NOTE — TELEPHONE ENCOUNTER
"Patient reports his daughter was recently seen and treated for pneumonia and he developed symptoms as well. He was requesting antibiotics and advised he should be seen in the office so he can be assessed. Patient scheduled for 1600 today and agreeable to wear a mask. (Patient would like a call back if appointment is not necessary.)     Reason for Disposition   SEVERE coughing spells (e.g., whooping sound after coughing, vomiting after coughing)    Answer Assessment - Initial Assessment Questions  1. ONSET: \"When did the cough begin?\"       Ongoing, worse the last couple days   2. SEVERITY: \"How bad is the cough today?\"       Moderate-severe  3. SPUTUM: \"Describe the color of your sputum\" (e.g., none, dry cough; clear, white, yellow, green)      N/A  4. HEMOPTYSIS: \"Are you coughing up any blood?\" If Yes, ask: \"How much?\" (e.g., flecks, streaks, tablespoons, etc.)      N/A  5. DIFFICULTY BREATHING: \"Are you having difficulty breathing?\" If Yes, ask: \"How bad is it?\" (e.g., mild, moderate, severe)       Mild SOB with exertion   6. FEVER: \"Do you have a fever?\" If Yes, ask: \"What is your temperature, how was it measured, and when did it start?\"      Denies   7. CARDIAC HISTORY: \"Do you have any history of heart disease?\" (e.g., heart attack, congestive heart failure)       N/A  8. LUNG HISTORY: \"Do you have any history of lung disease?\"  (e.g., pulmonary embolus, asthma, emphysema)      N/A  9. PE RISK FACTORS: \"Do you have a history of blood clots?\" (or: recent major surgery, recent prolonged travel, bedridden)     N/A    Protocols used: Cough-Adult-[place]    "

## 2024-12-23 ENCOUNTER — TELEPHONE (OUTPATIENT)
Age: 44
End: 2024-12-23

## 2024-12-23 DIAGNOSIS — J06.9 ACUTE UPPER RESPIRATORY INFECTION: Primary | ICD-10-CM

## 2024-12-23 NOTE — TELEPHONE ENCOUNTER
Patient contacted the office this afternoon in regards to visit from 12/19. States he was prescribed Prednisone, now has a sore throat which he did not have before and the cough is still persistent and maybe even a little worse. He is asking if there is anything else that can be prescribed at this time, sent to [facility] pharmacy. Please advise.

## 2024-12-24 RX ORDER — BENZONATATE 200 MG/1
200 CAPSULE ORAL 3 TIMES DAILY PRN
Qty: 20 CAPSULE | Refills: 0 | Status: SHIPPED | OUTPATIENT
Start: 2024-12-24

## 2024-12-24 RX ORDER — AMOXICILLIN 500 MG/1
500 TABLET, FILM COATED ORAL 3 TIMES DAILY
Qty: 21 TABLET | Refills: 0 | Status: SHIPPED | OUTPATIENT
Start: 2024-12-24 | End: 2024-12-31

## 2024-12-24 NOTE — TELEPHONE ENCOUNTER
Patient called again regarding request from yesterday.  He is asking if he needs an antibiotic and if a cough medication can be sent to his pharmacy because he is having trouble with coughing, especially at night when he is trying to sleep.  Please follow up with patient to advise.

## 2024-12-31 DIAGNOSIS — R05.3 CHRONIC COUGH: ICD-10-CM

## 2024-12-31 RX ORDER — ALBUTEROL SULFATE 90 UG/1
INHALANT RESPIRATORY (INHALATION)
Qty: 18 G | Refills: 1 | Status: SHIPPED | OUTPATIENT
Start: 2024-12-31

## 2025-01-05 DIAGNOSIS — F33.1 MODERATE EPISODE OF RECURRENT MAJOR DEPRESSIVE DISORDER (HCC): ICD-10-CM

## 2025-01-06 RX ORDER — TRAZODONE HYDROCHLORIDE 100 MG/1
TABLET ORAL
Qty: 45 TABLET | Refills: 0 | Status: SHIPPED | OUTPATIENT
Start: 2025-01-06

## 2025-01-08 DIAGNOSIS — B37.0 THRUSH: Primary | ICD-10-CM

## 2025-01-08 RX ORDER — NYSTATIN 100000 [USP'U]/ML
500000 SUSPENSION ORAL 4 TIMES DAILY
Qty: 280 ML | Refills: 0 | Status: SHIPPED | OUTPATIENT
Start: 2025-01-08 | End: 2025-01-22

## 2025-01-14 ENCOUNTER — OFFICE VISIT (OUTPATIENT)
Age: 45
End: 2025-01-14
Payer: MEDICARE

## 2025-01-14 VITALS
BODY MASS INDEX: 28.35 KG/M2 | HEART RATE: 94 BPM | DIASTOLIC BLOOD PRESSURE: 98 MMHG | TEMPERATURE: 99.2 F | OXYGEN SATURATION: 96 % | SYSTOLIC BLOOD PRESSURE: 120 MMHG | WEIGHT: 181 LBS

## 2025-01-14 DIAGNOSIS — J06.9 ACUTE URI: Primary | ICD-10-CM

## 2025-01-14 PROCEDURE — 99213 OFFICE O/P EST LOW 20 MIN: CPT | Performed by: FAMILY MEDICINE

## 2025-01-14 PROCEDURE — 87070 CULTURE OTHR SPECIMN AEROBIC: CPT | Performed by: FAMILY MEDICINE

## 2025-01-14 RX ORDER — CEFUROXIME AXETIL 500 MG/1
500 TABLET ORAL EVERY 12 HOURS SCHEDULED
Qty: 14 TABLET | Refills: 0 | Status: SHIPPED | OUTPATIENT
Start: 2025-01-14 | End: 2025-01-21

## 2025-01-14 RX ORDER — AZITHROMYCIN 250 MG/1
TABLET, FILM COATED ORAL
Qty: 6 TABLET | Refills: 0 | Status: SHIPPED | OUTPATIENT
Start: 2025-01-14 | End: 2025-01-18

## 2025-01-14 NOTE — PROGRESS NOTES
Assessment/Plan:       Diagnoses and all orders for this visit:    Acute URI  Comments:  Patient is Z-Andi as well as Ceftin as directed.  Orders:  -     azithromycin (ZITHROMAX) 250 mg tablet; Take 2 tablets today then 1 tablet daily x 4 days  -     cefuroxime (CEFTIN) 500 mg tablet; Take 1 tablet (500 mg total) by mouth every 12 (twelve) hours for 7 days            Subjective:        Patient ID: David Bustamante is a 44 y.o. male.      Patient is here with sore throat runny nose body aches fatigue over the past few days.  Daughter with strep throat.  No vomiting or diarrhea.  Patient has been running fevers.  Patient using Gauthier's cough drops.  Patient with cough with yellow sputum production.    Sore Throat   Associated symptoms include congestion and coughing.   Cough  Associated symptoms include a fever, postnasal drip, rhinorrhea and a sore throat.         The following portions of the patient's history were reviewed and updated as appropriate: allergies, current medications, past family history, past medical history, past social history, past surgical history and problem list.      Review of Systems   Constitutional:  Positive for fatigue and fever.   HENT:  Positive for congestion, postnasal drip, rhinorrhea, sinus pain and sore throat.    Eyes: Negative.    Respiratory:  Positive for cough.    Cardiovascular: Negative.    Gastrointestinal: Negative.    Endocrine: Negative.    Genitourinary: Negative.    Musculoskeletal: Negative.    Skin: Negative.    Allergic/Immunologic: Negative.    Neurological: Negative.    Hematological: Negative.    Psychiatric/Behavioral: Negative.             Objective:               /98 (BP Location: Right arm, Patient Position: Sitting, Cuff Size: Large)   Pulse 94   Temp 99.2 °F (37.3 °C)   Wt 82.1 kg (181 lb)   SpO2 96%   BMI 28.35 kg/m²          Physical Exam  Vitals and nursing note reviewed.   Constitutional:       General: He is not in acute distress.     Appearance:  He is not ill-appearing, toxic-appearing or diaphoretic.   HENT:      Head: Normocephalic and atraumatic.      Right Ear: Tympanic membrane, ear canal and external ear normal. There is no impacted cerumen.      Left Ear: Tympanic membrane, ear canal and external ear normal. There is no impacted cerumen.      Nose: Nose normal. No congestion or rhinorrhea.      Mouth/Throat:      Mouth: Mucous membranes are moist.      Pharynx: Oropharyngeal exudate present.   Eyes:      General: No scleral icterus.        Right eye: No discharge.         Left eye: No discharge.   Neck:      Vascular: No carotid bruit.   Cardiovascular:      Rate and Rhythm: Normal rate and regular rhythm.      Pulses: Normal pulses.      Heart sounds: Normal heart sounds. No murmur heard.     No friction rub. No gallop.   Pulmonary:      Effort: Pulmonary effort is normal. No respiratory distress.      Breath sounds: No stridor. Wheezing present. No rhonchi or rales.   Chest:      Chest wall: No tenderness.   Musculoskeletal:         General: No swelling, tenderness, deformity or signs of injury. Normal range of motion.      Cervical back: Normal range of motion and neck supple. No rigidity. No muscular tenderness.      Right lower leg: No edema.      Left lower leg: No edema.   Lymphadenopathy:      Cervical: No cervical adenopathy.   Skin:     General: Skin is warm and dry.      Capillary Refill: Capillary refill takes less than 2 seconds.      Coloration: Skin is not jaundiced.      Findings: No bruising, erythema, lesion or rash.   Neurological:      Mental Status: He is alert and oriented to person, place, and time.      Cranial Nerves: No cranial nerve deficit.      Sensory: No sensory deficit.      Motor: No weakness.      Coordination: Coordination normal.      Gait: Gait normal.   Psychiatric:         Mood and Affect: Mood normal.         Behavior: Behavior normal.         Thought Content: Thought content normal.         Judgment: Judgment  normal.

## 2025-01-16 LAB — BACTERIA THROAT CULT: NORMAL

## 2025-01-30 DIAGNOSIS — R10.13 DYSPEPSIA: ICD-10-CM

## 2025-01-30 RX ORDER — PANTOPRAZOLE SODIUM 40 MG/1
40 TABLET, DELAYED RELEASE ORAL
Qty: 90 TABLET | Refills: 1 | Status: SHIPPED | OUTPATIENT
Start: 2025-01-30

## 2025-02-02 DIAGNOSIS — F33.1 MODERATE EPISODE OF RECURRENT MAJOR DEPRESSIVE DISORDER (HCC): ICD-10-CM

## 2025-02-03 RX ORDER — TRAZODONE HYDROCHLORIDE 100 MG/1
TABLET ORAL
Qty: 45 TABLET | Refills: 1 | Status: SHIPPED | OUTPATIENT
Start: 2025-02-03

## 2025-04-04 DIAGNOSIS — Z72.0 TOBACCO USE: ICD-10-CM

## 2025-04-04 DIAGNOSIS — F33.1 MODERATE EPISODE OF RECURRENT MAJOR DEPRESSIVE DISORDER (HCC): ICD-10-CM

## 2025-04-04 RX ORDER — BUDESONIDE AND FORMOTEROL FUMARATE DIHYDRATE 160; 4.5 UG/1; UG/1
AEROSOL RESPIRATORY (INHALATION)
Qty: 10.2 G | Refills: 5 | Status: SHIPPED | OUTPATIENT
Start: 2025-04-04

## 2025-04-04 RX ORDER — TRAZODONE HYDROCHLORIDE 100 MG/1
TABLET ORAL
Qty: 45 TABLET | Refills: 1 | Status: SHIPPED | OUTPATIENT
Start: 2025-04-04

## 2025-04-09 ENCOUNTER — TELEPHONE (OUTPATIENT)
Age: 45
End: 2025-04-09

## 2025-04-09 DIAGNOSIS — R30.0 DYSURIA: Primary | ICD-10-CM

## 2025-04-09 NOTE — TELEPHONE ENCOUNTER
Patient calls to report that he has pain with urination. Patient took a bath with Epsom salts and minerals. Patient states that the discomfort began after that. Patient states that he was in the ED (for back pain in the thoracic area) and the urinalysis was normal. Patient denies any other UTI symptoms. No change in color or clarity, no hematuria, no fever, no chills, no frequency. Patient denies any changes to sexual partners or relationships. Patient is asking for advice. Patient declined a visit. Patient can be reached at [phone].

## 2025-04-28 ENCOUNTER — TELEPHONE (OUTPATIENT)
Age: 45
End: 2025-04-28

## 2025-04-28 NOTE — TELEPHONE ENCOUNTER
Patient calling fluoxetine 20 mg daily to be called to [facility] pharmacy in [facility].  States that Dr. Hill said at his last visit that she would order it for him because he is not seeing psychiatrist anymore. He took his last pill 3 days ago.

## 2025-04-28 NOTE — TELEPHONE ENCOUNTER
2nd call from patient asking if Dr. Hill could prescribe his prozac as soon as possible b/c his last dose was Friday and he does not want to get symptoms from withdrawal.

## 2025-04-29 NOTE — TELEPHONE ENCOUNTER
Received call from patient on update of Prozac prescription   This was managed by psych - he is no longer seeing due to insurance reasons   He states he spoke with provider regarding this in the past and was told to just call and request a refill.   Please send to [facility] on file.     Called PCP office.

## 2025-04-30 DIAGNOSIS — F33.1 MODERATE EPISODE OF RECURRENT MAJOR DEPRESSIVE DISORDER (HCC): Primary | ICD-10-CM

## 2025-04-30 RX ORDER — FLUOXETINE HYDROCHLORIDE 40 MG/1
40 CAPSULE ORAL DAILY
Qty: 30 CAPSULE | Refills: 3 | Status: SHIPPED | OUTPATIENT
Start: 2025-04-30 | End: 2025-05-01

## 2025-05-01 DIAGNOSIS — F33.1 MODERATE EPISODE OF RECURRENT MAJOR DEPRESSIVE DISORDER (HCC): ICD-10-CM

## 2025-05-01 NOTE — TELEPHONE ENCOUNTER
Patient called to advise that the prescription for FLUoxetine (PROzac) should only be 20 mg. Patient is requested a new prescription be called in asap as he is without medication.

## 2025-05-01 NOTE — TELEPHONE ENCOUNTER
Left voice message advising patient correct dose Prozac 20mg was sent to [facility].

## 2025-05-01 NOTE — TELEPHONE ENCOUNTER
Prescription for 20 mg of Prozac sent to pharmacy instead.  Patient should follow-up with Dr. Hill, thank you.

## 2025-06-01 DIAGNOSIS — F33.1 MODERATE EPISODE OF RECURRENT MAJOR DEPRESSIVE DISORDER (HCC): ICD-10-CM

## 2025-06-02 RX ORDER — TRAZODONE HYDROCHLORIDE 100 MG/1
TABLET ORAL
Qty: 45 TABLET | Refills: 1 | Status: SHIPPED | OUTPATIENT
Start: 2025-06-02

## 2025-07-26 DIAGNOSIS — R10.13 DYSPEPSIA: ICD-10-CM

## 2025-07-26 DIAGNOSIS — F33.1 MODERATE EPISODE OF RECURRENT MAJOR DEPRESSIVE DISORDER (HCC): ICD-10-CM

## 2025-07-28 RX ORDER — TRAZODONE HYDROCHLORIDE 100 MG/1
TABLET ORAL
Qty: 45 TABLET | Refills: 1 | Status: SHIPPED | OUTPATIENT
Start: 2025-07-28

## 2025-07-28 RX ORDER — PANTOPRAZOLE SODIUM 40 MG/1
40 TABLET, DELAYED RELEASE ORAL
Qty: 30 TABLET | Refills: 2 | Status: SHIPPED | OUTPATIENT
Start: 2025-07-28

## 2025-07-31 ENCOUNTER — OFFICE VISIT (OUTPATIENT)
Age: 45
End: 2025-07-31
Payer: COMMERCIAL

## 2025-07-31 VITALS
TEMPERATURE: 97.8 F | WEIGHT: 171 LBS | RESPIRATION RATE: 16 BRPM | DIASTOLIC BLOOD PRESSURE: 68 MMHG | SYSTOLIC BLOOD PRESSURE: 112 MMHG | BODY MASS INDEX: 26.84 KG/M2 | OXYGEN SATURATION: 97 % | HEIGHT: 67 IN | HEART RATE: 81 BPM

## 2025-07-31 DIAGNOSIS — H10.32 ACUTE BACTERIAL CONJUNCTIVITIS OF LEFT EYE: Primary | ICD-10-CM

## 2025-07-31 DIAGNOSIS — F33.1 MODERATE EPISODE OF RECURRENT MAJOR DEPRESSIVE DISORDER (HCC): ICD-10-CM

## 2025-07-31 PROCEDURE — 99213 OFFICE O/P EST LOW 20 MIN: CPT | Performed by: PHYSICIAN ASSISTANT

## 2025-07-31 RX ORDER — OFLOXACIN 3 MG/ML
2 SOLUTION/ DROPS OPHTHALMIC 4 TIMES DAILY
Qty: 5 ML | Refills: 0 | Status: SHIPPED | OUTPATIENT
Start: 2025-07-31